# Patient Record
Sex: FEMALE | Race: WHITE | Employment: OTHER | URBAN - METROPOLITAN AREA
[De-identification: names, ages, dates, MRNs, and addresses within clinical notes are randomized per-mention and may not be internally consistent; named-entity substitution may affect disease eponyms.]

---

## 2017-06-21 ENCOUNTER — OFFICE VISIT (OUTPATIENT)
Dept: SURGERY | Age: 52
End: 2017-06-21

## 2017-06-21 VITALS
SYSTOLIC BLOOD PRESSURE: 118 MMHG | TEMPERATURE: 98 F | DIASTOLIC BLOOD PRESSURE: 90 MMHG | WEIGHT: 254 LBS | OXYGEN SATURATION: 95 % | HEIGHT: 65 IN | HEART RATE: 85 BPM | BODY MASS INDEX: 42.32 KG/M2 | RESPIRATION RATE: 18 BRPM

## 2017-06-21 DIAGNOSIS — E66.01 MORBID OBESITY WITH BMI OF 40.0-44.9, ADULT (HCC): Primary | ICD-10-CM

## 2017-06-21 DIAGNOSIS — I10 ESSENTIAL HYPERTENSION, MALIGNANT: ICD-10-CM

## 2017-06-21 DIAGNOSIS — K21.9 GASTROESOPHAGEAL REFLUX DISEASE, ESOPHAGITIS PRESENCE NOT SPECIFIED: ICD-10-CM

## 2017-06-21 RX ORDER — MICONAZOLE NITRATE 2 %
CREAM WITH APPLICATOR VAGINAL 2 TIMES DAILY
COMMUNITY
End: 2018-08-23

## 2017-06-21 NOTE — PROGRESS NOTES
1. Have you been to the ER, urgent care clinic since your last visit? Hospitalized since your last visit? Julia Lee for dog bite to face    2. Have you seen or consulted any other health care providers outside of the [facility] since your last visit? Include any pap smears or colon screening.  Julia Lee for dog bite to face

## 2017-06-21 NOTE — MR AVS SNAPSHOT
Visit Information Date & Time Provider Department Dept. Phone Encounter #  
 6/21/2017  1:00 PM Devon Talavera, Jose Miguel Galeas [phone] [id number] Upcoming Health Maintenance Date Due DTaP/Tdap/Td series (1 - Tdap) 3/7/1987 PAP AKA CERVICAL CYTOLOGY 3/7/1987 BREAST CANCER SCRN MAMMOGRAM 3/7/2016 FOBT Q 1 YEAR AGE 50-75 3/7/2016 INFLUENZA AGE 9 TO ADULT 8/1/2017 Allergies as of 6/21/2017  Review Complete On: 6/21/2017 By: Clista Matter Barley Severity Noted Reaction Type Reactions Levaquin [Levofloxacin] High 06/19/2015    Anaphylaxis Morphine  06/19/2015    Other (comments) Current Immunizations  Reviewed on 2/27/2016 No immunizations on file. Not reviewed this visit Vitals BP Pulse Temp Resp Height(growth percentile) Weight(growth percentile)  
 118/90 (BP 1 Location: Left arm, BP Patient Position: Sitting) 85 98 °F (36.7 °C) (Oral) 18 5' 5\" (1.651 m) 254 lb (115.2 kg) SpO2 BMI OB Status Smoking Status 95% 42.27 kg/m2 Hysterectomy Former Smoker Vitals History BMI and BSA Data Body Mass Index Body Surface Area  
 42.27 kg/m 2 2.3 m 2 Preferred Pharmacy Pharmacy Name Phone [facility] [address] [place], [place] [address]y [address]  [facility] [phone] Your Updated Medication List  
  
   
This list is accurate as of: 6/21/17  1:39 PM.  Always use your most recent med list.  
  
  
  
  
 acetaminophen 325 mg tablet Commonly known as:  TYLENOL Take 2 Tabs by mouth every six (6) hours. Indications: PAIN  
  
 aspirin delayed-release 325 mg tablet Take 1 Tab by mouth two (2) times a day. Start after Lovenox therapy is complete. calcium carbonate 200 mg calcium (500 mg) Chew Commonly known as:  TUMS Take 3 Tabs by mouth daily. CENTRUM SILVER PO Take  by mouth daily. cetirizine 10 mg tablet Commonly known as:  ZYRTEC Take 10 mg by mouth. Citracal + D tablet Generic drug:  calcium citrate-vitamin D3 Take  by mouth two (2) times a day. cloNIDine HCl 0.1 mg tablet Commonly known as:  CATAPRES  
0.1 mg three (3) times daily. cyanocobalamin 1,000 mcg tablet Take 1,000 mcg by mouth daily. Dexlansoprazole 60 mg Cpdb Take 30 mg by mouth daily. DULoxetine 60 mg capsule Commonly known as:  CYMBALTA 60 mg two (2) times a day. enoxaparin 40 mg/0.4 mL Commonly known as:  LOVENOX  
0.4 mL by SubCUTAneous route daily. Indications: DEEP VEIN THROMBOSIS PREVENTION  
  
 fentaNYL 75 mcg/hr Commonly known as:  DURAGESIC  
1 Patch by TransDERmal route every seventy-two (72) hours. gabapentin 300 mg capsule Commonly known as:  NEURONTIN  
300 mg three (3) times daily. HYDROmorphone 4 mg tablet Commonly known as:  DILAUDID Take 1 Tab by mouth every four (4) hours as needed. Max Daily Amount: 24 mg. Indications: PAIN, Not to be filled. This prescription is for instructional purposes only. Patient has supply at home. mometasone-formoterol 200-5 mcg/actuation HFA inhaler Commonly known as:  Swati Console Take 1 Puff by inhalation two (2) times a day. montelukast 10 mg tablet Commonly known as:  SINGULAIR  
10 mg nightly. NASONEX 50 mcg/actuation nasal spray Generic drug:  mometasone 2 Sprays by Both Nostrils route daily. * albuterol 2.5 mg /3 mL (0.083 %) nebulizer solution Commonly known as:  PROVENTIL VENTOLIN  
as needed. * PROAIR HFA 90 mcg/actuation inhaler Generic drug:  albuterol Take 2 Puffs by inhalation two (2) times daily as needed. senna-docusate 8.6-50 mg per tablet Commonly known as:  Selam Passy Take 1 Tab by mouth two (2) times a day. Indications: CONSTIPATION  
  
 tamoxifen 20 mg tablet Commonly known as:  NOLVADEX  
20 mg nightly. tiotropium 18 mcg inhalation capsule Commonly known as:  Tacey Conrado Take 1 Cap by inhalation daily. traZODone 50 mg tablet Commonly known as:  DESYREL  
nightly. VITAMIN D2 50,000 unit capsule Generic drug:  ergocalciferol 50,000 Units every Tuesday and Thursday. * Notice: This list has 2 medication(s) that are the same as other medications prescribed for you. Read the directions carefully, and ask your doctor or other care provider to review them with you. Introducing [facility]! Flaquita Bailey introduces [employer] patient portal. Now you can access parts of your medical record, email your doctor's office, and request medication refills online. 1. In your internet browser, go to [email or web address] 2. Click on the First Time User? Click Here link in the Sign In box. You will see the New Member Sign Up page. 3. Enter your [employer] Access Code exactly as it appears below. You will not need to use this code after youve completed the sign-up process. If you do not sign up before the expiration date, you must request a new code. · [employer] Access Code: [email or web address] Expires: 9/19/2017  1:39 PM 
 
4. Enter the last four digits of your Social Security Number (xxxx) and Date of Birth (mm/dd/yyyy) as indicated and click Submit. You will be taken to the next sign-up page. 5. Create a [employer] ID. This will be your [employer] login ID and cannot be changed, so think of one that is secure and easy to remember. 6. Create a [employer] password. You can change your password at any time. 7. Enter your Password Reset Question and Answer. This can be used at a later time if you forget your password. 8. Enter your e-mail address. You will receive e-mail notification when new information is available in [address] 9. Click Sign Up. You can now view and download portions of your medical record. 10. Click the Download Summary menu link to download a portable copy of your medical information. If you have questions, please visit the Frequently Asked Questions section of the [employer] website. Remember, [employer] is NOT to be used for urgent needs. For medical emergencies, dial 911. Now available from your iPhone and Android! Please provide this summary of care documentation to your next provider. Your primary care clinician is listed as Maday Ryan. If you have any questions after today's visit, please call [phone].

## 2017-06-22 NOTE — PROGRESS NOTES
Initial Consultation for possible revision of previous Bariatric Surgery     Milton Nelson is a 47 y.o. female who comes into the office today for initial consultation for the surgical options for the treatment of morbid obesity. She has chronic obesity unresponsive to numerous treatment strategies. Milton Nelson has tried a variety of weight-loss attempts including a previous Lap Amrik en y gastric bypass in 2003 by Dr. Nestor Baumgarten, but has had weight regain over recent years, notably related to ICU admission with severe asthma attacks requiring steroid treatment. Her pre-surgery weight was 315 lbs and her lowest postoperative body weight was aproximately 165 lbs. She claims to have maintained her weight between 165-180 lbs until 2015 when she experienced above asthma issues and since then has regained aproximately 75 lbs over 30 months. She has been told that her previous surgery is a lap gastric bypass. She does receive feedback from the previous surgery, including satiety with eating 3 inch sub sandwich, dumping symptoms which still keep her from eating more than a 'taste' of sugar/sweets. She does experience hunger but no specific food cravings- she believes her worse dietary indiscretion is drinking diet dr haq. She feels she becomes hungry again within several hours of a meal and often eats again which she agrees is grazing behavior. She was given a trial of phentermine but  Noticed no effect on her hunger. She has chronic pain syndrome mostly due to her back. She claims to need joint replacement for her right hip and both knees. She has known Vit D deficiency on supplementation, osteopenia and bone loss. She denies severe GERD and always has some hoarseness to her voice although she does not think this is GERD related. She reports being compliant with multivits, B12 and Fe for 'many years' and denies any of those being deficient. She is very limited in her physical activity due to pain. Antonella Mathur has significant health problems due to severe obesity - per below. Today she is Height: 5' 5\" (165.1 cm) , Weight: 254 lb (115.2 kg) for a BMI of Body mass index is 42.27 kg/(m^2). Lorenso Frankel Weight Loss Metrics 6/21/2017 2/26/2016 2/11/2016 6/19/2015   Today's Wt 254 lb 255 lb 255 lb 6 oz 241 lb 12.8 oz   BMI 42.27 kg/m2 42.43 kg/m2 42.5 kg/m2 40.24 kg/m2       Body mass index is 42.27 kg/(m^2). Past Medical History:   Diagnosis Date    Arthritis     Asthma     Autoimmune disease [facility])     ADULT MD    Cancer ([facility] [address] 2012    LEFT BREAST    Chronic pain     Fall     Headache     Ill-defined condition 2015    PULMONARY HYPERTENSION    Nausea & vomiting     Sarcoidosis ([facility] [address]     Sleep apnea     USES BIPAP     Adult muscular dystrophy    Past Surgical History:   Procedure Laterality Date    CHEST SURGERY PROCEDURE UNLISTED  2000    BIOPSY OUTER LUNG    HX BREAST RECONSTRUCTION  2012    HX BREAST RECONSTRUCTION  2012     3 SURGERIES TO REMOVE DEAD TISSUE    HX BREAST RECONSTRUCTION  2013 2015    LATISAMUS FLAP, IMPLANTS FAT GRAFTING     HX CERVICAL FUSION  2012    C5-C6 hardware    HX CHOLECYSTECTOMY  1998    HX COLPOSCOPY  2001    HX GASTRIC BYPASS  2003    HX GYN  2001    CERVIX CONE PROCEDURE    HX GYN  2008    EUTERO ABLATION    HX HEENT      HX HYSTERECTOMY  2014    HX HYSTEROSCOPY WITH ENDOMETRIAL ABLATION  2008    HX LUMBAR FUSION  2011    L5-S1 hardware    HX MASTECTOMY Bilateral 2012    HX ORTHOPAEDIC  2010    BONE GRAFT SCAFOID BONE WRIST    HX OTHER SURGICAL  2013    PAIN STIMULATOR    HX OTHER SURGICAL  2015    MUSCLE BIOPSY     HX TONSILLECTOMY  1982       Current Outpatient Prescriptions   Medication Sig Dispense Refill    calcium citrate-vitamin D3 (CITRACAL + D) tablet Take  by mouth two (2) times a day.  HYDROmorphone (DILAUDID) 4 mg tablet Take 1 Tab by mouth every four (4) hours as needed. Max Daily Amount: 24 mg. Indications: PAIN, Not to be filled. This prescription is for instructional purposes only. Patient has supply at home. 120 Tab 0    tiotropium (SPIRIVA) 18 mcg inhalation capsule Take 1 Cap by inhalation daily.  mometasone-formoterol (DULERA) 200-5 mcg/actuation HFA inhaler Take 1 Puff by inhalation two (2) times a day.  cetirizine (ZYRTEC) 10 mg tablet Take 10 mg by mouth.  Dexlansoprazole 60 mg CpDB Take 30 mg by mouth daily.  calcium carbonate (TUMS) 200 mg calcium (500 mg) chew Take 3 Tabs by mouth daily.  fentaNYL (DURAGESIC) 75 mcg/hr 1 Patch by TransDERmal route every seventy-two (72) hours.  gabapentin (NEURONTIN) 300 mg capsule 300 mg three (3) times daily. 1    DULoxetine (CYMBALTA) 60 mg capsule 60 mg two (2) times a day.  tamoxifen (NOLVADEX) 20 mg tablet 20 mg nightly. 11    VITAMIN D2 50,000 unit capsule 50,000 Units every Tuesday and Thursday. 0    traZODone (DESYREL) 50 mg tablet nightly. Purje 57 90 mcg/actuation inhaler Take 2 Puffs by inhalation two (2) times daily as needed. 3    NASONEX 50 mcg/actuation nasal spray 2 Sprays by Both Nostrils route daily. 3    montelukast (SINGULAIR) 10 mg tablet 10 mg nightly. 3    cloNIDine HCl (CATAPRES) 0.1 mg tablet 0.1 mg three (3) times daily. 2    cyanocobalamin 1,000 mcg tablet Take 1,000 mcg by mouth daily.  FOLIC ACID/MULTIVIT-MIN/LUTEIN (CENTRUM SILVER PO) Take  by mouth daily.  acetaminophen (TYLENOL) 325 mg tablet Take 2 Tabs by mouth every six (6) hours. Indications: PAIN 100 Tab 0    enoxaparin (LOVENOX) 40 mg/0.4 mL 0.4 mL by SubCUTAneous route daily. Indications: DEEP VEIN THROMBOSIS PREVENTION 7 Syringe 0    aspirin delayed-release 325 mg tablet Take 1 Tab by mouth two (2) times a day. Start after Lovenox therapy is complete. 60 Tab 0    senna-docusate (PERICOLACE) 8.6-50 mg per tablet Take 1 Tab by mouth two (2) times a day.  Indications: CONSTIPATION 60 Tab 0    albuterol (PROVENTIL VENTOLIN) 2.5 mg /3 mL (0.083 %) nebulizer solution as needed. 3       Allergies   Allergen Reactions    Levaquin [Levofloxacin] Anaphylaxis    Morphine Other (comments)       Social History   Substance Use Topics    Smoking status: Former Smoker     Packs/day: 0.50     Years: 3.00     Quit date: 2/11/1988    Smokeless tobacco: Never Used    Alcohol use No       Family History   Problem Relation Age of Onset    Heart Disease Mother     Asthma Mother     Diabetes Mother     COPD Mother     Cancer Sister      MELANOMA    Heart Disease Brother     Asthma Brother     Cancer Sister      BREAST    Heart Attack Brother     Cancer Paternal Grandmother     Anesth Problems Neg Hx        ROS, positive where in bold:    General: fevers, chills, night sweats, fatigue, weight loss, weight gain. GI: abdominal pain, nausea, vomiting, no constipation or loose stools, no change in bowel habits, hematochezia, melena, or GERD. Integumentary: dermatitis or abnormal moles. HEENT:  visual changes, vertigo, epistaxis, dysphagia, hoarseness    Cardiac: chest pain, palpitations, hypertension, edema,  varicosities    Resp:  cough, shortness of breath, wheezing, hemoptysis, snoring,  reactive airway disease    : hematuria, dysuria, frequency, urgency, nocturia, stress urinary incontinence    MSK: weakness, joint pain,  Arthritis, severe back pain radiating to legs    Endocrine: diabetes, thyroid disease, polyuria, polydipsia, polyphagia, poor wound healing, heat intolerance,cold intolerance. Lymph/Heme: anemia, bruising,  history of blood transfusions. Neuro: dizziness, headache, fainting, seizures, stroke.     Psychiatry:  Anxiety, depression, psychosis      Physical Exam:  Visit Vitals    /90 (BP 1 Location: Left arm, BP Patient Position: Sitting)    Pulse 85    Temp 98 °F (36.7 °C) (Oral)    Resp 18    Ht 5' 5\" (1.651 m)    Wt 254 lb (115.2 kg)    SpO2 95%    BMI 42.27 kg/m2       General: Well developed, obese 52 y.o. female in no acute distress  ENMT: normocephalic, atraumatic   Respiratory: excursions normal and symmetrical  Cardiovascular: Regular rate and rhythm  Abdomen:  No obvious hernia  Musculoskeletal: normal gait/station  Neuro: symmetrical  Psych: alert and oriented to person, place and time      Impression:    Chayo Barrera is a 47 y.o. female who is suffering from morbid obesity with a BMI of Body mass index is 42.27 kg/(m^2). and comorbidities  who could benefit from weight loss significantly. She is struggling after previous lap gastric bypass. I believe she may be best managed by anatomic and behavioral evaluations. Work up will include an UGI series with barium tablet and meal in order to assess the previous gastric bypass anatomy. I will delay on performing EGD because I suspect based on her eating behavior that she may not have profound failure of restriction. I have also asked her to see Hebert Sorto RD for behavioral assessment / guidance. I believe this will be a difficult management problem due to the apparent role of corticosteroids in her weight regain suggesting this may have been from their impact on her metabolism rather than an anatomic issue. She still appears to have a fair amount of restriction with eating. As far as approaches, malabsorption may not be safe for her due to her already existing Vit D deficiency problems, however clearly she needs substantial weight loss to help her with both chronic pain and need for joint replacements. Dr Arianna Kennedy of [facility] is her orthopedic surgeon. Dr Brisa Friend is her PCP. She has also seen Dr Michelle Ureña at [address] [place] [address] without success. Should malabsorptive intervention become the only option and deemed reasonable, I would insist on a distal gastric feeding tube which would likely be permanent in order to be able to administer fat soluble vitamins and to rescue her nutritional status if needed.      We will discuss the results of the above evaluation with the patient when testing is completed. More than 50% of this encounter was spent in direct counseling for this patient regarding the ongoing evaluation and treatment as well as options for future care. All questions have been answered in detail and the patient has expressed satisfaction regarding understanding of all this information. At least 60 minutes were spent in counseling during this encounter.

## 2017-07-12 DIAGNOSIS — E66.01 MORBID OBESITY, UNSPECIFIED OBESITY TYPE (HCC): Primary | ICD-10-CM

## 2017-07-21 ENCOUNTER — CLINICAL SUPPORT (OUTPATIENT)
Dept: SURGERY | Age: 52
End: 2017-07-21

## 2017-07-21 DIAGNOSIS — E66.01 MORBID OBESITY WITH BMI OF 40.0-44.9, ADULT (HCC): Primary | ICD-10-CM

## 2017-07-24 ENCOUNTER — HOSPITAL ENCOUNTER (OUTPATIENT)
Dept: CT IMAGING | Age: 52
Discharge: HOME | End: 2017-07-24
Attending: INTERNAL MEDICINE
Payer: COMMERCIAL

## 2017-07-24 VITALS — WEIGHT: 263 LBS | BODY MASS INDEX: 43.77 KG/M2

## 2017-07-24 DIAGNOSIS — M54.16 LUMBAR RADICULOPATHY: ICD-10-CM

## 2017-07-24 PROCEDURE — 72131 CT LUMBAR SPINE W/O DYE: CPT

## 2017-07-24 NOTE — PROGRESS NOTES
Pre-operative Bariatric Nutrition Evaluation    Date: 2017   Peggy Hua M.D. Name: Jess Butler  :  1965  Age:  47  Gender: Female   Type of Surgery: [x]           Gastric Bypass  []           LAGB  []           Sleeve Gastrectomy    ASSESSMENT:    Past Medical History:see [facility] for detailed medical history; h/o gastric bypass in      Medications/Supplements:   Prior to Admission medications    Medication Sig Start Date End Date Taking? Authorizing Provider   calcium citrate-vitamin D3 (CITRACAL + D) tablet Take  by mouth two (2) times a day. Historical Provider   acetaminophen (TYLENOL) 325 mg tablet Take 2 Tabs by mouth every six (6) hours. Indications: PAIN 3/15/16   Veronica Martins MD   HYDROmorphone (DILAUDID) 4 mg tablet Take 1 Tab by mouth every four (4) hours as needed. Max Daily Amount: 24 mg. Indications: PAIN, Not to be filled. This prescription is for instructional purposes only. Patient has supply at home. 3/15/16   Veronica Martins MD   enoxaparin (LOVENOX) 40 mg/0.4 mL 0.4 mL by SubCUTAneous route daily. Indications: DEEP VEIN THROMBOSIS PREVENTION 3/15/16   Veronica Martins MD   aspirin delayed-release 325 mg tablet Take 1 Tab by mouth two (2) times a day. Start after Lovenox therapy is complete. 3/15/16   Veronica Martins MD   senna-docusate (PERICOLACE) 8.6-50 mg per tablet Take 1 Tab by mouth two (2) times a day. Indications: CONSTIPATION 16   Veronica Martins MD   tiotropium [facility]) 18 mcg inhalation capsule Take 1 Cap by inhalation daily. Historical Provider   mometasone-formoterol (DULERA) 200-5 mcg/actuation HFA inhaler Take 1 Puff by inhalation two (2) times a day. Historical Provider   cetirizine (ZYRTEC) 10 mg tablet Take 10 mg by mouth. Historical Provider   Dexlansoprazole 60 mg CpDB Take 30 mg by mouth daily. Historical Provider   calcium carbonate (TUMS) 200 mg calcium (500 mg) chew Take 3 Tabs by mouth daily.     Historical Provider   fentaNYL (DURAGESIC) 75 mcg/hr 1 Patch by TransDERmal route every seventy-two (72) hours. Historical Provider   gabapentin (NEURONTIN) 300 mg capsule 300 mg three (3) times daily. 5/26/15   Historical Provider   DULoxetine (CYMBALTA) 60 mg capsule 60 mg two (2) times a day. 6/16/15   Historical Provider   tamoxifen (NOLVADEX) 20 mg tablet 20 mg nightly. 3/28/15   Historical Provider   VITAMIN D2 50,000 unit capsule 50,000 Units every Tuesday and Thursday. 5/7/15   Historical Provider   traZODone (DESYREL) 50 mg tablet nightly. 4/11/15   Historical Provider   PROAIR HFA 90 mcg/actuation inhaler Take 2 Puffs by inhalation two (2) times daily as needed. 5/29/15   Historical Provider   NASONEX 50 mcg/actuation nasal spray 2 Sprays by Both Nostrils route daily. 5/28/15   Historical Provider   montelukast (SINGULAIR) 10 mg tablet 10 mg nightly. 5/28/15   Historical Provider   albuterol (PROVENTIL VENTOLIN) 2.5 mg /3 mL (0.083 %) nebulizer solution as needed. 5/15/15   Historical Provider   cloNIDine HCl (CATAPRES) 0.1 mg tablet 0.1 mg three (3) times daily. 6/9/15   Historical Provider   cyanocobalamin 1,000 mcg tablet Take 1,000 mcg by mouth daily. Historical Provider   FOLIC ACID/MULTIVIT-MIN/LUTEIN (CENTRUM SILVER PO) Take  by mouth daily. Historical Provider       Food Allergies/Intolerances:lactose intolerance     Anthropometrics:    Ht:65\"    Wt: 263#    IBW: 125#    %IBW: 210%     BMI:43    Category: obesity III     Reported wt history:Pt presents today for nutrition evaluation seeking possible gastric bypass revision. Pt with primary gastric bypass in 2007 and lost from 310# down to 164#. Attributes majority of wt regain d/t medical conditions and medications/steroids and minimal physical activity. Pt is very physically limited and reports needing a hip replacement and both knees replaced. Ongoing limitations with physical activity have made wt loss difficult.  Has tried heated pools and physical therapy and reports she is unable to maintain d/t pain. Also reports asthma and other health conditions that make it difficult. Pt does report that she still feels restricted with regard to portion sizes. Eating habits/behaviors are sporadic and lacks routine/consistent meal pattern. Overall it appears that physical activity is not an option to help with wt loss so changes with eating habits will be necessary. Pt having upper GI next week and then will meet with Dr. Linette Lizarraga again to discuss next steps. Today we discussed changes she can be making with eating habits to help promote wt loss as well as demonstrate ability to better comply with general nutrition guidelines for gastric bypass. Exercise/Physical Activity:none     Reported Diet History:h/o gastric bypass 2007; has also tried diet pills, fasting and physician prescribed diets     24 Hour Diet Recall  Breakfast  English muffin and Diet Dr. Corrina avelar and Diet Dr. Pool Robison grilled nuggets and fries or fruit cup and Diet Dr. Chase Gould, banana, popcorn    Charol Hush Dr Nazanin Evans and water      A pre-op nutrition checklist was reviewed. Patient checked off 4 of 15 items. Environment/Psychosocial/Support:pt's  present during appointment     NUTRITION DIAGNOSIS:  1. Self-monitoring deficit r/t previous lack of value for this change evidenced by pt skips meals and lacks routine/consistent eating patterns. 2. Food and nutrition related knowledge deficit r/t lack of exposure to current nutrition guidelines evidenced by pt not compliant with current nutrition guidelines for gastric bypass. NUTRITION INTERVENTION:  Pt educated on nutrition recommendations for weight loss surgery, specifically gastric bypass. Instructed on consuming 3 meals per day starting now.   Use the balanced plate method to plan meals, include 3 oz of lean source of protein, 1/2 cup whole grains, unlimited non-starchy vegetables, 1/2 cup fruit and 1 serving of low fat dairy. Utilize handouts listing healthy snack and meal ideas to limit restaurant meals. After surgery measure all meals to 1/2 cup. Each meal will contain a 1/4 cup lean protein and 1/4 cup fruit, non-starchy vegetable or starch (limiting to once per day). Aim for 60 g protein per day. Sip on 48-64 oz of sugar free, calorie free, non-carbonated beverages each day. Do not use a straw. Do not consume beverages 30 minutes before, during or 30 minutes after meals. Read all nutrition labels. Demonstrated and emphasized identifying serving size, total fat, sugar and protein content. Defined low fat as </= 3 g per serving. Discussed lean and extra lean sources of protein. Provided list of low fat cooking methods. Avoid foods with sugar listed in the first 3 ingredients and >/15 g sugar per serving. Excess sugar/fat intake may lead to dumping syndrome. Discussed signs and symptoms of dumping syndrome. Practice mindful eating habits; take small bites, chew thoroughly, avoid distractions, utilize hunger/fullness scale. Consume meals over 20-30 minutes. Attend Bariatric Support Group and increase physical activity (approved per MD) for long term weight maintenance. NUTRITION MONITORING AND EVALUATION:    The following goals were established with patient;  1. Work on eating 3 meals a day more consistently. Less snacking and more routine/consistent/intentional meals. 2. Include lean protein and produce at each meal.   3. Limit snacking/grazing. 4.Work towards elimination of diet soda and increase water intake. 5. Follow up with RD next month after seeing Dr. Femi Jarquin. Specific tips and techniques to facilitate compliance with above recommendations were provided and discussed. Pt was strongly encourage to begin making necessary changes now, attend support group, and re-visit the dietitian prn.  Nutrition evaluation reveals important lifestyle and behavioral changes are indicated. Will continue to assess at follow up visit. If further details are desired please feel free to contact me at [phone]. This phone number was also provided to the patient for any further questions or concerns.            Mary Kelly RD

## 2017-07-26 ENCOUNTER — HOSPITAL ENCOUNTER (OUTPATIENT)
Dept: GENERAL RADIOLOGY | Age: 52
Discharge: HOME | End: 2017-07-26
Attending: SURGERY
Payer: COMMERCIAL

## 2017-07-26 DIAGNOSIS — E66.01 MORBID OBESITY, UNSPECIFIED OBESITY TYPE (HCC): ICD-10-CM

## 2017-07-26 PROCEDURE — 74241 XR UPPER GI SERIES W KUB: CPT

## 2017-08-02 ENCOUNTER — OFFICE VISIT (OUTPATIENT)
Dept: SURGERY | Age: 52
End: 2017-08-02

## 2017-08-02 VITALS
TEMPERATURE: 98.6 F | DIASTOLIC BLOOD PRESSURE: 70 MMHG | RESPIRATION RATE: 18 BRPM | WEIGHT: 251.5 LBS | SYSTOLIC BLOOD PRESSURE: 100 MMHG | HEIGHT: 65 IN | OXYGEN SATURATION: 97 % | BODY MASS INDEX: 41.9 KG/M2 | HEART RATE: 70 BPM

## 2017-08-02 DIAGNOSIS — R63.5 ABNORMAL WEIGHT GAIN: Primary | ICD-10-CM

## 2017-08-02 NOTE — PROGRESS NOTES
Returns to discuss results of UGI series- per below      Clinical history: History of gastric bypass surgery and multiple prior abdominal  surgeries, evaluate prior to revision      radiograph demonstrates postoperative changes in the upper abdomen. The  patient ingested barium in the frontal and lateral positions followed by a  barium tablet and barium/cottage cheese mixture. The gastric pouch is distended  but empties readily into the anastomotic small bowel without evidence of leak or  obstruction. The gastric pouch is approximately 600 mL. Barium tablet passed  readily through the anastomosis into the proximal small bowel. The patient  ingested the barium/cottage cheese mixture without difficulty, however under  fluoroscopy, the food bolus passed from the esophagus immediately through the  gastric pouch and into the proximal small bowel. Moderate reflux was noted  throughout the examination.     IMPRESSION: Distention of the gastric pouch. Moderate reflux. The anastomosis is  widely patent. The food bolus passes quickly from the esophagus immediately  through the gastric pouch and into the proximal small bowel. Appears to be a dumbbell configuration pouch with large spherical upper component suggesting  Intact gastric fundus with distension and rapid transit of food out of pouch into intestine.      This appears to be a very large gastric pouch although I think the 600 ml estimate is a mistake  I have asked her to undergo EGD- will schedule with Dr Maria M Mtz to make pouch/outlet measurements to further our info  She has already had one appt with RD and will need to continue this behavioral effort  If the EGD confirms this very enlarged pouch volume then we would probably not recommend malabsorptive Rx as opposed to   Pouch/outlet reduction, as malabsorption would increase her issues with vitamin / calcium deficiency and bone metabolism issues    Will request EGD - Dr Maria M Mtz  Request that she RTC in 3 months time for ongoing evaluation of options for Rx    More than 50% of this encounter was spent in direct counseling for this patient Counseling included topics such as the ongoing evaluation and treatment as well as options for future care. All questions have been answered in detail and the patient has expressed satisfaction regarding understanding of all this information. At least 40 minutes were spent in direct patient contact including more than 50% of the time spent directly counseling the patient as above during this encounter.

## 2017-08-02 NOTE — PROGRESS NOTES
1. Have you been to the ER, urgent care clinic since your last visit? Hospitalized since your last visit? No    2. Have you seen or consulted any other health care providers outside of the [address] [place] [address] since your last visit? Include any pap smears or colon screening.  No

## 2017-08-02 NOTE — MR AVS SNAPSHOT
Visit Information Date & Time Provider Department Dept. Phone Encounter #  
 8/2/2017  2:15 PM Trisha Romero, Jose Miguel Galeas [phone] [id number] Upcoming Health Maintenance Date Due DTaP/Tdap/Td series (1 - Tdap) 3/7/1987 PAP AKA CERVICAL CYTOLOGY 3/7/1987 BREAST CANCER SCRN MAMMOGRAM 3/7/2016 FOBT Q 1 YEAR AGE 50-75 3/7/2016 INFLUENZA AGE 9 TO ADULT 8/1/2017 Allergies as of 8/2/2017  Review Complete On: 8/2/2017 By: Ana Aldridge Severity Noted Reaction Type Reactions Levaquin [Levofloxacin] High 06/19/2015    Anaphylaxis Morphine  06/19/2015    Other (comments) Current Immunizations  Reviewed on 2/27/2016 No immunizations on file. Not reviewed this visit Vitals BP Pulse Temp Resp Height(growth percentile) Weight(growth percentile) 100/70 (BP 1 Location: Left arm, BP Patient Position: Sitting) 70 98.6 °F (37 °C) (Oral) 18 5' 5\" (1.651 m) 251 lb 8 oz (114.1 kg) SpO2 BMI OB Status Smoking Status 97% 41.85 kg/m2 Hysterectomy Former Smoker BMI and BSA Data Body Mass Index Body Surface Area  
 41.85 kg/m 2 2.29 m 2 Preferred Pharmacy Pharmacy Name Phone [facility] [address] [place], [place] [address]y [address] AT [address] [place] [address] [phone] Your Updated Medication List  
  
   
This list is accurate as of: 8/2/17  3:14 PM.  Always use your most recent med list.  
  
  
  
  
 acetaminophen 325 mg tablet Commonly known as:  TYLENOL Take 2 Tabs by mouth every six (6) hours. Indications: PAIN  
  
 aspirin delayed-release 325 mg tablet Take 1 Tab by mouth two (2) times a day. Start after Lovenox therapy is complete. calcium carbonate 200 mg calcium (500 mg) Chew Commonly known as:  TUMS Take 3 Tabs by mouth daily. CENTRUM SILVER PO Take  by mouth daily. cetirizine 10 mg tablet Commonly known as:  ZYRTEC  
 Take 10 mg by mouth. Citracal + D tablet Generic drug:  calcium citrate-vitamin D3 Take  by mouth two (2) times a day. cloNIDine HCl 0.1 mg tablet Commonly known as:  CATAPRES  
0.1 mg three (3) times daily. cyanocobalamin 1,000 mcg tablet Take 1,000 mcg by mouth daily. Dexlansoprazole 60 mg Cpdb Take 30 mg by mouth daily. DULoxetine 60 mg capsule Commonly known as:  CYMBALTA 60 mg two (2) times a day. enoxaparin 40 mg/0.4 mL Commonly known as:  LOVENOX  
0.4 mL by SubCUTAneous route daily. Indications: DEEP VEIN THROMBOSIS PREVENTION  
  
 fentaNYL 75 mcg/hr Commonly known as:  DURAGESIC  
1 Patch by TransDERmal route every seventy-two (72) hours. gabapentin 300 mg capsule Commonly known as:  NEURONTIN  
300 mg three (3) times daily. HYDROmorphone 4 mg tablet Commonly known as:  DILAUDID Take 1 Tab by mouth every four (4) hours as needed. Max Daily Amount: 24 mg. Indications: PAIN, Not to be filled. This prescription is for instructional purposes only. Patient has supply at home. mometasone-formoterol 200-5 mcg/actuation HFA inhaler Commonly known as:  Paulette Lion Take 1 Puff by inhalation two (2) times a day. montelukast 10 mg tablet Commonly known as:  SINGULAIR  
10 mg nightly. NASONEX 50 mcg/actuation nasal spray Generic drug:  mometasone 2 Sprays by Both Nostrils route daily. * albuterol 2.5 mg /3 mL (0.083 %) nebulizer solution Commonly known as:  PROVENTIL VENTOLIN  
as needed. * PROAIR HFA 90 mcg/actuation inhaler Generic drug:  albuterol Take 2 Puffs by inhalation two (2) times daily as needed. senna-docusate 8.6-50 mg per tablet Commonly known as:  Marny Walhonding Take 1 Tab by mouth two (2) times a day. Indications: CONSTIPATION  
  
 tamoxifen 20 mg tablet Commonly known as:  NOLVADEX  
20 mg nightly. tiotropium 18 mcg inhalation capsule Commonly known as:  Adriane Chrisney Take 1 Cap by inhalation daily. traZODone 50 mg tablet Commonly known as:  DESYREL  
nightly. VITAMIN D2 50,000 unit capsule Generic drug:  ergocalciferol 50,000 Units every Tuesday and Thursday. * Notice: This list has 2 medication(s) that are the same as other medications prescribed for you. Read the directions carefully, and ask your doctor or other care provider to review them with you. Introducing [facility]! Hector Ye introduces [employer] patient portal. Now you can access parts of your medical record, email your doctor's office, and request medication refills online. 1. In your internet browser, go to [email or web address] 2. Click on the First Time User? Click Here link in the Sign In box. You will see the New Member Sign Up page. 3. Enter your [employer] Access Code exactly as it appears below. You will not need to use this code after youve completed the sign-up process. If you do not sign up before the expiration date, you must request a new code. · [employer] Access Code: [email or web address] Expires: 9/19/2017  1:39 PM 
 
4. Enter the last four digits of your Social Security Number (xxxx) and Date of Birth (mm/dd/yyyy) as indicated and click Submit. You will be taken to the next sign-up page. 5. Create a [employer] ID. This will be your [employer] login ID and cannot be changed, so think of one that is secure and easy to remember. 6. Create a [employer] password. You can change your password at any time. 7. Enter your Password Reset Question and Answer. This can be used at a later time if you forget your password. 8. Enter your e-mail address. You will receive e-mail notification when new information is available in [address] 9. Click Sign Up. You can now view and download portions of your medical record. 10. Click the Download Summary menu link to download a portable copy of your medical information. If you have questions, please visit the Frequently Asked Questions section of the [employer] website. Remember, [employer] is NOT to be used for urgent needs. For medical emergencies, dial 911. Now available from your iPhone and Android! Please provide this summary of care documentation to your next provider. Your primary care clinician is listed as Harjeet Limon. If you have any questions after today's visit, please call [phone].

## 2017-08-09 ENCOUNTER — TELEPHONE (OUTPATIENT)
Dept: SURGERY | Age: 52
End: 2017-08-09

## 2017-08-09 NOTE — TELEPHONE ENCOUNTER
Called patient and advised she would be having EGD done and not UGI.  GI doctor information was given for her to call and check the status of her appointment

## 2017-08-21 ENCOUNTER — ANESTHESIA (OUTPATIENT)
Dept: ENDOSCOPY | Age: 52
End: 2017-08-21
Payer: COMMERCIAL

## 2017-08-21 ENCOUNTER — HOSPITAL ENCOUNTER (OUTPATIENT)
Age: 52
Setting detail: OUTPATIENT SURGERY
Discharge: HOME | End: 2017-08-21
Attending: INTERNAL MEDICINE | Admitting: INTERNAL MEDICINE
Payer: COMMERCIAL

## 2017-08-21 ENCOUNTER — ANESTHESIA EVENT (OUTPATIENT)
Dept: ENDOSCOPY | Age: 52
End: 2017-08-21
Payer: COMMERCIAL

## 2017-08-21 VITALS
SYSTOLIC BLOOD PRESSURE: 137 MMHG | RESPIRATION RATE: 17 BRPM | OXYGEN SATURATION: 97 % | HEART RATE: 76 BPM | DIASTOLIC BLOOD PRESSURE: 69 MMHG | TEMPERATURE: 97.8 F

## 2017-08-21 PROCEDURE — 74011250636 HC RX REV CODE- 250/636

## 2017-08-21 PROCEDURE — 76040000019: Performed by: INTERNAL MEDICINE

## 2017-08-21 PROCEDURE — 76060000031 HC ANESTHESIA FIRST 0.5 HR: Performed by: INTERNAL MEDICINE

## 2017-08-21 PROCEDURE — 74011000250 HC RX REV CODE- 250

## 2017-08-21 RX ORDER — LIDOCAINE HYDROCHLORIDE 20 MG/ML
INJECTION, SOLUTION EPIDURAL; INFILTRATION; INTRACAUDAL; PERINEURAL AS NEEDED
Status: DISCONTINUED | OUTPATIENT
Start: 2017-08-21 | End: 2017-08-21 | Stop reason: HOSPADM

## 2017-08-21 RX ORDER — SODIUM CHLORIDE 0.9 % (FLUSH) 0.9 %
5-10 SYRINGE (ML) INJECTION EVERY 8 HOURS
Status: DISCONTINUED | OUTPATIENT
Start: 2017-08-21 | End: 2017-08-21 | Stop reason: HOSPADM

## 2017-08-21 RX ORDER — SODIUM CHLORIDE 0.9 % (FLUSH) 0.9 %
5-10 SYRINGE (ML) INJECTION AS NEEDED
Status: DISCONTINUED | OUTPATIENT
Start: 2017-08-21 | End: 2017-08-21 | Stop reason: HOSPADM

## 2017-08-21 RX ORDER — DIPHENHYDRAMINE HYDROCHLORIDE 50 MG/ML
50 INJECTION, SOLUTION INTRAMUSCULAR; INTRAVENOUS ONCE
Status: DISCONTINUED | OUTPATIENT
Start: 2017-08-21 | End: 2017-08-21 | Stop reason: HOSPADM

## 2017-08-21 RX ORDER — DEXTROMETHORPHAN/PSEUDOEPHED 2.5-7.5/.8
1.2 DROPS ORAL
Status: DISCONTINUED | OUTPATIENT
Start: 2017-08-21 | End: 2017-08-21 | Stop reason: HOSPADM

## 2017-08-21 RX ORDER — FLUMAZENIL 0.1 MG/ML
0.2 INJECTION INTRAVENOUS
Status: DISCONTINUED | OUTPATIENT
Start: 2017-08-21 | End: 2017-08-21 | Stop reason: HOSPADM

## 2017-08-21 RX ORDER — SODIUM CHLORIDE 9 MG/ML
INJECTION, SOLUTION INTRAVENOUS
Status: DISCONTINUED | OUTPATIENT
Start: 2017-08-21 | End: 2017-08-21 | Stop reason: HOSPADM

## 2017-08-21 RX ORDER — NALOXONE HYDROCHLORIDE 0.4 MG/ML
0.4 INJECTION, SOLUTION INTRAMUSCULAR; INTRAVENOUS; SUBCUTANEOUS
Status: DISCONTINUED | OUTPATIENT
Start: 2017-08-21 | End: 2017-08-21 | Stop reason: HOSPADM

## 2017-08-21 RX ORDER — ATROPINE SULFATE 0.1 MG/ML
0.5 INJECTION INTRAVENOUS
Status: DISCONTINUED | OUTPATIENT
Start: 2017-08-21 | End: 2017-08-21 | Stop reason: HOSPADM

## 2017-08-21 RX ORDER — SODIUM CHLORIDE 9 MG/ML
100 INJECTION, SOLUTION INTRAVENOUS CONTINUOUS
Status: DISCONTINUED | OUTPATIENT
Start: 2017-08-21 | End: 2017-08-21 | Stop reason: HOSPADM

## 2017-08-21 RX ORDER — FENTANYL CITRATE 50 UG/ML
100 INJECTION, SOLUTION INTRAMUSCULAR; INTRAVENOUS
Status: DISCONTINUED | OUTPATIENT
Start: 2017-08-21 | End: 2017-08-21 | Stop reason: HOSPADM

## 2017-08-21 RX ORDER — MIDAZOLAM HYDROCHLORIDE 1 MG/ML
.25-1 INJECTION, SOLUTION INTRAMUSCULAR; INTRAVENOUS
Status: DISCONTINUED | OUTPATIENT
Start: 2017-08-21 | End: 2017-08-21 | Stop reason: HOSPADM

## 2017-08-21 RX ORDER — EPINEPHRINE 0.1 MG/ML
1 INJECTION INTRACARDIAC; INTRAVENOUS
Status: DISCONTINUED | OUTPATIENT
Start: 2017-08-21 | End: 2017-08-21 | Stop reason: HOSPADM

## 2017-08-21 RX ORDER — PROPOFOL 10 MG/ML
INJECTION, EMULSION INTRAVENOUS AS NEEDED
Status: DISCONTINUED | OUTPATIENT
Start: 2017-08-21 | End: 2017-08-21 | Stop reason: HOSPADM

## 2017-08-21 RX ADMIN — PROPOFOL 25 MG: 10 INJECTION, EMULSION INTRAVENOUS at 07:45

## 2017-08-21 RX ADMIN — LIDOCAINE HYDROCHLORIDE 100 MG: 20 INJECTION, SOLUTION EPIDURAL; INFILTRATION; INTRACAUDAL; PERINEURAL at 07:42

## 2017-08-21 RX ADMIN — PROPOFOL 75 MG: 10 INJECTION, EMULSION INTRAVENOUS at 07:42

## 2017-08-21 RX ADMIN — PROPOFOL 25 MG: 10 INJECTION, EMULSION INTRAVENOUS at 07:47

## 2017-08-21 RX ADMIN — SODIUM CHLORIDE: 9 INJECTION, SOLUTION INTRAVENOUS at 07:38

## 2017-08-21 RX ADMIN — PROPOFOL 25 MG: 10 INJECTION, EMULSION INTRAVENOUS at 07:43

## 2017-08-21 NOTE — ANESTHESIA PREPROCEDURE EVALUATION
Anesthetic History     PONV          Review of Systems / Medical History  Patient summary reviewed, nursing notes reviewed and pertinent labs reviewed    Pulmonary        Sleep apnea: BiPAP    Asthma     Comments: sarcoid   Neuro/Psych              Cardiovascular    Hypertension              Exercise tolerance: >4 METS  Comments: Sinus rhythm with premature supraventricular complexes   Otherwise normal ECG   No previous ECGs available   Confirmed by Deanne Cyr MD, Jennifer Le ([id number]) on 2/12/2016 10:13:46 AM     pulm htn   GI/Hepatic/Renal               Comments: Weight gain  S/p gastric bypass Endo/Other        Arthritis     Other Findings   Comments: C5-C6 hardware  Pain pump T8-9  Fusion L5-S1         Physical Exam    Airway  Mallampati: III  TM Distance: 4 - 6 cm  Neck ROM: normal range of motion   Mouth opening: Normal     Cardiovascular    Rhythm: regular  Rate: normal         Dental  No notable dental hx       Pulmonary  Breath sounds clear to auscultation               Abdominal  Abdominal exam normal       Other Findings            Anesthetic Plan    ASA: 3  Anesthesia type: MAC          Induction: Intravenous  Anesthetic plan and risks discussed with: Patient

## 2017-08-21 NOTE — PROCEDURES
[facility], [address] (EGD) Procedure Note    Kendy Ndiaye  1965  [id number]      Procedure: Endoscopic Gastroduodenoscopy --diagnostic    Indication: weight gain after gastric bypass surgery    Pre-operative Diagnosis: see indication above    Post-operative Diagnosis: see findings below    : Jaclyn Hayden. Hector Willett MD    Referring Provider: David Barrow MD,  Robert Delong MD      Anesthesia/Sedation:  MAC anesthesia        Procedure Details     After informed consent was obtained for the procedure, with all risks and benefits of procedure explained the patient was taken to the endoscopy suite and placed in the left lateral decubitus position. Following sequential administration of sedation as per above, the endoscope was inserted into the mouth and advanced under direct vision to distal Amrik limb. A careful inspection was made as the gastroscope was withdrawn, including a retroflexed view of the proximal stomach; findings and interventions are described below. Findings:   A normal Z line was seen at 40 cm. The gastric pouch was 4 cm in length was spherical in shape. There was some retained food in the gastric pouch. Retroflexion was performed within the pouch and was normal appearing. A widely patent GJ anastomosis was located at 44 cm. The retroflexed estimated diameter of the GJ anastomosis was 18-20 mm. No marginal ulceration was seen. The blind end of the jejunal side of the anastomosis was located at 50 cm. The scope was advanced to the distal Amrik limb. No bile reflux was seen. The JJ anastomosis was not reached. Therapies: none    Specimens: none         EBL: None      Complications:   None; patient tolerated the procedure well. Impression:    Status post RYGB with findings described as above    Recommendations: Follow up with Dr. Deric Fox By: Jaclyn Hayden.  Hector Willett MD     8/21/2017 7:54 AM

## 2017-08-21 NOTE — H&P
[address] [facility] [address] [place] [address], [facility]      History and Physical       NAME:  Emma Jones   :   1965   MRN:   [id number]             History of Present Illness:  Patient is a 47 y.o. who is seen for weight regain after gastric bypass and she is being considered for revision. PMH:  Past Medical History:   Diagnosis Date    Arthritis     Asthma     Autoimmune disease ([facility]     ADULT MD    Cancer ([facility] 2012    LEFT BREAST    Chronic pain     Fall     Headache     Ill-defined condition 2015    PULMONARY HYPERTENSION    Nausea & vomiting     Sarcoidosis ([facility]     Sleep apnea     USES BIPAP       PSH:  Past Surgical History:   Procedure Laterality Date    CHEST SURGERY PROCEDURE UNLISTED      BIOPSY OUTER LUNG    HX BREAST RECONSTRUCTION      HX BREAST RECONSTRUCTION       3 SURGERIES TO REMOVE DEAD TISSUE    HX BREAST RECONSTRUCTION   2015    LATISAMUS FLAP, IMPLANTS FAT GRAFTING     HX CERVICAL FUSION  2012    C5-C6 hardware    HX CHOLECYSTECTOMY  1998    HX COLPOSCOPY  2001    HX GASTRIC BYPASS  2003    HX GYN  2001    CERVIX CONE PROCEDURE    HX GYN  2008    EUTERO ABLATION    HX HEENT      HX HYSTERECTOMY  2014    HX HYSTEROSCOPY WITH ENDOMETRIAL ABLATION  2008    HX LUMBAR FUSION  2011    L5-S1 hardware    HX MASTECTOMY Bilateral 2012    HX ORTHOPAEDIC  2010    BONE GRAFT SCAFOID BONE WRIST    HX OTHER SURGICAL  2013    PAIN STIMULATOR    HX OTHER SURGICAL  2015    MUSCLE BIOPSY     HX TONSILLECTOMY  1982       Allergies: Allergies   Allergen Reactions    Levaquin [Levofloxacin] Anaphylaxis    Morphine Other (comments)       Home Medications:  Prior to Admission Medications   Prescriptions Last Dose Informant Patient Reported? Taking? DULoxetine (CYMBALTA) 60 mg capsule 2017 at Unknown time  Yes Yes   Si mg two (2) times a day.    Dexlansoprazole 60 mg CpDB Not Taking at Unknown time  Yes No   Sig: Take 30 mg by mouth daily. FOLIC ACID/MULTIVIT-MIN/LUTEIN (CENTRUM SILVER PO) 2017 at Unknown time  Yes Yes   Sig: Take  by mouth daily. HYDROmorphone (DILAUDID) 4 mg tablet 2017 at Unknown time  No Yes   Sig: Take 1 Tab by mouth every four (4) hours as needed. Max Daily Amount: 24 mg. Indications: PAIN, Not to be filled. This prescription is for instructional purposes only. Patient has supply at home. NASONEX 50 mcg/actuation nasal spray Unknown at Unknown time  Yes No   Si Sprays by Both Nostrils route daily. PROAIR HFA 90 mcg/actuation inhaler 2017 at Unknown time  Yes Yes   Sig: Take 2 Puffs by inhalation two (2) times daily as needed. VITAMIN D2 50,000 unit capsule 2017 at Unknown time  Yes Yes   Si,000 Units every Tuesday and Thursday. acetaminophen (TYLENOL) 325 mg tablet Unknown at Unknown time  No No   Sig: Take 2 Tabs by mouth every six (6) hours. Indications: PAIN   albuterol (PROVENTIL VENTOLIN) 2.5 mg /3 mL (0.083 %) nebulizer solution Unknown at Unknown time  Yes No   Sig: as needed. aspirin delayed-release 325 mg tablet Not Taking at Unknown time  No No   Sig: Take 1 Tab by mouth two (2) times a day. Start after Lovenox therapy is complete. calcium carbonate (TUMS) 200 mg calcium (500 mg) chew 2017 at Unknown time  Yes Yes   Sig: Take 3 Tabs by mouth daily. calcium citrate-vitamin D3 (CITRACAL + D) tablet 2017 at Unknown time  Yes Yes   Sig: Take  by mouth two (2) times a day. cetirizine (ZYRTEC) 10 mg tablet 2017 at Unknown time  Yes Yes   Sig: Take 10 mg by mouth.   cloNIDine HCl (CATAPRES) 0.1 mg tablet Not Taking at Unknown time  Yes No   Si.1 mg three (3) times daily. cyanocobalamin 1,000 mcg tablet Not Taking at Unknown time  Yes No   Sig: Take 1,000 mcg by mouth daily. enoxaparin (LOVENOX) 40 mg/0.4 mL Not Taking at Unknown time  No No   Si.4 mL by SubCUTAneous route daily.  Indications: DEEP VEIN THROMBOSIS PREVENTION   fentaNYL (DURAGESIC) 75 mcg/hr 2017 at Unknown time  Yes Yes   Si Patch by TransDERmal route every seventy-two (72) hours. gabapentin (NEURONTIN) 300 mg capsule 2017 at Unknown time  Yes Yes   Si mg three (3) times daily. mometasone-formoterol (DULERA) 200-5 mcg/actuation HFA inhaler 2017 at Unknown time  Yes Yes   Sig: Take 1 Puff by inhalation two (2) times a day. montelukast (SINGULAIR) 10 mg tablet Unknown at Unknown time  Yes No   Sig: 10 mg nightly. senna-docusate (PERICOLACE) 8.6-50 mg per tablet Not Taking at Unknown time  No No   Sig: Take 1 Tab by mouth two (2) times a day. Indications: CONSTIPATION   tamoxifen (NOLVADEX) 20 mg tablet 2017 at Unknown time  Yes Yes   Si mg nightly. tiotropium (SPIRIVA) 18 mcg inhalation capsule 2017 at Unknown time  Yes Yes   Sig: Take 1 Cap by inhalation daily. traZODone (DESYREL) 50 mg tablet 2017 at Unknown time  Yes Yes   Sig: nightly.       Facility-Administered Medications: None       Hospital Medications:  Current Facility-Administered Medications   Medication Dose Route Frequency    0.9% sodium chloride infusion  100 mL/hr IntraVENous CONTINUOUS    sodium chloride (NS) flush 5-10 mL  5-10 mL IntraVENous Q8H    sodium chloride (NS) flush 5-10 mL  5-10 mL IntraVENous PRN    midazolam (VERSED) injection 0.25-10 mg  0.25-10 mg IntraVENous Multiple    fentaNYL citrate (PF) injection 100 mcg  100 mcg IntraVENous Multiple    naloxone (NARCAN) injection 0.4 mg  0.4 mg IntraVENous Multiple    flumazenil (ROMAZICON) 0.1 mg/mL injection 0.2 mg  0.2 mg IntraVENous Multiple    simethicone (MYLICON) 48TO/8.8JG oral drops 80 mg  1.2 mL Oral Multiple    diphenhydrAMINE (BENADRYL) injection 50 mg  50 mg IntraVENous ONCE    atropine injection 0.5 mg  0.5 mg IntraVENous ONCE PRN    EPINEPHrine (ADRENALIN) 0.1 mg/mL syringe 1 mg  1 mg Endoscopically ONCE PRN       Social History:  Social History   Substance Use Topics    Smoking status: Former Smoker     Packs/day: 0.50     Years: 3.00     Quit date: 2/11/1988    Smokeless tobacco: Never Used    Alcohol use No       Family History:  Family History   Problem Relation Age of Onset    Heart Disease Mother     Asthma Mother     Diabetes Mother     COPD Mother     Cancer Sister      MELANOMA    Heart Disease Brother     Asthma Brother     Cancer Sister      BREAST    Heart Attack Brother     Cancer Paternal Grandmother     Anesth Problems Neg Hx              Review of Systems:      Constitutional: negative fever, negative chills, negative weight loss  Eyes:   negative visual changes  ENT:   negative sore throat, tongue or lip swelling  Respiratory:  negative cough, negative dyspnea  Cards:  negative for chest pain, palpitations, lower extremity edema  GI:   See HPI  :  negative for frequency, dysuria  Integument:  negative for rash and pruritus  Heme:  negative for easy bruising and gum/nose bleeding  Musculoskel: negative for myalgias,  back pain and muscle weakness  Neuro: negative for headaches, dizziness, vertigo  Psych:  negative for feelings of anxiety, depression       Objective:   No data found. EXAM:     NEURO-a&o   HEENT-wnl   LUNGS-clear    COR-regular rate and rhythym     ABD-soft , no tenderness, no rebound, good bs     EXT-no edema     Data Review     No results for input(s): WBC, HGB, HCT, PLT, HGBEXT, HCTEXT, PLTEXT in the last 72 hours. No results for input(s): NA, K, CL, CO2, BUN, CREA, GLU, PHOS, CA in the last 72 hours. No results for input(s): SGOT, GPT, AP, TBIL, TP, ALB, GLOB, GGT, AML, LPSE in the last 72 hours. No lab exists for component: AMYP, HLPSE  No results for input(s): INR, PTP, APTT in the last 72 hours.     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  Federico Tolentino MD     8/21/2017  7:31 AM

## 2017-08-21 NOTE — PROGRESS NOTES

## 2017-08-21 NOTE — PROGRESS NOTES
Darcy Carpenter  1965  [id number]    Situation:  Verbal report received from:   Stefani Colunga RN  Procedure: Procedure(s):  ESOPHAGOGASTRODUODENOSCOPY (EGD)    Background:    Preoperative diagnosis: ABNORMAL WEIGHT GAIN  Postoperative diagnosis: 1.- Weight Gain post Gastric Bypass Surgery    :  Dr. Nereida Abrams  Assistant(s): Endoscopy Technician-1: Joanna Roach  Endoscopy RN-1: Malina Toth RN    Specimens: * No specimens in log *  H. Pylori  no    Assessment:  Intra-procedure medications      Anesthesia gave intra-procedure sedation and medications, see anesthesia flow sheet yes    Intravenous fluids: NS@ KVO     Vital signs stable   yes    Abdominal assessment: round and soft   yes    Recommendation:  Discharge patient per MD order  yes.   Return to floor  outpatient  Family or Friend spouse  Permission to share finding with family or friend yes

## 2017-08-21 NOTE — ANESTHESIA POSTPROCEDURE EVALUATION
Post-Anesthesia Evaluation and Assessment    Patient: Edel Alcantara MRN: [id number]  SSN: [id number]    YOB: 1965  Age: 47 y.o. Sex: female       Cardiovascular Function/Vital Signs  Visit Vitals    /56    Pulse 76    Temp 36.6 °C (97.8 °F)    Resp 17    SpO2 97%    Breastfeeding No       Patient is status post MAC anesthesia for Procedure(s):  ESOPHAGOGASTRODUODENOSCOPY (EGD). Nausea/Vomiting: None    Postoperative hydration reviewed and adequate. Pain:  Pain Scale 1: Numeric (0 - 10) (08/21/17 1468)  Pain Intensity 1: 0 (08/21/17 4448)   Managed    Neurological Status: At baseline    Mental Status and Level of Consciousness: Arousable    Pulmonary Status:   O2 Device: Room air (08/21/17 5081)   Adequate oxygenation and airway patent    Complications related to anesthesia: None    Post-anesthesia assessment completed.  No concerns    Signed By: Karen Andre MD     August 21, 2017

## 2017-08-21 NOTE — IP AVS SNAPSHOT
[address] 
[phone] Patient: Emma Jones MRN: [id number] :1965 You are allergic to the following Allergen Reactions Levaquin (Levofloxacin) Anaphylaxis Bactrim (Sulfamethoprim) Itching Morphine Other (comments) Recent Documentation Breastfeeding? OB Status Smoking Status No Hysterectomy Former Smoker Emergency Contacts Name Discharge Info Relation Home Work Mobile [address] CAREGIVER [3] Spouse [3] 35 66 [address] CAREGIVER [3] Other Relative [6] [phone] About your hospitalization You were admitted on:  2017 You last received care in the:  [facility] ENDOSCOPY You were discharged on:  2017 Unit phone number:  [phone] Why you were hospitalized Your primary diagnosis was:  Not on File Providers Seen During Your Hospitalizations Provider Role Specialty Primary office phone Pavel Russell MD Attending Provider Gastroenterology [phone] Your Primary Care Physician (PCP) Primary Care Physician Office Phone Office Fax [place], [address] [place] [address] [phone] Follow-up Information None Current Discharge Medication List  
  
CONTINUE these medications which have NOT CHANGED Dose & Instructions Dispensing Information Comments Morning Noon Evening Bedtime  
 acetaminophen 325 mg tablet Commonly known as:  TYLENOL Your last dose was: Your next dose is:    
   
   
 Dose:  650 mg Take 2 Tabs by mouth every six (6) hours. Indications: PAIN Quantity:  100 Tab Refills:  0  
     
   
   
   
  
 aspirin delayed-release 325 mg tablet Your last dose was:     
   
Your next dose is:    
   
   
 Dose:  325 mg  
 Take 1 Tab by mouth two (2) times a day. Start after Lovenox therapy is complete. Quantity:  60 Tab Refills:  0  
     
   
   
   
  
 calcium carbonate 200 mg calcium (500 mg) Chew Commonly known as:  TUMS Your last dose was: Your next dose is:    
   
   
 Dose:  3 Tab Take 3 Tabs by mouth daily. Refills:  0 CENTRUM SILVER PO Your last dose was: Your next dose is: Take  by mouth daily. Refills:  0  
     
   
   
   
  
 cetirizine 10 mg tablet Commonly known as:  ZYRTEC Your last dose was: Your next dose is:    
   
   
 Dose:  10 mg Take 10 mg by mouth. Refills:  0 Citracal + D tablet Generic drug:  calcium citrate-vitamin D3 Your last dose was: Your next dose is: Take  by mouth two (2) times a day. Refills:  0  
     
   
   
   
  
 cloNIDine HCl 0.1 mg tablet Commonly known as:  CATAPRES Your last dose was: Your next dose is:    
   
   
 Dose:  0.1 mg  
0.1 mg three (3) times daily. Refills:  2  
     
   
   
   
  
 cyanocobalamin 1,000 mcg tablet Your last dose was: Your next dose is:    
   
   
 Dose:  1000 mcg Take 1,000 mcg by mouth daily. Refills:  0 Dexlansoprazole 60 mg Cpdb Your last dose was: Your next dose is:    
   
   
 Dose:  30 mg Take 30 mg by mouth daily. Refills:  0 DULoxetine 60 mg capsule Commonly known as:  CYMBALTA Your last dose was: Your next dose is:    
   
   
 Dose:  60 mg  
60 mg two (2) times a day. Refills:  0  
     
   
   
   
  
 enoxaparin 40 mg/0.4 mL Commonly known as:  LOVENOX Your last dose was: Your next dose is:    
   
   
 Dose:  40 mg  
0.4 mL by SubCUTAneous route daily. Indications: DEEP VEIN THROMBOSIS PREVENTION Quantity:  7 Syringe Refills:  0 fentaNYL 75 mcg/hr Commonly known as:  Faythe Finer Your last dose was: Your next dose is:    
   
   
 Dose:  1 Patch 1 Patch by TransDERmal route every seventy-two (72) hours. Refills:  0  
     
   
   
   
  
 gabapentin 300 mg capsule Commonly known as:  NEURONTIN Your last dose was: Your next dose is:    
   
   
 Dose:  300 mg  
300 mg three (3) times daily. Refills:  1 HYDROmorphone 4 mg tablet Commonly known as:  DILAUDID Your last dose was: Your next dose is:    
   
   
 Dose:  4 mg Take 1 Tab by mouth every four (4) hours as needed. Max Daily Amount: 24 mg. Indications: PAIN, Not to be filled. This prescription is for instructional purposes only. Patient has supply at home. Quantity:  120 Tab Refills:  0  
     
   
   
   
  
 mometasone-formoterol 200-5 mcg/actuation HFA inhaler Commonly known as:  Neomia Anes Your last dose was: Your next dose is:    
   
   
 Dose:  1 Puff Take 1 Puff by inhalation two (2) times a day. Refills:  0  
     
   
   
   
  
 montelukast 10 mg tablet Commonly known as:  SINGULAIR Your last dose was: Your next dose is:    
   
   
 Dose:  10 mg  
10 mg nightly. Refills:  3 NASONEX 50 mcg/actuation nasal spray Generic drug:  mometasone Your last dose was: Your next dose is:    
   
   
 Dose:  2 Spray 2 Sprays by Both Nostrils route daily. Refills:  3  
     
   
   
   
  
 * albuterol 2.5 mg /3 mL (0.083 %) nebulizer solution Commonly known as:  PROVENTIL VENTOLIN Your last dose was: Your next dose is:    
   
   
 as needed. Refills:  3  
     
   
   
   
  
 * PROAIR HFA 90 mcg/actuation inhaler Generic drug:  albuterol Your last dose was: Your next dose is:    
   
   
 Dose:  2 Puff Take 2 Puffs by inhalation two (2) times daily as needed. Refills:  3 senna-docusate 8.6-50 mg per tablet Commonly known as:  Macrina Dine Your last dose was: Your next dose is:    
   
   
 Dose:  1 Tab Take 1 Tab by mouth two (2) times a day. Indications: CONSTIPATION Quantity:  60 Tab Refills:  0  
     
   
   
   
  
 tamoxifen 20 mg tablet Commonly known as:  NOLVADEX Your last dose was: Your next dose is:    
   
   
 Dose:  20 mg  
20 mg nightly. Refills:  11  
     
   
   
   
  
 tiotropium 18 mcg inhalation capsule Commonly known as:  Anand Rao Your last dose was: Your next dose is:    
   
   
 Dose:  1 Cap Take 1 Cap by inhalation daily. Refills:  0  
     
   
   
   
  
 traZODone 50 mg tablet Commonly known as:  Charity [place] Your last dose was: Your next dose is:    
   
   
 nightly. Refills:  11 VITAMIN D2 50,000 unit capsule Generic drug:  ergocalciferol Your last dose was: Your next dose is:    
   
   
 Dose:  51538 Units 50,000 Units every Tuesday and Thursday. Refills:  0  
     
   
   
   
  
 * Notice: This list has 2 medication(s) that are the same as other medications prescribed for you. Read the directions carefully, and ask your doctor or other care provider to review them with you. Discharge Instructions [address] 
[address] EGD DISCHARGE INSTRUCTIONS Georgianafroylandeven Mar [id number] 
1965 Discomfort: 
Sore throat- throat lozenges or warm salt water gargle 
redness at IV site- apply warm compress to area; if redness or soreness persist- contact your physician Gaseous discomfort- walking, belching will help relieve any discomfort You may not operate a vehicle for 12 hours You may not engage in an occupation involving machinery or appliances for rest of today You may not drink alcoholic beverages for at least 12 hours Avoid making any critical decisions for at least 24 hour DIET You may resume your regular diet  however -  remember your colon is empty and a heavy meal will produce gas. Avoid these foods:  vegetables, fried / greasy foods, carbonated drinks ACTIVITY You may resume your normal daily activities Spend the remainder of the day resting -  avoid any strenuous activity. CALL M.D. ANY SIGN OF Increasing pain, nausea, vomiting Abdominal distension (swelling) New increased bleeding (oral or rectal) Fever (chills) Pain in chest area Bloody discharge from nose or mouth Shortness of breath Follow-up Instructions: 
 Call Dr. Sona Wild for any questions or problems. Telephone # [phone] ENDOSCOPY FINDINGS: 
 Measurements of your gastric pouch and small intestine were taken for Dr. Lina Alcantar and he will be notified of the results. Signed By: Reyna Coffman. Alexsandra Myrick MD   
 8/21/2017  7:50 AM 
  
 
 
Discharge Orders None Introducing [facility]! Dano Huntley introduces [employer] patient portal. Now you can access parts of your medical record, email your doctor's office, and request medication refills online. 1. In your internet browser, go to [email or web address] 2. Click on the First Time User? Click Here link in the Sign In box. You will see the New Member Sign Up page. 3. Enter your [employer] Access Code exactly as it appears below. You will not need to use this code after youve completed the sign-up process. If you do not sign up before the expiration date, you must request a new code. · [employer] Access Code: [email or web address] Expires: 9/19/2017  1:39 PM 
 
4. Enter the last four digits of your Social Security Number (xxxx) and Date of Birth (mm/dd/yyyy) as indicated and click Submit. You will be taken to the next sign-up page. 5. Create a [employer] ID.  This will be your [employer] login ID and cannot be changed, so think of one that is secure and easy to remember. 6. Create a [employer] password. You can change your password at any time. 7. Enter your Password Reset Question and Answer. This can be used at a later time if you forget your password. 8. Enter your e-mail address. You will receive e-mail notification when new information is available in [address] 9. Click Sign Up. You can now view and download portions of your medical record. 10. Click the Download Summary menu link to download a portable copy of your medical information. If you have questions, please visit the Frequently Asked Questions section of the [employer] website. Remember, [employer] is NOT to be used for urgent needs. For medical emergencies, dial 911. Now available from your iPhone and Android! General Information Please provide this summary of care documentation to your next provider. Patient Signature:  ____________________________________________________________ Date:  ____________________________________________________________  
  
Tisha Rojas Provider Signature:  ____________________________________________________________ Date:  ____________________________________________________________

## 2017-08-21 NOTE — DISCHARGE INSTRUCTIONS
[address], [address] [place] [address]    EGD DISCHARGE INSTRUCTIONS    Nate Ríso  [id number]  1965    Discomfort:  Sore throat- throat lozenges or warm salt water gargle  redness at IV site- apply warm compress to area; if redness or soreness persist- contact your physician  Gaseous discomfort- walking, belching will help relieve any discomfort  You may not operate a vehicle for 12 hours  You may not engage in an occupation involving machinery or appliances for rest of today  You may not drink alcoholic beverages for at least 12 hours  Avoid making any critical decisions for at least 24 hour  DIET  You may resume your regular diet - however -  remember your colon is empty and a heavy meal will produce gas. Avoid these foods:  vegetables, fried / greasy foods, carbonated drinks    ACTIVITY  You may resume your normal daily activities   Spend the remainder of the day resting -  avoid any strenuous activity. CALL M.D. ANY SIGN OF   Increasing pain, nausea, vomiting  Abdominal distension (swelling)  New increased bleeding (oral or rectal)  Fever (chills)  Pain in chest area  Bloody discharge from nose or mouth  Shortness of breath    Follow-up Instructions:   Call Dr. Mikey Gerard for any questions or problems. Telephone # [phone]    ENDOSCOPY FINDINGS:   Measurements of your gastric pouch and small intestine were taken for Dr. Anahy Kaiser and he will be notified of the results. Signed By: Isabelle Greenberg.  Jassi Li MD     8/21/2017  7:50 AM

## 2017-08-28 ENCOUNTER — OFFICE VISIT (OUTPATIENT)
Dept: SURGERY | Age: 52
End: 2017-08-28

## 2017-08-28 VITALS
TEMPERATURE: 97.7 F | DIASTOLIC BLOOD PRESSURE: 84 MMHG | BODY MASS INDEX: 42.65 KG/M2 | HEIGHT: 65 IN | SYSTOLIC BLOOD PRESSURE: 130 MMHG | RESPIRATION RATE: 20 BRPM | WEIGHT: 256 LBS | HEART RATE: 64 BPM

## 2017-08-28 DIAGNOSIS — K21.9 GASTROESOPHAGEAL REFLUX DISEASE, ESOPHAGITIS PRESENCE NOT SPECIFIED: ICD-10-CM

## 2017-08-28 DIAGNOSIS — E66.01 MORBID OBESITY WITH BMI OF 40.0-44.9, ADULT (HCC): Primary | ICD-10-CM

## 2017-08-28 DIAGNOSIS — R63.5 ABNORMAL WEIGHT GAIN: ICD-10-CM

## 2017-08-28 NOTE — PROGRESS NOTES
1. Have you been to the ER, urgent care clinic since your last visit? Hospitalized since your last visit? No    2. Have you seen or consulted any other health care providers outside of the [address] [place] [address] since your last visit? Include any pap smears or colon screening.  No

## 2017-08-28 NOTE — PROGRESS NOTES
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 ([facility] 03/12/2016     Resolved Ambulatory Problems     Diagnosis Date Noted    No Resolved Ambulatory Problems     Past Medical History:   Diagnosis Date    Arthritis     Asthma     Autoimmune disease ([facility]     Cancer ([facility] 2012    Chronic pain     Fall     Headache     Ill-defined condition 2015    Nausea & vomiting     Sarcoidosis ([facility]     Sleep apnea      Current Outpatient Prescriptions on File Prior to Visit   Medication Sig Dispense Refill    calcium citrate-vitamin D3 (CITRACAL + D) tablet Take  by mouth two (2) times a day.  acetaminophen (TYLENOL) 325 mg tablet Take 2 Tabs by mouth every six (6) hours. Indications: PAIN 100 Tab 0    HYDROmorphone (DILAUDID) 4 mg tablet Take 1 Tab by mouth every four (4) hours as needed. Max Daily Amount: 24 mg. Indications: PAIN, Not to be filled. This prescription is for instructional purposes only. Patient has supply at home. 120 Tab 0    tiotropium (SPIRIVA) 18 mcg inhalation capsule Take 1 Cap by inhalation daily.  mometasone-formoterol (DULERA) 200-5 mcg/actuation HFA inhaler Take 1 Puff by inhalation two (2) times a day.  cetirizine (ZYRTEC) 10 mg tablet Take 10 mg by mouth.  calcium carbonate (TUMS) 200 mg calcium (500 mg) chew Take 3 Tabs by mouth daily.  fentaNYL (DURAGESIC) 75 mcg/hr 1 Patch by TransDERmal route every seventy-two (72) hours.  gabapentin (NEURONTIN) 300 mg capsule 300 mg three (3) times daily. 1    DULoxetine (CYMBALTA) 60 mg capsule 60 mg two (2) times a day.  tamoxifen (NOLVADEX) 20 mg tablet 20 mg nightly.   11    VITAMIN D2 50,000 unit capsule 50,000 Units every Tuesday and Thursday. 0    traZODone (DESYREL) 50 mg tablet nightly. Purje 57 90 mcg/actuation inhaler Take 2 Puffs by inhalation two (2) times daily as needed. 3    NASONEX 50 mcg/actuation nasal spray 2 Sprays by Both Nostrils route daily. 3    montelukast (SINGULAIR) 10 mg tablet 10 mg nightly. 3    albuterol (PROVENTIL VENTOLIN) 2.5 mg /3 mL (0.083 %) nebulizer solution as needed. 3    cloNIDine HCl (CATAPRES) 0.1 mg tablet 0.1 mg three (3) times daily. 2    cyanocobalamin 1,000 mcg tablet Take 1,000 mcg by mouth daily.  FOLIC ACID/MULTIVIT-MIN/LUTEIN (CENTRUM SILVER PO) Take  by mouth daily.  enoxaparin (LOVENOX) 40 mg/0.4 mL 0.4 mL by SubCUTAneous route daily. Indications: DEEP VEIN THROMBOSIS PREVENTION 7 Syringe 0    aspirin delayed-release 325 mg tablet Take 1 Tab by mouth two (2) times a day. Start after Lovenox therapy is complete. 60 Tab 0    senna-docusate (PERICOLACE) 8.6-50 mg per tablet Take 1 Tab by mouth two (2) times a day. Indications: CONSTIPATION 60 Tab 0    Dexlansoprazole 60 mg CpDB Take 30 mg by mouth daily. No current facility-administered medications on file prior to visit.       /84 (BP 1 Location: Right arm, BP Patient Position: At rest)  Pulse 64  Temp 97.7 °F (36.5 °C)  Resp 20  Ht 5' 5\" (1.651 m)  Wt 256 lb (116.1 kg)  BMI 42.6 kg/m2  ENMT: normocephalic, atraumatic   Respiratory: excursions normal and symmetrical  Cardiovascular: Regular rate and rhythm  Abdomen:  Soft, no guarding, no distension, no obvious hernia  Musculoskeletal: normal gait/station  Neuro: symmetrical  Psych: alert and oriented to person, place and time    Impression/plan  Very large gastric pouch- Radiologist estimates 600 cc capacity  Elongated blind end of Amrik limb, somewhat dilated GJ outlet  Patient has been working with her PCP for months prior to beginning her efforts with Milton Tejeda RD  She does have GERD particularly severe at night with reflux/regurg/burning  Will pursue health insurance approve for revision of very abnormal gastric bypass anatomy  She demonstrates large portion eating consistent with this abnormal anatomy s/p Lap RYGB  Will review with insurance approval staff and RD to try and move our efforts forward    More than 50% of this encounter was spent in direct counseling for this patient Counseling included topics such as the ongoing evaluation and treatment as well as options for future care. All questions have been answered in detail and the patient has expressed satisfaction regarding understanding of all this information. At least 25 minutes were spent in direct patient contact including more than 50% of the time spent directly counseling the patient as above during this encounter.

## 2017-09-19 ENCOUNTER — CLINICAL SUPPORT (OUTPATIENT)
Dept: SURGERY | Age: 52
End: 2017-09-19

## 2017-09-19 VITALS — WEIGHT: 257 LBS | BODY MASS INDEX: 42.77 KG/M2

## 2017-09-19 DIAGNOSIS — E66.01 MORBID OBESITY WITH BMI OF 40.0-44.9, ADULT (HCC): Primary | ICD-10-CM

## 2017-09-19 NOTE — PROGRESS NOTES
[address] [facility] at [facility]  Supervised Weight Loss     Date:   2017    Patient's Name: Ella Eli  : 1965    Insurance:  [employer]          Session:   Surgery: Gastric Bypass Revision   Surgeon:  Renu Elmore M.D. Height: 65\"   Weight:    257      Lbs. BMI: 42   Pounds Lost since last month: 6#               Pounds Gained since last month: 0    Starting Weight: 263#   Previous Months Weight: 263#  Overall Pounds Lost: 6#  Overall Pounds Gained: 0    Other Pertinent Information: n/a     Smoking Status:  None   Alcohol Intake: none     I have reviewed with patient the guidelines of the supervised weight loss class. Patient understands the expectations of some weight loss during the weight loss trial.  Patient understands that weight gain could delay the process. I have also expressed to patient that classes need to be consecutive. Missing a class may subject patient to have to start their trial over. Patient has received this information in writing. Changes that patient has made since last month include:  Reduced soda intake, increased water intake, eating more protein, using protein shakes in place of skipping meals. Eating Habits and Behaviors      Today we reviewed the general diet principles for weight loss surgery. A nutrition education lesson was provided specifically on portion control both before and after surgery. Their plate should be made up of 1/2 coming from non-starchy vegetables, 1/4 coming from lean meat, and 1/4 of their plate coming from carbohydrates, including fruits, starches, or milk. We discussed the importance of measuring meals to 1/2 cup (4 oz) after surgery to prevent overeating. Food models and measuring utensils were used to demonstrated portion control. Emphasis was placed on the importance of eating 3 meals a day and aiming for 60 grams of protein per day.  We talked about cooking more meals at home, less eating out, reduced intake of sweets and added sugars and drinking only calorie-free, non-carbonated fluids. Patient's current diet habits include: Pt is consuming 2-3 meals per day. Reports still skipping meals on occasion d/t lack of appetite/hunger and busy schedule with kids back in school. Will often use a protein shake in place of skipping the meals. Has been using a protein shake that would normally not be recommended d/t calorie and sugar content. Reviewed current list of approved shakes. Pt reports drinking more water and Crystal Light and reduced diet soda intake. Is reading food labels to identify added sugars to help promote weight loss and prevent dumping syndrome. Physical Activity/Exercise  During class we discussed the importance of increasing daily physical activity and beginning to develop an exercise regimen/routine. An education lesson was provided including exercise programs and resources, tips for getting started and overcoming barriers and health benefits of exercise. We discussed that exercise is an important part of long term weight loss. Comments:  During class, I discussed with patient the importance of getting into an exercise routine. Patient is currently not exercising d/t physical limitations and pain that limit activity. Patient has been encouraged to consider chair exercises or resume previous heated pool exercise. Behavior Modification       Comments: We discussed the importance of eating mindfully after weight loss surgery to prevent food intolerance and prevent weight regain. We talked about how to eat more mindfully and identify emotional eating triggers. Tips and recommendations for how to make these changes were provided. Patient was encouraged to keep a food journal and record what they were taking in daily. Overall Assessment: Patient has demonstrated appropriate lifestyle changes this past month evidenced by 6# wt loss and reported changes. Will continue to educate patient on the nutrition guidelines in preparation for gastric bypass revision as she works to complete supervised weight loss requirements. Goals:   1. Nutrition - continue to work on 3 meals a day, use approved protein shakes PRN  2. Exercise - maintain or resume heated pool exercise as tolerated   3.  Behavior - not drinking with meals     Nat Silva, KATHERYN  9/19/2017

## 2017-10-26 ENCOUNTER — CLINICAL SUPPORT (OUTPATIENT)
Dept: SURGERY | Age: 52
End: 2017-10-26

## 2017-10-26 VITALS — BODY MASS INDEX: 42.93 KG/M2 | WEIGHT: 258 LBS

## 2017-10-26 DIAGNOSIS — E66.01 MORBID OBESITY WITH BMI OF 40.0-44.9, ADULT (HCC): Primary | ICD-10-CM

## 2017-11-29 ENCOUNTER — CLINICAL SUPPORT (OUTPATIENT)
Dept: SURGERY | Age: 52
End: 2017-11-29

## 2017-11-29 VITALS — WEIGHT: 265 LBS | BODY MASS INDEX: 44.1 KG/M2

## 2017-11-29 DIAGNOSIS — E66.01 MORBID OBESITY WITH BMI OF 40.0-44.9, ADULT (HCC): Primary | ICD-10-CM

## 2017-11-29 NOTE — PROGRESS NOTES
[address] [facility] Surgery at [facility]  Supervised Weight Loss     Date:   2017    Patient's Name: Mohan Angeles  : 1965    Insurance:  [employer] [facility]          Session:   Surgery: Gastric Bypass Revision  Surgeon:  Hernan Sepulveda M.D. Height: 65\"   Weight:    265      Lbs. BMI:  44  Pounds Lost since last month: 0               Pounds Gained since last month: 7#    Starting Weight: 263#   Previous Months Weight: 258#  Overall Pounds Lost: 0  Overall Pounds Gained: 2#    Other Pertinent Information: Patient attributes wt gain d/t steroid medications and increased stress r/t mother's illness/hospitalization    Smoking Status:  none  Alcohol Intake: none    I have reviewed with patient the guidelines of the supervised weight loss class. Patient understands the expectations of some weight loss during the weight loss trial.  Patient understands that weight gain could delay the process. I have also expressed to patient that classes need to be consecutive. Missing a class may subject patient to have to start their trial over. Patient has received this information in writing. Changes that patient has made since last month include:  No changes this past month d/t being on steroids and irregular eating habits and patterns d/t mother's recent hospitalization. Eating Habits and Behaviors      Today we reviewed the general diet principles for weight loss surgery. An education lesson was provided specific to carbohydrates. We discussed the difference between refined and complex carbohydrates, label reading to identify added sugars vs natural sugars and portion control of carbohydrates. Their plate should be made up of 1/2 coming from non-starchy vegetables, 1/4 coming from lean meat, and 1/4 of their plate coming from carbohydrates, including fruits, starches, or milk. We discussed the importance of measuring meals to 1/2 cup (4 oz) after surgery to prevent overeating.  Emphasis was placed on the importance of eating 3 meals a day and aiming for 60 grams of protein per day. We talked about cooking more meals at home, less eating out, reduced intake of sweets and added sugars and drinking only calorie-free, non-carbonated fluids. Patient's current diet habits include: Pt does not report how many meals per day she is consuming. Snacking on \"anything from the vending machine at the hospital\". Eating pretzels and crackers and avoiding sweets/desserts. Eating a mixture of baked, grilled, broiled and some fried foods. Eating out is 1-3 times per week this past month. Drinking 50 oz, diet soda 16 oz per day, 8 oz Crystal Light per day. Denies emotional eating and states more likely to not eat when stressed. Sometimes packing meals when away from home. Eating most meals at a table or in bed and takes 10-15 minutes to finish the meal. Reports overeating, grazing, late night eating, snacking are biggest barriers to weight loss at this time. Physical Activity/Exercise  During class we discussed the importance of increasing daily physical activity and beginning to develop an exercise regimen/routine. We discussed that exercise is an important part of long term weight loss. Comments:  During class, I discussed with patient the importance of getting into an exercise routine. Patient is currently walking and swimming - but none this past month. Patient has been encouraged to resume exercise as soon as able. Behavior Modification       Comments: We discussed the importance of eating mindfully after weight loss surgery to prevent food intolerance and prevent weight regain. We talked about how to eat more mindfully and identify emotional eating triggers. Tips and recommendations for how to make these changes were provided. Patient was encouraged to keep a food journal and record what they were taking in daily.          Overall Assessment: Patient demonstrated limited compliance with nutrition recommendations this past month stating irregular eating habits/patterns d/t mother's recent hospitalization and weight gain d/t steroid medications. Pt has been encouraged to resume previously made changes and refocus on small/realistic lifestyle changes this next month to resume wt loss. Will continue to assess as pt works to complete supervised weight loss requirements. Patient-Set Goals:   1. Nutrition - eat 3 meals a day, limit snacking/grazing  2. Exercise - walking dog 2 times per day, [employer] 2-3 times per week  3.  Behavior -read food labels     Linda Coleman, KATHERYN  11/29/2017

## 2017-12-06 ENCOUNTER — OFFICE VISIT (OUTPATIENT)
Dept: SURGERY | Age: 52
End: 2017-12-06

## 2017-12-06 VITALS
WEIGHT: 264 LBS | OXYGEN SATURATION: 94 % | RESPIRATION RATE: 20 BRPM | DIASTOLIC BLOOD PRESSURE: 83 MMHG | TEMPERATURE: 98.2 F | HEIGHT: 65 IN | SYSTOLIC BLOOD PRESSURE: 125 MMHG | BODY MASS INDEX: 43.99 KG/M2 | HEART RATE: 86 BPM

## 2017-12-06 DIAGNOSIS — K90.9 INTESTINAL MALABSORPTION, UNSPECIFIED TYPE: Primary | ICD-10-CM

## 2017-12-06 NOTE — PROGRESS NOTES
1. Have you been to the ER, urgent care clinic since your last visit? Hospitalized since your last visit? No    2. Have you seen or consulted any other health care providers outside of the [address] [place] [address] since your last visit? Include any pap smears or colon screening.  No

## 2017-12-06 NOTE — MR AVS SNAPSHOT
Visit Information Date & Time Provider Department Dept. Phone Encounter #  
 12/6/2017  9:30 AM Hector Ng MD [address] [facility] [place] [place] [address] 6415 [id number] Your Appointments 12/20/2017 11:00 AM  
NUTRITION COUNSELING with Chloe Hill RD [address] ([facility]) Appt Note: supervised wt loss [address] [facility]  [address] [facility] [address]  
[phone]  
  
   
 [address] [place] [address] [address] [place] [address] [address] [phone] Upcoming Health Maintenance Date Due DTaP/Tdap/Td series (1 - Tdap) 3/7/1987 PAP AKA CERVICAL CYTOLOGY 3/7/1987 BREAST CANCER SCRN MAMMOGRAM 3/7/2016 FOBT Q 1 YEAR AGE 50-75 3/7/2016 Influenza Age 5 to Adult 8/1/2017 Allergies as of 12/6/2017  Review Complete On: 12/6/2017 By: Ridge Forget Barley Severity Noted Reaction Type Reactions Levaquin [Levofloxacin] High 06/19/2015    Anaphylaxis Bactrim [Sulfamethoprim]  08/21/2017    Itching Morphine  06/19/2015    Other (comments) Current Immunizations  Reviewed on 2/27/2016 No immunizations on file. Not reviewed this visit You Were Diagnosed With   
  
 Codes Comments Intestinal malabsorption, unspecified type    -  Primary ICD-10-CM: K90.9 ICD-9-CM: 579.9 Vitals BP Pulse Temp Resp Height(growth percentile) Weight(growth percentile) 125/83 (BP 1 Location: Left arm, BP Patient Position: Sitting) 86 98.2 °F (36.8 °C) (Oral) 20 5' 5\" (1.651 m) 264 lb (119.7 kg) SpO2 BMI OB Status Smoking Status 94% 43.93 kg/m2 Hysterectomy Former Smoker BMI and BSA Data Body Mass Index Body Surface Area  
 43.93 kg/m 2 2.34 m 2 Preferred Pharmacy [facility] [address]  [facility] [phone] Your Updated Medication List  
  
   
 This list is accurate as of: 12/6/17 11:20 AM.  Always use your most recent med list.  
  
  
  
  
 acetaminophen 325 mg tablet Commonly known as:  TYLENOL Take 2 Tabs by mouth every six (6) hours. Indications: PAIN  
  
 aspirin delayed-release 325 mg tablet Take 1 Tab by mouth two (2) times a day. Start after Lovenox therapy is complete. calcium carbonate 200 mg calcium (500 mg) Chew Commonly known as:  TUMS Take 3 Tabs by mouth daily. CENTRUM SILVER PO Take  by mouth daily. cetirizine 10 mg tablet Commonly known as:  ZYRTEC Take 10 mg by mouth. Citracal + D tablet Generic drug:  calcium citrate-vitamin D3 Take  by mouth two (2) times a day. cloNIDine HCl 0.1 mg tablet Commonly known as:  CATAPRES  
0.1 mg three (3) times daily. cyanocobalamin 1,000 mcg tablet Take 1,000 mcg by mouth daily. Dexlansoprazole 60 mg Cpdb Take 30 mg by mouth daily. DULoxetine 60 mg capsule Commonly known as:  CYMBALTA 60 mg two (2) times a day. enoxaparin 40 mg/0.4 mL Commonly known as:  LOVENOX  
0.4 mL by SubCUTAneous route daily. Indications: DEEP VEIN THROMBOSIS PREVENTION  
  
 fentaNYL 75 mcg/hr Commonly known as:  DURAGESIC  
1 Patch by TransDERmal route every seventy-two (72) hours. gabapentin 300 mg capsule Commonly known as:  NEURONTIN  
300 mg three (3) times daily. HYDROmorphone 4 mg tablet Commonly known as:  DILAUDID Take 1 Tab by mouth every four (4) hours as needed. Max Daily Amount: 24 mg. Indications: PAIN, Not to be filled. This prescription is for instructional purposes only. Patient has supply at home. mometasone-formoterol 200-5 mcg/actuation HFA inhaler Commonly known as:  Yordy Soles Take 1 Puff by inhalation two (2) times a day. montelukast 10 mg tablet Commonly known as:  SINGULAIR  
10 mg nightly. NASONEX 50 mcg/actuation nasal spray Generic drug:  mometasone 2 Sprays by Both Nostrils route daily. * albuterol 2.5 mg /3 mL (0.083 %) nebulizer solution Commonly known as:  PROVENTIL VENTOLIN  
as needed. * PROAIR HFA 90 mcg/actuation inhaler Generic drug:  albuterol Take 2 Puffs by inhalation two (2) times daily as needed. senna-docusate 8.6-50 mg per tablet Commonly known as:  Natalia Lock Take 1 Tab by mouth two (2) times a day. Indications: CONSTIPATION  
  
 tamoxifen 20 mg tablet Commonly known as:  NOLVADEX  
20 mg nightly. tiotropium 18 mcg inhalation capsule Commonly known as:  Erleen Del Valle Take 1 Cap by inhalation daily. traZODone 50 mg tablet Commonly known as:  DESYREL  
nightly. VITAMIN D2 50,000 unit capsule Generic drug:  ergocalciferol 50,000 Units every Tuesday and Thursday. * Notice: This list has 2 medication(s) that are the same as other medications prescribed for you. Read the directions carefully, and ask your doctor or other care provider to review them with you. We Performed the Following CBC W/O DIFF [19906 CPT(R)] FERRITIN [34809 CPT(R)] IRON [id number] CPT(R)] METABOLIC PANEL, COMPREHENSIVE [76654 CPT(R)] VITAMIN B1, WHOLE BLOOD [id number] CPT(R)] VITAMIN B12 [id number] CPT(R)] Introducing [facility]! Mikey Rodriguez introduces [employer] patient portal. Now you can access parts of your medical record, email your doctor's office, and request medication refills online. 1. In your internet browser, go to [email or web address] 2. Click on the First Time User? Click Here link in the Sign In box. You will see the New Member Sign Up page. 3. Enter your [employer] Access Code exactly as it appears below. You will not need to use this code after youve completed the sign-up process. If you do not sign up before the expiration date, you must request a new code. · [employer] Access Code: [email or web address] Expires: 3/6/2018 11:20 AM 
 
 4. Enter the last four digits of your Social Security Number (xxxx) and Date of Birth (mm/dd/yyyy) as indicated and click Submit. You will be taken to the next sign-up page. 5. Create a [facility] [employer] ID. This will be your [employer] login ID and cannot be changed, so think of one that is secure and easy to remember. 6. Create a [employer] password. You can change your password at any time. 7. Enter your Password Reset Question and Answer. This can be used at a later time if you forget your password. 8. Enter your e-mail address. You will receive e-mail notification when new information is available in [address] 9. Click Sign Up. You can now view and download portions of your medical record. 10. Click the Download Summary menu link to download a portable copy of your medical information. If you have questions, please visit the Frequently Asked Questions section of the [employer] website. Remember, [employer] is NOT to be used for urgent needs. For medical emergencies, dial 911. Now available from your iPhone and Android! Please provide this summary of care documentation to your next provider. Your primary care clinician is listed as Alanna Inman. If you have any questions after today's visit, please call [phone].

## 2017-12-11 LAB
ALBUMIN SERPL-MCNC: 4.1 G/DL (ref 3.5–5.5)
ALBUMIN/GLOB SERPL: 1.9 {RATIO} (ref 1.2–2.2)
ALP SERPL-CCNC: 80 IU/L (ref 39–117)
ALT SERPL-CCNC: 17 IU/L (ref 0–32)
AST SERPL-CCNC: 20 IU/L (ref 0–40)
BILIRUB SERPL-MCNC: 0.2 MG/DL (ref 0–1.2)
BUN SERPL-MCNC: 8 MG/DL (ref 6–24)
BUN/CREAT SERPL: 10 (ref 9–23)
CALCIUM SERPL-MCNC: 8.3 MG/DL (ref 8.7–10.2)
CHLORIDE SERPL-SCNC: 103 MMOL/L (ref 96–106)
CO2 SERPL-SCNC: 25 MMOL/L (ref 18–29)
CREAT SERPL-MCNC: 0.78 MG/DL (ref 0.57–1)
ERYTHROCYTE [DISTWIDTH] IN BLOOD BY AUTOMATED COUNT: 14.3 % (ref 12.3–15.4)
FERRITIN SERPL-MCNC: 90 NG/ML (ref 15–150)
GFR SERPLBLD CREATININE-BSD FMLA CKD-EPI: 102 ML/MIN/1.73
GFR SERPLBLD CREATININE-BSD FMLA CKD-EPI: 88 ML/MIN/1.73
GLOBULIN SER CALC-MCNC: 2.2 G/DL (ref 1.5–4.5)
GLUCOSE SERPL-MCNC: 109 MG/DL (ref 65–99)
HCT VFR BLD AUTO: 38 % (ref 34–46.6)
HGB BLD-MCNC: 12.3 G/DL (ref 11.1–15.9)
IRON SERPL-MCNC: 63 UG/DL (ref 27–159)
MCH RBC QN AUTO: 30.8 PG (ref 26.6–33)
MCHC RBC AUTO-ENTMCNC: 32.4 G/DL (ref 31.5–35.7)
MCV RBC AUTO: 95 FL (ref 79–97)
PLATELET # BLD AUTO: 237 X10E3/UL (ref 150–379)
POTASSIUM SERPL-SCNC: 4.1 MMOL/L (ref 3.5–5.2)
PROT SERPL-MCNC: 6.3 G/DL (ref 6–8.5)
RBC # BLD AUTO: 4 X10E6/UL (ref 3.77–5.28)
SODIUM SERPL-SCNC: 144 MMOL/L (ref 134–144)
VIT B1 BLD-SCNC: 125.8 NMOL/L (ref 66.5–200)
VIT B12 SERPL-MCNC: 449 PG/ML (ref 232–1245)
WBC # BLD AUTO: 4 X10E3/UL (ref 3.4–10.8)

## 2017-12-20 ENCOUNTER — CLINICAL SUPPORT (OUTPATIENT)
Dept: SURGERY | Age: 52
End: 2017-12-20

## 2017-12-20 DIAGNOSIS — E66.01 MORBID OBESITY WITH BMI OF 40.0-44.9, ADULT (HCC): Primary | ICD-10-CM

## 2017-12-21 VITALS — BODY MASS INDEX: 42.93 KG/M2 | WEIGHT: 258 LBS

## 2017-12-21 NOTE — PROGRESS NOTES
[id number] [facility] Surgery at [facility]  Supervised Weight Loss     Date:   2017    Patient's Name: Isaias Crowder  : 1965    Insurance:  Kit Fine          Session:   Surgery: Gastric Bypass Revision  Surgeon:  Stacy Tran M.D. Height: 65\"   Weight:    258      Lbs. BMI: 42   Pounds Lost since last month: 7#               Pounds Gained since last month: 0    Starting Weight: 263#   Previous Months Weight: 265#  Overall Pounds Lost: 5#  Overall Pounds Gained: 0    Other Pertinent Information: n/a     Smoking Status:  none  Alcohol Intake: none    I have reviewed with patient the guidelines of the supervised weight loss class. Patient understands the expectations of some weight loss during the weight loss trial.  Patient understands that weight gain could delay the process. I have also expressed to patient that classes need to be consecutive. Missing a class may subject patient to have to start their trial over. Patient has received this information in writing. Changes that patient has made since last month include:  Walking more, trying to practice portion control. Eating Habits and Behaviors      Today we reviewed the general diet principles for weight loss surgery. An education lesson was provided specific to protein. We discussed why protein is important after surgery, how much protein is needed per day (60-80 grams) and how to achieve protein goals. We discussed various food sources of protein and how many grams of protein per serving. We discussed how to use protein supplements, powders and shakes and how to purchase these products. Emphasis was placed on the importance of eating 3 meals a day to help promote weight loss and achieve protein goals. We talked about healthy methods of cooking and cooking tips to help with better tolerance of protein foods after surgery. Patient's current diet habits include: eating 3 meals a day.  Sometimes snacking on fruit and crackers. Eating crackers and pasta \"sometimes but not every meal\" and denies intake of sweets and desserts. Eating a mixture of baked, grilled, broiled and some fried foods. Eating out is 1-3 times per week. Drinking 48 oz water, 12-24 oz diet soda daily. Denies emotional and situational eating and reports reading inspirational quotes as non-food coping strategies. Packing meals when away from home. Eating most meals at a table and takes 10-15 minutes to finish the meal. Reports lack of activity and snacking as biggest barriers to weight loss. 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 Physical Activity/Exercise  During class we discussed the importance of increasing daily physical activity and beginning to develop an exercise regimen/routine. We discussed that exercise is an important part of long term weight loss. Comments:  During class, I discussed with patient the importance of getting into an exercise routine. Patient is currently not exercising d/t hip pain and lack of time d/t taking care of her mother. Patient has been encouraged to consider short intervals of exercise or water based exercise. Behavior Modification       Comments: We discussed the importance of eating mindfully after weight loss surgery to prevent food intolerance and prevent weight regain. We talked about how to eat more mindfully and identify emotional eating triggers. Tips and recommendations for how to make these changes were provided. Patient was encouraged to keep a food journal and record what they were taking in daily. Overall Assessment: Patient demonstrates some lifestyle changes evidenced by weight loss and reported changes. Will continue to assess as pt works to complete supervised weight loss requirements. Patient-Set Goals:   1.  Nutrition - continue 3 meals a day, replace crackers with yogurt  2. Exercise - more around the house, resume walking the dog   3.  Behavior -focus on doing better the very next day, not getting off track    Roman Saunders, KATHERYN  12/21/2017

## 2017-12-28 ENCOUNTER — HOSPITAL ENCOUNTER (OUTPATIENT)
Dept: GENERAL RADIOLOGY | Age: 52
Discharge: HOME | End: 2017-12-28
Attending: ORTHOPAEDIC SURGERY
Payer: COMMERCIAL

## 2017-12-28 DIAGNOSIS — M16.11 PRIMARY OSTEOARTHRITIS OF RIGHT HIP: ICD-10-CM

## 2017-12-28 PROCEDURE — 74011636320 HC RX REV CODE- 636/320: Performed by: RADIOLOGY

## 2017-12-28 PROCEDURE — 20610 DRAIN/INJ JOINT/BURSA W/O US: CPT

## 2017-12-28 PROCEDURE — 74011000250 HC RX REV CODE- 250: Performed by: RADIOLOGY

## 2017-12-28 PROCEDURE — 74011250636 HC RX REV CODE- 250/636: Performed by: RADIOLOGY

## 2017-12-28 RX ORDER — LIDOCAINE HYDROCHLORIDE 10 MG/ML
1 INJECTION INFILTRATION; PERINEURAL
Status: COMPLETED | OUTPATIENT
Start: 2017-12-28 | End: 2017-12-28

## 2017-12-28 RX ORDER — BUPIVACAINE HYDROCHLORIDE 5 MG/ML
5 INJECTION, SOLUTION EPIDURAL; INTRACAUDAL
Status: COMPLETED | OUTPATIENT
Start: 2017-12-28 | End: 2017-12-28

## 2017-12-28 RX ORDER — TRIAMCINOLONE ACETONIDE 40 MG/ML
40 INJECTION, SUSPENSION INTRA-ARTICULAR; INTRAMUSCULAR
Status: COMPLETED | OUTPATIENT
Start: 2017-12-28 | End: 2017-12-28

## 2017-12-28 RX ADMIN — LIDOCAINE HYDROCHLORIDE 5 ML: 10 INJECTION, SOLUTION INFILTRATION; PERINEURAL at 16:02

## 2017-12-28 RX ADMIN — TRIAMCINOLONE ACETONIDE 40 MG: 40 INJECTION, SUSPENSION INTRA-ARTICULAR; INTRAMUSCULAR at 16:02

## 2017-12-28 RX ADMIN — BUPIVACAINE HYDROCHLORIDE 25 MG: 5 INJECTION, SOLUTION EPIDURAL; INTRACAUDAL at 16:01

## 2017-12-28 RX ADMIN — IOHEXOL 5 ML: 240 INJECTION, SOLUTION INTRATHECAL; INTRAVASCULAR; INTRAVENOUS; ORAL at 16:01

## 2017-12-28 NOTE — PROGRESS NOTES
Returns for ongoing follow up / assessment  Remote Lap RYGB by Dr Claude Combe  Has had increasing pain since weight regain-  Now has an implanted nerve stimulator- spine  Lots of stress as mom had emergency surgery recently  Note she no longer takes lovenox- she did take it prophylacticly after hip surgery but not a current med    She has very significant GERD sx- has a Pulmonary specialist- Dr Pablo Grant at [facility] office of [facility]  Very hoarse voice- worst in the morning when she awakens, coughing  Has had multiple admissions for Asthma exacerbations  Regurgitation of food but has not spit up any liquid  Sleeps on 7 pillows to prevent episodes of coughing / laryngospasm with GERD during night  Not taking meds for GERD- says she has tried multiple meds and they \"don't work\"    EGD showed retained food in pouch  UGI showed significant GE reflux  Also long blind end of Amrik present    Active Ambulatory Problems     Diagnosis Date Noted    Sarcoidosis     Cramps, muscle, general 06/19/2015    Bilateral low back pain without sciatica 06/19/2015    Primary localized osteoarthritis of left hip 02/26/2016    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 ([facility] [address] 03/12/2016     Resolved Ambulatory Problems     Diagnosis Date Noted    No Resolved Ambulatory Problems     Past Medical History:   Diagnosis Date    Arthritis     Asthma     Autoimmune disease ([facility] [address]     Cancer ([facility] [address] 2012    Chronic pain     Fall     Headache     Ill-defined condition 2015    Nausea & vomiting     Sarcoidosis     Sleep apnea      Current Outpatient Prescriptions on File Prior to Visit   Medication Sig Dispense Refill    calcium citrate-vitamin D3 (CITRACAL + D) tablet Take  by mouth two (2) times a day.  acetaminophen (TYLENOL) 325 mg tablet Take 2 Tabs by mouth every six (6) hours.  Indications: PAIN 100 Tab 0    HYDROmorphone (DILAUDID) 4 mg tablet Take 1 Tab by mouth every four (4) hours as needed. Max Daily Amount: 24 mg. Indications: PAIN, Not to be filled. This prescription is for instructional purposes only. Patient has supply at home. 120 Tab 0    tiotropium (SPIRIVA) 18 mcg inhalation capsule Take 1 Cap by inhalation daily.  mometasone-formoterol (DULERA) 200-5 mcg/actuation HFA inhaler Take 1 Puff by inhalation two (2) times a day.  cetirizine (ZYRTEC) 10 mg tablet Take 10 mg by mouth.  Dexlansoprazole 60 mg CpDB Take 30 mg by mouth daily.  calcium carbonate (TUMS) 200 mg calcium (500 mg) chew Take 3 Tabs by mouth daily.  fentaNYL (DURAGESIC) 75 mcg/hr 1 Patch by TransDERmal route every seventy-two (72) hours.  gabapentin (NEURONTIN) 300 mg capsule 300 mg three (3) times daily. 1    VITAMIN D2 50,000 unit capsule 50,000 Units every Tuesday and Thursday. 0    traZODone (DESYREL) 50 mg tablet nightly. Purje 57 90 mcg/actuation inhaler Take 2 Puffs by inhalation two (2) times daily as needed. 3    NASONEX 50 mcg/actuation nasal spray 2 Sprays by Both Nostrils route daily. 3    montelukast (SINGULAIR) 10 mg tablet 10 mg nightly. 3    albuterol (PROVENTIL VENTOLIN) 2.5 mg /3 mL (0.083 %) nebulizer solution as needed. 3    cloNIDine HCl (CATAPRES) 0.1 mg tablet 0.1 mg three (3) times daily. 2    cyanocobalamin 1,000 mcg tablet Take 1,000 mcg by mouth daily.  FOLIC ACID/MULTIVIT-MIN/LUTEIN (CENTRUM SILVER PO) Take  by mouth daily.  enoxaparin (LOVENOX) 40 mg/0.4 mL 0.4 mL by SubCUTAneous route daily. Indications: DEEP VEIN THROMBOSIS PREVENTION 7 Syringe 0    aspirin delayed-release 325 mg tablet Take 1 Tab by mouth two (2) times a day. Start after Lovenox therapy is complete. 60 Tab 0    senna-docusate (PERICOLACE) 8.6-50 mg per tablet Take 1 Tab by mouth two (2) times a day. Indications: CONSTIPATION 60 Tab 0    DULoxetine (CYMBALTA) 60 mg capsule 60 mg two (2) times a day.  tamoxifen (NOLVADEX) 20 mg tablet 20 mg nightly. 11     No current facility-administered medications on file prior to visit. /83 (BP 1 Location: Left arm, BP Patient Position: Sitting)  Pulse 86  Temp 98.2 °F (36.8 °C) (Oral)   Resp 20  Ht 5' 5\" (1.651 m)  Wt 264 lb (119.7 kg)  SpO2 94%  BMI 43.93 kg/m2  ENMT: normocephalic, atraumatic   Respiratory: excursions normal and symmetrical  Cardiovascular: Regular rate and rhythm  Abdomen:  neg  Musculoskeletal: normal gait/station  Neuro: symmetrical  Psych: alert and oriented to person, place and time    Impression/plan  I have asked her to resume attempt at PPI treatment to control GERD sx which are very significant  Recurrent morbid obesity BMI 43  Will request esophageal manometry exam-  Patient lives in [facility]  Will request labs    More than 50% of this encounter was spent in direct counseling for this patient Counseling included topics such as the ongoing evaluation and treatment as well as options for future care. All questions have been answered in detail and the patient has expressed satisfaction regarding understanding of all this information. At least 25 minutes were spent in direct patient contact including more than 50% of the time spent directly counseling the patient as above during this encounter.

## 2018-01-17 ENCOUNTER — TELEPHONE (OUTPATIENT)
Dept: SURGERY | Age: 53
End: 2018-01-17

## 2018-01-17 ENCOUNTER — CLINICAL SUPPORT (OUTPATIENT)
Dept: SURGERY | Age: 53
End: 2018-01-17

## 2018-01-17 VITALS — WEIGHT: 252 LBS | BODY MASS INDEX: 41.93 KG/M2

## 2018-01-17 DIAGNOSIS — E66.01 MORBID OBESITY WITH BMI OF 40.0-44.9, ADULT (HCC): Primary | ICD-10-CM

## 2018-01-17 NOTE — PROGRESS NOTES
[id number] [facility] Surgery at [facility]  Supervised Weight Loss     Date:   2018    Patient's Name: Katharine Sebastian  : 1965    Insurance:  [employer]          Session: 6 of  6 (180 days completed)   Surgery: Gastric Bypass Revision  Surgeon:  Abhi Hernadez M.D. Height: 65\"   Weight:    252      Lbs. BMI: 41   Pounds Lost since last month: 6#               Pounds Gained since last month: 0    Starting Weight: 263#   Previous Months Weight: 258#  Overall Pounds Lost: 11#  Overall Pounds Gained: 0    Other Pertinent Information: none    Smoking Status:  none  Alcohol Intake: none    I have reviewed with patient the guidelines of the supervised weight loss class. Patient understands the expectations of some weight loss during the weight loss trial.  Patient understands that weight gain could delay the process. I have also expressed to patient that classes need to be consecutive. Missing a class may subject patient to have to start their trial over. Patient has received this information in writing. Changes that patient has made since last month include:  More walking, more housework, more activity d/t more appointments with ill mother. Eating Habits and Behaviors  Today we reviewed the general diet principles for weight loss surgery. Their plate should be made up of 1/2 coming from non-starchy vegetables, 1/4 coming from lean meat, and 1/4 of their plate coming from carbohydrates, including fruits, starches, or milk. Emphasis was placed on the importance of eating 3 meals a day and aiming for 60 grams of protein per day. I educated the patient on limiting liquid calories and drinking only calorie-free, sugar-free and non-carbonated beverages. We discussed the importance of drinking 64 ounces of fluid per day to prevent dehydration post-operatively.  A nutrition education lesson regarding vitamin and mineral supplements was provided and the importance of preventing nutrient deficiencies post-operatively. Patient's current diet habits include: eating 2-3 meals per day. Snacking on kale chips, crackers and cashew butter. Eating crackers 2-3 times per week. Denies intake of sweets and desserts. Eating baked, grilled and broiled foods. Eating out is once a week. Drinking 30 oz water, 12-24 oz diet soda and 12-24 oz Crystal Light. Denies emotional and situational eating. Packing meals when away from home. Eating most meals at a table or while in bed and takes 20 minutes to finish the meal. Reports overeating and being on steroids are biggest barriers to weight loss. Physical Activity/Exercise  During class we discussed the importance of increasing daily physical activity and beginning to develop an exercise regimen/routine. We discussed that exercise is an important part of long term weight loss. Comments:  During class, I discussed with patient the importance of getting into an exercise routine. Patient is currently not exercising d/t multiple health conditions that limit activity. Patient has been encouraged to consider pool exercise, chair exercises or short intervals of walking. Behavior Modification       Comments: We discussed the importance of eating mindfully after weight loss surgery to prevent food intolerance and prevent weight regain. We talked about how to eat more mindfully and identify emotional eating triggers. Tips and recommendations for how to make these changes were provided. Patient was encouraged to keep a food journal and record what they were taking in daily. Overall Assessment: Patient has demonstrates small lifestyle changes evidenced by reported changes and overall weight loss. Appears to understand the general nutrition guidelines and appears to be an appropriate candidate for surgery at this time. Patient-Set Goals:   1. Nutrition - continue to try new meals/dishes   2.  Exercise - continue to increase movement 3. Behavior -open minded    Tristan Gasca, RD  1/17/2018

## 2018-01-23 ENCOUNTER — HOSPITAL ENCOUNTER (OUTPATIENT)
Age: 53
Setting detail: OUTPATIENT SURGERY
Discharge: HOME | End: 2018-01-23
Attending: INTERNAL MEDICINE | Admitting: INTERNAL MEDICINE
Payer: COMMERCIAL

## 2018-01-23 VITALS
SYSTOLIC BLOOD PRESSURE: 139 MMHG | WEIGHT: 252 LBS | RESPIRATION RATE: 16 BRPM | DIASTOLIC BLOOD PRESSURE: 90 MMHG | HEIGHT: 65 IN | OXYGEN SATURATION: 98 % | HEART RATE: 60 BPM | BODY MASS INDEX: 41.99 KG/M2

## 2018-01-23 PROCEDURE — 74011000250 HC RX REV CODE- 250: Performed by: INTERNAL MEDICINE

## 2018-01-23 PROCEDURE — 76040000019: Performed by: INTERNAL MEDICINE

## 2018-01-23 RX ORDER — LIDOCAINE HYDROCHLORIDE 20 MG/ML
JELLY TOPICAL ONCE
Status: COMPLETED | OUTPATIENT
Start: 2018-01-23 | End: 2018-01-23

## 2018-01-23 RX ORDER — TOPIRAMATE 50 MG/1
50 TABLET, FILM COATED ORAL 2 TIMES DAILY
COMMUNITY
End: 2018-08-23

## 2018-01-23 RX ADMIN — LIDOCAINE HYDROCHLORIDE 5 MG: 20 JELLY TOPICAL at 11:54

## 2018-01-23 NOTE — DISCHARGE INSTRUCTIONS
Amari Castellanos  [id number]  1965      MANOMETRY DISCHARGE INSTRUCTION    You may resume your regular diet as tolerated. You may resume your normal daily activities. If you develop a sore throat- throat lozenges or warm salt water gargles will help. Call your Physician if you have any complications or questions. [employer] Activation    Thank you for requesting access to [employer]. Please follow the instructions below to securely access and download your online medical record. [employer] allows you to send messages to your doctor, view your test results, renew your prescriptions, schedule appointments, and more. How Do I Sign Up? 1. In your internet browser, go to [email or web address]  2. Click on the First Time User? Click Here link in the Sign In box. You will be redirect to the New Member Sign Up page. 3. Enter your [employer] Access Code exactly as it appears below. You will not need to use this code after youve completed the sign-up process. If you do not sign up before the expiration date, you must request a new code. [employer] Access Code: Activation code not generated  Current [employer] Status: Active (This is the date your [employer] access code will )    4. Enter the last four digits of your Social Security Number (xxxx) and Date of Birth (mm/dd/yyyy) as indicated and click Submit. You will be taken to the next sign-up page. 5. Create a [employer] ID. This will be your [employer] login ID and cannot be changed, so think of one that is secure and easy to remember. 6. Create a [employer] password. You can change your password at any time. 7. Enter your Password Reset Question and Answer. This can be used at a later time if you forget your password. 8. Enter your e-mail address. You will receive e-mail notification when new information is available in [address] 9. Click Sign Up. You can now view and download portions of your medical record.   10. Click the Download Summary menu link to download a portable copy of your medical information. Additional Information    If you have questions, please visit the Frequently Asked Questions section of the [employer] website at [email or web address] Remember, [employer] is NOT to be used for urgent needs. For medical emergencies, dial 911.

## 2018-01-23 NOTE — IP AVS SNAPSHOT
[address] 
[phone] Patient: Esteban Romero MRN: [id number] :1965 A check robert indicates which time of day the medication should be taken. My Medications ASK your doctor about these medications Instructions Each Dose to Equal  
 Morning Noon Evening Bedtime  
 acetaminophen 325 mg tablet Commonly known as:  TYLENOL Your last dose was: Your next dose is: Take 2 Tabs by mouth every six (6) hours. Indications: PAIN  
 650 mg  
    
   
   
   
  
 aspirin delayed-release 325 mg tablet Your last dose was: Your next dose is: Take 1 Tab by mouth two (2) times a day. Start after Lovenox therapy is complete. 325 mg  
    
   
   
   
  
 calcium carbonate 200 mg calcium (500 mg) Chew Commonly known as:  TUMS Your last dose was: Your next dose is: Take 3 Tabs by mouth daily. 3 Tab CENTRUM SILVER PO Your last dose was: Your next dose is: Take  by mouth daily. cetirizine 10 mg tablet Commonly known as:  ZYRTEC Your last dose was: Your next dose is: Take 10 mg by mouth. 10 mg  
    
   
   
   
  
 Citracal + D tablet Generic drug:  calcium citrate-vitamin D3 Your last dose was: Your next dose is: Take  by mouth two (2) times a day. cloNIDine HCl 0.1 mg tablet Commonly known as:  CATAPRES Your last dose was: Your next dose is:    
   
   
 0.1 mg three (3) times daily. 0.1 mg  
    
   
   
   
  
 cyanocobalamin 1,000 mcg tablet Your last dose was: Your next dose is: Take 1,000 mcg by mouth daily. 1000 mcg Dexlansoprazole 60 mg Cpdb Your last dose was: Your next dose is: Take 30 mg by mouth daily. 30 mg DULoxetine 60 mg capsule Commonly known as:  CYMBALTA Your last dose was: Your next dose is:    
   
   
 60 mg two (2) times a day. 60 mg  
    
   
   
   
  
 enoxaparin 40 mg/0.4 mL Commonly known as:  LOVENOX Your last dose was: Your next dose is: 0.4 mL by SubCUTAneous route daily. Indications: DEEP VEIN THROMBOSIS PREVENTION  
 40 mg  
    
   
   
   
  
 fentaNYL 75 mcg/hr Commonly known as:  Missy Angelucci Your last dose was: Your next dose is:    
   
   
 1 Patch by TransDERmal route every seventy-two (72) hours. 1 Patch  
    
   
   
   
  
 gabapentin 300 mg capsule Commonly known as:  NEURONTIN Your last dose was: Your next dose is:    
   
   
 300 mg three (3) times daily. 300 mg HYDROmorphone 4 mg tablet Commonly known as:  DILAUDID Your last dose was: Your next dose is: Take 1 Tab by mouth every four (4) hours as needed. Max Daily Amount: 24 mg. Indications: PAIN, Not to be filled. This prescription is for instructional purposes only. Patient has supply at home. 4 mg  
    
   
   
   
  
 mometasone-formoterol 200-5 mcg/actuation HFA inhaler Commonly known as:  Betsey Mayer Your last dose was: Your next dose is: Take 1 Puff by inhalation two (2) times a day. 1 Puff  
    
   
   
   
  
 montelukast 10 mg tablet Commonly known as:  SINGULAIR Your last dose was: Your next dose is:    
   
   
 10 mg nightly. 10 mg  
    
   
   
   
  
 NASONEX 50 mcg/actuation nasal spray Generic drug:  mometasone Your last dose was: Your next dose is: 2 Sprays by Both Nostrils route daily. 2 Spray * albuterol 2.5 mg /3 mL (0.083 %) nebulizer solution Commonly known as:  PROVENTIL VENTOLIN Your last dose was: Your next dose is:    
   
   
 as needed. * PROAIR HFA 90 mcg/actuation inhaler Generic drug:  albuterol Your last dose was: Your next dose is: Take 2 Puffs by inhalation two (2) times daily as needed. 2 Puff  
    
   
   
   
  
 senna-docusate 8.6-50 mg per tablet Commonly known as:  Joduke Boys Your last dose was: Your next dose is: Take 1 Tab by mouth two (2) times a day. Indications: CONSTIPATION  
 1 Tab  
    
   
   
   
  
 tamoxifen 20 mg tablet Commonly known as:  NOLVADEX Your last dose was: Your next dose is:    
   
   
 20 mg nightly. 20 mg  
    
   
   
   
  
 tiotropium 18 mcg inhalation capsule Commonly known as:  [facility] Anon Your last dose was: Your next dose is: Take 1 Cap by inhalation daily. 1 Cap  
    
   
   
   
  
 topiramate 50 mg tablet Commonly known as:  TOPAMAX Your last dose was: Your next dose is: Take 50 mg by mouth two (2) times a day. 50 mg  
    
   
   
   
  
 traZODone 50 mg tablet Commonly known as:  Deshaun Span Your last dose was: Your next dose is:    
   
   
 nightly. VITAMIN D2 50,000 unit capsule Generic drug:  ergocalciferol Your last dose was: Your next dose is:    
   
   
 50,000 Units every Tuesday and Thursday. [address] Units * Notice: This list has 2 medication(s) that are the same as other medications prescribed for you. Read the directions carefully, and ask your doctor or other care provider to review them with you.

## 2018-01-23 NOTE — IP AVS SNAPSHOT
[address] [facility] 
[phone] Patient: Rc Richter MRN: [id number] :1965 About your hospitalization You were admitted on:  2018 You last received care in the:  [facility] ENDOSCOPY You were discharged on:  2018 Why you were hospitalized Your primary diagnosis was:  Not on File Follow-up Information None Discharge Orders None A check robert indicates which time of day the medication should be taken. My Medications ASK your doctor about these medications Instructions Each Dose to Equal  
 Morning Noon Evening Bedtime  
 acetaminophen 325 mg tablet Commonly known as:  TYLENOL Your last dose was: Your next dose is: Take 2 Tabs by mouth every six (6) hours. Indications: PAIN  
 650 mg  
    
   
   
   
  
 aspirin delayed-release 325 mg tablet Your last dose was: Your next dose is: Take 1 Tab by mouth two (2) times a day. Start after Lovenox therapy is complete. 325 mg  
    
   
   
   
  
 calcium carbonate 200 mg calcium (500 mg) Chew Commonly known as:  TUMS Your last dose was: Your next dose is: Take 3 Tabs by mouth daily. 3 Tab CENTRUM SILVER PO Your last dose was: Your next dose is: Take  by mouth daily. cetirizine 10 mg tablet Commonly known as:  ZYRTEC Your last dose was: Your next dose is: Take 10 mg by mouth. 10 mg  
    
   
   
   
  
 Citracal + D tablet Generic drug:  calcium citrate-vitamin D3 Your last dose was: Your next dose is: Take  by mouth two (2) times a day. cloNIDine HCl 0.1 mg tablet Commonly known as:  CATAPRES Your last dose was: Your next dose is: 0.1 mg three (3) times daily. 0.1 mg  
    
   
   
   
  
 cyanocobalamin 1,000 mcg tablet Your last dose was: Your next dose is: Take 1,000 mcg by mouth daily. 1000 mcg Dexlansoprazole 60 mg Cpdb Your last dose was: Your next dose is: Take 30 mg by mouth daily. 30 mg DULoxetine 60 mg capsule Commonly known as:  CYMBALTA Your last dose was: Your next dose is:    
   
   
 60 mg two (2) times a day. 60 mg  
    
   
   
   
  
 enoxaparin 40 mg/0.4 mL Commonly known as:  LOVENOX Your last dose was: Your next dose is: 0.4 mL by SubCUTAneous route daily. Indications: DEEP VEIN THROMBOSIS PREVENTION  
 40 mg  
    
   
   
   
  
 fentaNYL 75 mcg/hr Commonly known as:  Car Sox Your last dose was: Your next dose is:    
   
   
 1 Patch by TransDERmal route every seventy-two (72) hours. 1 Patch  
    
   
   
   
  
 gabapentin 300 mg capsule Commonly known as:  NEURONTIN Your last dose was: Your next dose is:    
   
   
 300 mg three (3) times daily. 300 mg HYDROmorphone 4 mg tablet Commonly known as:  DILAUDID Your last dose was: Your next dose is: Take 1 Tab by mouth every four (4) hours as needed. Max Daily Amount: 24 mg. Indications: PAIN, Not to be filled. This prescription is for instructional purposes only. Patient has supply at home. 4 mg  
    
   
   
   
  
 mometasone-formoterol 200-5 mcg/actuation HFA inhaler Commonly known as:  Lei Sine Your last dose was: Your next dose is: Take 1 Puff by inhalation two (2) times a day. 1 Puff  
    
   
   
   
  
 montelukast 10 mg tablet Commonly known as:  SINGULAIR Your last dose was: Your next dose is:    
   
   
 10 mg nightly.   
 10 mg  
    
   
   
   
  
 NASONEX 50 mcg/actuation nasal spray Generic drug:  mometasone Your last dose was: Your next dose is: 2 Sprays by Both Nostrils route daily. 2 Spray * albuterol 2.5 mg /3 mL (0.083 %) nebulizer solution Commonly known as:  PROVENTIL VENTOLIN Your last dose was: Your next dose is:    
   
   
 as needed. * PROAIR HFA 90 mcg/actuation inhaler Generic drug:  albuterol Your last dose was: Your next dose is: Take 2 Puffs by inhalation two (2) times daily as needed. 2 Puff  
    
   
   
   
  
 senna-docusate 8.6-50 mg per tablet Commonly known as:  Leia Ba Your last dose was: Your next dose is: Take 1 Tab by mouth two (2) times a day. Indications: CONSTIPATION  
 1 Tab  
    
   
   
   
  
 tamoxifen 20 mg tablet Commonly known as:  NOLVADEX Your last dose was: Your next dose is:    
   
   
 20 mg nightly. 20 mg  
    
   
   
   
  
 tiotropium 18 mcg inhalation capsule Commonly known as:  Waddell Brew Your last dose was: Your next dose is: Take 1 Cap by inhalation daily. 1 Cap  
    
   
   
   
  
 topiramate 50 mg tablet Commonly known as:  TOPAMAX Your last dose was: Your next dose is: Take 50 mg by mouth two (2) times a day. 50 mg  
    
   
   
   
  
 traZODone 50 mg tablet Commonly known as:  Christian Tami Your last dose was: Your next dose is:    
   
   
 nightly. VITAMIN D2 50,000 unit capsule Generic drug:  ergocalciferol Your last dose was: Your next dose is:    
   
   
 50,000 Units every Tuesday and Thursday. 28036 Units * Notice: This list has 2 medication(s) that are the same as other medications prescribed for you.  Read the directions carefully, and ask your doctor or other care provider to review them with you. Discharge Instructions Torri Cerrato [id number] 
1965 MANOMETRY DISCHARGE INSTRUCTION You may resume your regular diet as tolerated. You may resume your normal daily activities. If you develop a sore throat- throat lozenges or warm salt water gargles will help. Call your Physician if you have any complications or questions. [employer] Activation Thank you for requesting access to [employer]. Please follow the instructions below to securely access and download your online medical record. [employer] allows you to send messages to your doctor, view your test results, renew your prescriptions, schedule appointments, and more. How Do I Sign Up? 1. In your internet browser, go to [email or web address] 
2. Click on the First Time User? Click Here link in the Sign In box. You will be redirect to the New Member Sign Up page. 3. Enter your [employer] Access Code exactly as it appears below. You will not need to use this code after youve completed the sign-up process. If you do not sign up before the expiration date, you must request a new code. [employer] Access Code: Activation code not generated Current [employer] Status: Active (This is the date your [employer] access code will ) 4. Enter the last four digits of your Social Security Number (xxxx) and Date of Birth (mm/dd/yyyy) as indicated and click Submit. You will be taken to the next sign-up page. 5. Create a [employer] ID. This will be your [employer] login ID and cannot be changed, so think of one that is secure and easy to remember. 6. Create a [employer] password. You can change your password at any time. 7. Enter your Password Reset Question and Answer. This can be used at a later time if you forget your password. 8. Enter your e-mail address. You will receive e-mail notification when new information is available in [address] 9. Click Sign Up. You can now view and download portions of your medical record. 10. Click the Download Summary menu link to download a portable copy of your medical information. Additional Information If you have questions, please visit the Frequently Asked Questions section of the [employer] website at [email or web address] Remember, MyChart is NOT to be used for urgent needs. For medical emergencies, dial 911. Introducing [facility]! Dear Ottoniel: Thank you for requesting a [employer] account. Our records indicate that you already have an active [employer] account. You can access your account anytime at [email or web address] Did you know that you can access your hospital and ER discharge instructions at any time in [employer]? You can also review all of your test results from your hospital stay or ER visit. Additional Information If you have questions, please visit the Frequently Asked Questions section of the [employer] website at [email or web address] Remember, MyChart is NOT to be used for urgent needs. For medical emergencies, dial 911. Now available from your iPhone and Android! Providers Seen During Your Hospitalization Provider Specialty Primary office phone Jay Jay Emmanuel MD Gastroenterology [phone] Your Primary Care Physician (PCP) Primary Care Physician Office Phone Office Fax [place], [address] [facility] [phone] You are allergic to the following Allergen Reactions Levaquin (Levofloxacin) Anaphylaxis Bactrim (Sulfamethoprim) Itching Morphine Other (comments) Recent Documentation Height Weight Breastfeeding? BMI OB Status Smoking Status 1.651 m 114.3 kg No 41.93 kg/m2 Hysterectomy Former Smoker Emergency Contacts Name Discharge Info Relation Home Work Mobile  Tawny,Pent[employer]al DISCHARGE CAREGIVER [3] Spouse [3] [phone] [place] [address] CAREGIVER [3] Other Relative [6] [phone] Patient Belongings The following personal items are in your possession at time of discharge: 
  Dental Appliances: None  Visual Aid: Glasses Please provide this summary of care documentation to your next provider. Signatures-by signing, you are acknowledging that this After Visit Summary has been reviewed with you and you have received a copy. Patient Signature:  ____________________________________________________________ Date:  ____________________________________________________________  
  
[facility] Provider Signature:  ____________________________________________________________ Date:  ____________________________________________________________

## 2018-01-28 NOTE — PROCEDURES
[address]  [address]          Esophageal Manometry Procedure    Gorge Oliveros  1965  [id number]      Procedure: Esophageal manometry    Indication: Dysphagia, gastric bypass history     Pre-operative Diagnosis: see indication above    Post-operative Diagnosis: see findings below    : Cain Patel MD    Referring Provider:  Sara Khan MD; Saskia Navarro MD; Lue Krabbe, MD    Procedure Details     It was done at the endoscopy lab, the manometry catheter was advanced to the stomach then pressure measurements were obtained. I reviewed all the manometry results. Findings: (PLEASE REFER TO THE SCANNED REPORT FOR MORE DETAILS)                                 Lower esophageal sphincter study    LESP =  69.2 mmHg  incomplete relaxation                                Lower esophageal body study        Peristaltic contractions=  100%  Amplitudes  111  mmHg         Upper Esophageal body study      UESP=  0.5 mmHg                                     Complications:   None; patient tolerated the procedure well.            Impression:    EGJ outflow obstruction     Recommendations:  EUS with Dr. Ciaran Archer    Signed By: Cain Patel MD     1/28/2018  9:36 AM

## 2018-02-13 ENCOUNTER — TELEPHONE (OUTPATIENT)
Dept: SURGERY | Age: 53
End: 2018-02-13

## 2018-02-20 ENCOUNTER — HOSPITAL ENCOUNTER (OUTPATIENT)
Age: 53
Setting detail: OUTPATIENT SURGERY
Discharge: HOME | End: 2018-02-20
Attending: INTERNAL MEDICINE | Admitting: INTERNAL MEDICINE
Payer: COMMERCIAL

## 2018-02-20 ENCOUNTER — ANESTHESIA (OUTPATIENT)
Dept: ENDOSCOPY | Age: 53
End: 2018-02-20
Payer: COMMERCIAL

## 2018-02-20 ENCOUNTER — ANESTHESIA EVENT (OUTPATIENT)
Dept: ENDOSCOPY | Age: 53
End: 2018-02-20
Payer: COMMERCIAL

## 2018-02-20 VITALS
BODY MASS INDEX: 41.99 KG/M2 | TEMPERATURE: 98 F | HEIGHT: 65 IN | WEIGHT: 252 LBS | RESPIRATION RATE: 15 BRPM | OXYGEN SATURATION: 100 % | DIASTOLIC BLOOD PRESSURE: 60 MMHG | SYSTOLIC BLOOD PRESSURE: 152 MMHG | HEART RATE: 72 BPM

## 2018-02-20 PROCEDURE — 74011000258 HC RX REV CODE- 258

## 2018-02-20 PROCEDURE — 74011250636 HC RX REV CODE- 250/636

## 2018-02-20 PROCEDURE — 76040000019: Performed by: INTERNAL MEDICINE

## 2018-02-20 PROCEDURE — 76060000031 HC ANESTHESIA FIRST 0.5 HR: Performed by: INTERNAL MEDICINE

## 2018-02-20 RX ORDER — DIPHENHYDRAMINE HYDROCHLORIDE 50 MG/ML
50 INJECTION, SOLUTION INTRAMUSCULAR; INTRAVENOUS ONCE
Status: DISCONTINUED | OUTPATIENT
Start: 2018-02-20 | End: 2018-02-20 | Stop reason: HOSPADM

## 2018-02-20 RX ORDER — MIDAZOLAM HYDROCHLORIDE 1 MG/ML
.25-1 INJECTION, SOLUTION INTRAMUSCULAR; INTRAVENOUS
Status: DISCONTINUED | OUTPATIENT
Start: 2018-02-20 | End: 2018-02-20 | Stop reason: HOSPADM

## 2018-02-20 RX ORDER — ATROPINE SULFATE 0.1 MG/ML
0.5 INJECTION INTRAVENOUS
Status: DISCONTINUED | OUTPATIENT
Start: 2018-02-20 | End: 2018-02-20 | Stop reason: HOSPADM

## 2018-02-20 RX ORDER — NALOXONE HYDROCHLORIDE 0.4 MG/ML
0.4 INJECTION, SOLUTION INTRAMUSCULAR; INTRAVENOUS; SUBCUTANEOUS
Status: DISCONTINUED | OUTPATIENT
Start: 2018-02-20 | End: 2018-02-20 | Stop reason: HOSPADM

## 2018-02-20 RX ORDER — SODIUM CHLORIDE 0.9 % (FLUSH) 0.9 %
5-10 SYRINGE (ML) INJECTION AS NEEDED
Status: DISCONTINUED | OUTPATIENT
Start: 2018-02-20 | End: 2018-02-20 | Stop reason: HOSPADM

## 2018-02-20 RX ORDER — SODIUM CHLORIDE 0.9 % (FLUSH) 0.9 %
5-10 SYRINGE (ML) INJECTION EVERY 8 HOURS
Status: DISCONTINUED | OUTPATIENT
Start: 2018-02-20 | End: 2018-02-20 | Stop reason: HOSPADM

## 2018-02-20 RX ORDER — SODIUM CHLORIDE 9 MG/ML
100 INJECTION, SOLUTION INTRAVENOUS CONTINUOUS
Status: DISCONTINUED | OUTPATIENT
Start: 2018-02-20 | End: 2018-02-20 | Stop reason: HOSPADM

## 2018-02-20 RX ORDER — FLUMAZENIL 0.1 MG/ML
0.2 INJECTION INTRAVENOUS
Status: DISCONTINUED | OUTPATIENT
Start: 2018-02-20 | End: 2018-02-20 | Stop reason: HOSPADM

## 2018-02-20 RX ORDER — FENTANYL CITRATE 50 UG/ML
100 INJECTION, SOLUTION INTRAMUSCULAR; INTRAVENOUS
Status: DISCONTINUED | OUTPATIENT
Start: 2018-02-20 | End: 2018-02-20 | Stop reason: HOSPADM

## 2018-02-20 RX ORDER — SODIUM CHLORIDE 9 MG/ML
INJECTION, SOLUTION INTRAVENOUS
Status: DISCONTINUED | OUTPATIENT
Start: 2018-02-20 | End: 2018-02-20 | Stop reason: HOSPADM

## 2018-02-20 RX ORDER — DEXTROMETHORPHAN/PSEUDOEPHED 2.5-7.5/.8
1.2 DROPS ORAL
Status: DISCONTINUED | OUTPATIENT
Start: 2018-02-20 | End: 2018-02-20 | Stop reason: HOSPADM

## 2018-02-20 RX ORDER — EPINEPHRINE 0.1 MG/ML
1 INJECTION INTRACARDIAC; INTRAVENOUS
Status: DISCONTINUED | OUTPATIENT
Start: 2018-02-20 | End: 2018-02-20 | Stop reason: HOSPADM

## 2018-02-20 RX ORDER — PROPOFOL 10 MG/ML
INJECTION, EMULSION INTRAVENOUS AS NEEDED
Status: DISCONTINUED | OUTPATIENT
Start: 2018-02-20 | End: 2018-02-20 | Stop reason: HOSPADM

## 2018-02-20 RX ADMIN — PROPOFOL 50 MG: 10 INJECTION, EMULSION INTRAVENOUS at 15:18

## 2018-02-20 RX ADMIN — PROPOFOL 150 MG: 10 INJECTION, EMULSION INTRAVENOUS at 15:13

## 2018-02-20 RX ADMIN — PROPOFOL 50 MG: 10 INJECTION, EMULSION INTRAVENOUS at 15:20

## 2018-02-20 RX ADMIN — SODIUM CHLORIDE: 9 INJECTION, SOLUTION INTRAVENOUS at 15:02

## 2018-02-20 NOTE — DISCHARGE INSTRUCTIONS
[address]  174 [facility], [address] [place] [address]    Endoscopic ultrasound DISCHARGE INSTRUCTIONS    Barbara Nesbitt  [id number]  1965    Discomfort:  Sore throat- throat lozenges or warm salt water gargle  redness at IV site- apply warm compress to area; if redness or soreness persist- contact your physician  Gaseous discomfort- walking, belching will help relieve any discomfort  You may not operate a vehicle for 12 hours  You may not engage in an occupation involving machinery or appliances for rest of today  You may not drink alcoholic beverages for at least 12 hours  Avoid making any critical decisions for at least 24 hour  DIET  You may eat and drink now and after you leave. You may resume your regular diet - however -  remember your colon is empty and a heavy meal will produce gas. Avoid these foods:  vegetables, fried / greasy foods, carbonated drinks    ACTIVITY  You may resume your normal daily activities   Spend the remainder of the day resting -  avoid any strenuous activity. CALL M.D. ANY SIGN OF   Increasing pain, nausea, vomiting  Abdominal distension (swelling)  New increased bleeding (oral or rectal)  Fever (chills)  Pain in chest area  Bloody discharge from nose or mouth  Shortness of breath    Follow-up Instructions:   Call Dr. Caleb Wilson for any questions or problems. Telephone # [phone]    ENDOSCOPY FINDINGS:   Your endoscopic ultrasound showed a normal appearing gastroesophageal junction on endoscopic ultrasound. Please follow up with Dr. Racquel Hurd. Signed By: Lakisha Zhou.  Merrick Hayes MD     2/20/2018  3:27 PM

## 2018-02-20 NOTE — PERIOP NOTES

## 2018-02-20 NOTE — PROCEDURES
[address], [address] [place] [address]         Endoscopic Ultrasound    NAME:  Harris Tay   :   1965   MRN:   [id number]       Procedure Type: Endoscopic Ultrasound    Indications: esophago-gastric junction outflow obstruction, history of gastric bypass    Pre-operative Diagnosis: see indication above    Post-operative Diagnosis:  See findings below    : Adriana Iniguez. Mary Garvey MD    Referring Provider: Jovi Hunt MD, -Silvestre Bryan MD    Anethesia/Sedation:  MAC anesthesia Propofol      Procedure Details     After informed consent was obtained for the procedure, with all risks and benefits of procedure explained the patient was taken to the endoscopy suite and placed in the left lateral decubitus position. Following sequential administration of sedation as per above, an EGD was performed. Findings are listed below. Next, the radial echoendoscope was inserted into the mouth and advanced under direct vision to the gastric pouch. Findings:     Endoscopic:   A normal Z line was seen at 40 cm. The gastric pouch was 4 cm in length was spherical in shape. Retroflexion was performed within the pouch and was normal appearing. A widely patent GJ anastomosis was located at 44 cm. The retroflexed estimated diameter of the GJ anastomosis was 18-20 mm. No marginal ulceration was seen. There was suture material visible on the jejunal side of the anastomosis. The blind end of the jejunal side of the anastomosis was located at 50 cm. The scope was advanced to the distal Amrik limb. No bile reflux was seen. The JJ anastomosis was not reached. Ultrasound:   Esophagus: the esophageal wall layers were normal appearing. No mass or infiltrating lesion was appreciated. Specimen Removed:  none    Complications: None. EBL:  None. Interventions: see above    Impression:  1. EGJ outflow obstruction  2. Normal appearing esophageal wall layers  3.  History of gastric bypass    Recommendations:   1. Follow up with Dr. Khalida Lauren By: [place].  Leroy Armendariz MD     2/20/2018  3:30 PM

## 2018-02-20 NOTE — IP AVS SNAPSHOT
[address] [place] [address] 
[phone] Patient: Gloria Joaquin MRN: [id number] :1965 About your hospitalization You were admitted on:  2018 You last received care in the:  [facility] ENDOSCOPY You were discharged on:  2018 Why you were hospitalized Your primary diagnosis was:  Not on File Follow-up Information None Discharge Orders None A check robert indicates which time of day the medication should be taken. My Medications CONTINUE taking these medications Instructions Each Dose to Equal  
 Morning Noon Evening Bedtime  
 acetaminophen 325 mg tablet Commonly known as:  TYLENOL Your last dose was: Your next dose is: Take 2 Tabs by mouth every six (6) hours. Indications: PAIN  
 650 mg  
    
   
   
   
  
 aspirin delayed-release 325 mg tablet Your last dose was: Your next dose is: Take 1 Tab by mouth two (2) times a day. Start after Lovenox therapy is complete. 325 mg  
    
   
   
   
  
 calcium carbonate 200 mg calcium (500 mg) Chew Commonly known as:  TUMS Your last dose was: Your next dose is: Take 3 Tabs by mouth daily. 3 Tab CENTRUM SILVER PO Your last dose was: Your next dose is: Take  by mouth daily. cetirizine 10 mg tablet Commonly known as:  ZYRTEC Your last dose was: Your next dose is: Take 10 mg by mouth. 10 mg  
    
   
   
   
  
 Citracal + D tablet Generic drug:  calcium citrate-vitamin D3 Your last dose was: Your next dose is: Take  by mouth two (2) times a day. cloNIDine HCl 0.1 mg tablet Commonly known as:  CATAPRES Your last dose was: Your next dose is: 0.1 mg three (3) times daily. 0.1 mg  
    
   
   
   
  
 cyanocobalamin 1,000 mcg tablet Your last dose was: Your next dose is: Take 1,000 mcg by mouth daily. 1000 mcg Dexlansoprazole 60 mg Cpdb Your last dose was: Your next dose is: Take 30 mg by mouth daily. 30 mg DULoxetine 60 mg capsule Commonly known as:  CYMBALTA Your last dose was: Your next dose is:    
   
   
 60 mg two (2) times a day. 60 mg  
    
   
   
   
  
 enoxaparin 40 mg/0.4 mL Commonly known as:  LOVENOX Your last dose was: Your next dose is: 0.4 mL by SubCUTAneous route daily. Indications: DEEP VEIN THROMBOSIS PREVENTION  
 40 mg  
    
   
   
   
  
 fentaNYL 75 mcg/hr Commonly known as:  Monica Brome Your last dose was: Your next dose is:    
   
   
 1 Patch by TransDERmal route every seventy-two (72) hours. 1 Patch  
    
   
   
   
  
 gabapentin 300 mg capsule Commonly known as:  NEURONTIN Your last dose was: Your next dose is:    
   
   
 300 mg three (3) times daily. 300 mg HYDROmorphone 4 mg tablet Commonly known as:  DILAUDID Your last dose was: Your next dose is: Take 1 Tab by mouth every four (4) hours as needed. Max Daily Amount: 24 mg. Indications: PAIN, Not to be filled. This prescription is for instructional purposes only. Patient has supply at home. 4 mg  
    
   
   
   
  
 mometasone-formoterol 200-5 mcg/actuation HFA inhaler Commonly known as:  Oren Tran Your last dose was: Your next dose is: Take 1 Puff by inhalation two (2) times a day. 1 Puff  
    
   
   
   
  
 montelukast 10 mg tablet Commonly known as:  SINGULAIR Your last dose was: Your next dose is:    
   
   
 10 mg nightly.   
 10 mg  
    
   
   
   
  
 NASONEX 50 mcg/actuation nasal spray Generic drug:  mometasone Your last dose was: Your next dose is: 2 Sprays by Both Nostrils route daily. 2 Spray * albuterol 2.5 mg /3 mL (0.083 %) nebulizer solution Commonly known as:  PROVENTIL VENTOLIN Your last dose was: Your next dose is:    
   
   
 as needed. * PROAIR HFA 90 mcg/actuation inhaler Generic drug:  albuterol Your last dose was: Your next dose is: Take 2 Puffs by inhalation two (2) times daily as needed. 2 Puff  
    
   
   
   
  
 senna-docusate 8.6-50 mg per tablet Commonly known as:  John Limb Your last dose was: Your next dose is: Take 1 Tab by mouth two (2) times a day. Indications: CONSTIPATION  
 1 Tab  
    
   
   
   
  
 tamoxifen 20 mg tablet Commonly known as:  NOLVADEX Your last dose was: Your next dose is:    
   
   
 20 mg nightly. 20 mg  
    
   
   
   
  
 tiotropium 18 mcg inhalation capsule Commonly known as:  Kath Shinshantal Your last dose was: Your next dose is: Take 1 Cap by inhalation daily. 1 Cap  
    
   
   
   
  
 topiramate 50 mg tablet Commonly known as:  TOPAMAX Your last dose was: Your next dose is: Take 50 mg by mouth two (2) times a day. 50 mg  
    
   
   
   
  
 traZODone 50 mg tablet Commonly known as:  Stevphen Hagagandeeper Your last dose was: Your next dose is:    
   
   
 nightly. VITAMIN D2 50,000 unit capsule Generic drug:  ergocalciferol Your last dose was: Your next dose is:    
   
   
 50,000 Units every Tuesday and Thursday. [address]683 Units * Notice: This list has 2 medication(s) that are the same as other medications prescribed for you.  Read the directions carefully, and ask your doctor or other care provider to review them with you. Discharge Instructions [address] 
[address] [facility], [address]  Endoscopic ultrasound DISCHARGE INSTRUCTIONS Harris Tay [id number] 
1965 Discomfort: 
Sore throat- throat lozenges or warm salt water gargle 
redness at IV site- apply warm compress to area; if redness or soreness persist- contact your physician Gaseous discomfort- walking, belching will help relieve any discomfort You may not operate a vehicle for 12 hours You may not engage in an occupation involving machinery or appliances for rest of today You may not drink alcoholic beverages for at least 12 hours Avoid making any critical decisions for at least 24 hour DIET You may eat and drink now and after you leave. You may resume your regular diet  however -  remember your colon is empty and a heavy meal will produce gas. Avoid these foods:  vegetables, fried / greasy foods, carbonated drinks ACTIVITY You may resume your normal daily activities Spend the remainder of the day resting -  avoid any strenuous activity. CALL M.D. ANY SIGN OF Increasing pain, nausea, vomiting Abdominal distension (swelling) New increased bleeding (oral or rectal) Fever (chills) Pain in chest area Bloody discharge from nose or mouth Shortness of breath Follow-up Instructions: 
 Call Dr. Sana Hastings for any questions or problems. Telephone # [phone] ENDOSCOPY FINDINGS: 
 Your endoscopic ultrasound showed a normal appearing gastroesophageal junction on endoscopic ultrasound. Please follow up with Dr. Jarod Chavez. Signed By: Adriana Garvey MD   
 2/20/2018  3:27 PM 
  
 
 
 
 
  
  
  
Introducing [facility]! Dear Cherylene Carwin: Thank you for requesting a [employer] account. Our records indicate that you already have an active [employer] account.   You can access your account anytime at [email or web address] Did you know that you can access your hospital and ER discharge instructions at any time in [employer]? You can also review all of your test results from your hospital stay or ER visit. Additional Information If you have questions, please visit the Frequently Asked Questions section of the [employer] website at [email or web address] Remember, [employer] is NOT to be used for urgent needs. For medical emergencies, dial 911. Now available from your iPhone and Android! Providers Seen During Your Hospitalization Provider Specialty Primary office phone Pavel De La Rosa MD Gastroenterology [phone] Your Primary Care Physician (PCP) Primary Care Physician Office Phone Office Fax [place], 800 [facility] [phone] You are allergic to the following Allergen Reactions Levaquin (Levofloxacin) Anaphylaxis Bactrim (Sulfamethoprim) Itching Morphine Other (comments) Recent Documentation Height Weight Breastfeeding? BMI OB Status Smoking Status 1.651 m 114.3 kg No 41.93 kg/m2 Hysterectomy Former Smoker Emergency Contacts Name Discharge Info Relation Home Work Mobile [address] CAREGIVER [3] Spouse [3] 35 66 [address] CAREGIVER [3] Other Relative [6] [phone] Patient Belongings The following personal items are in your possession at time of discharge: 
  Dental Appliances: None  Visual Aid: None Please provide this summary of care documentation to your next provider. Signatures-by signing, you are acknowledging that this After Visit Summary has been reviewed with you and you have received a copy. Patient Signature:  ____________________________________________________________ Date:  ____________________________________________________________  
  
Giovanni Phenes Provider Signature:  ____________________________________________________________ Date:  ____________________________________________________________

## 2018-02-20 NOTE — ROUTINE PROCESS
Francine Hank  1965  [id number]    Situation:  Verbal report received from: Frankie Hoffman RN  Procedure: Procedure(s):  ESOPHAGOGASTRODUODENOSCOPY (EGD), RADIAL EUS  ENDOSCOPIC ULTRASOUND (EUS)    Background:    Preoperative diagnosis: ESOPHAGEAL GASTRIC JUNCTION OUTFLOW OBSTRUCTION  Postoperative diagnosis: 1. Hx of Gastric Bypass  2. EGJ Outflow Obstruction    :  Dr. Joann Welsh  Assistant(s): Endoscopy Technician-1: Will Dow IV  Endoscopy RN-1: Noemí Rasmussen    Specimens: * No specimens in log *  H. Pylori  no    Assessment:  Intra-procedure medications   Anesthesia gave intra-procedure sedation and medications, see anesthesia flow sheet yes    Intravenous fluids: NS@ KVO     Vital signs stable     Abdominal assessment: round and soft     Recommendation:  Discharge patient per MD order.   Family   Permission to share finding with family or friend yes

## 2018-02-20 NOTE — ANESTHESIA PREPROCEDURE EVALUATION
Anesthetic History     PONV          Review of Systems / Medical History  Patient summary reviewed, nursing notes reviewed and pertinent labs reviewed    Pulmonary        Sleep apnea: BiPAP    Asthma : well controlled       Neuro/Psych         Headaches     Cardiovascular                  Exercise tolerance: >4 METS  Comments: Pulmonary Hypertension   GI/Hepatic/Renal     GERD           Endo/Other        Morbid obesity and arthritis     Other Findings              Physical Exam    Airway  Mallampati: II  TM Distance: 4 - 6 cm  Neck ROM: normal range of motion   Mouth opening: Normal     Cardiovascular  Regular rate and rhythm,  S1 and S2 normal,  no murmur, click, rub, or gallop             Dental    Dentition: Poor dentition     Pulmonary  Breath sounds clear to auscultation               Abdominal  GI exam deferred       Other Findings            Anesthetic Plan    ASA: 3  Anesthesia type: MAC          Induction: Intravenous  Anesthetic plan and risks discussed with: Patient

## 2018-02-20 NOTE — H&P
[address]  Christiano Bazan, [address]      History and Physical       NAME:  Katharine Sebastian   :   1965   MRN:   [id number]             History of Present Illness:  Patient is a 47 y.o. who is seen for esophago-gastric junction outflow obstruction. PMH:  Past Medical History:   Diagnosis Date    Arthritis     Asthma     Autoimmune disease ([facility] [address]     ADULT MD    Cancer ([facility] [address] 2012    LEFT BREAST    Chronic pain     Fall     Headache     Ill-defined condition 2015    PULMONARY HYPERTENSION    Nausea & vomiting     Sarcoidosis     Sleep apnea     USES BIPAP       PSH:  Past Surgical History:   Procedure Laterality Date    CHEST SURGERY PROCEDURE UNLISTED      BIOPSY OUTER LUNG    HX BREAST RECONSTRUCTION      HX BREAST RECONSTRUCTION       3 SURGERIES TO REMOVE DEAD TISSUE    HX BREAST RECONSTRUCTION  2013    LATISAMUS FLAP, IMPLANTS FAT GRAFTING     HX CERVICAL FUSION      C5-C6 hardware    HX CHOLECYSTECTOMY  1998    HX COLPOSCOPY      HX GASTRIC BYPASS  2003    HX GYN  2001    CERVIX CONE PROCEDURE    HX GYN  2008    EUTERO ABLATION    HX HEENT      HX HYSTERECTOMY  2014    HX HYSTEROSCOPY WITH ENDOMETRIAL ABLATION  2008    HX LUMBAR FUSION  2011    L5-S1 hardware    HX MASTECTOMY Bilateral     HX ORTHOPAEDIC  2010    BONE GRAFT SCAFOID BONE WRIST    HX OTHER SURGICAL  2013    PAIN STIMULATOR    HX OTHER SURGICAL  2015    MUSCLE BIOPSY     HX TONSILLECTOMY  1982       Allergies: Allergies   Allergen Reactions    Levaquin [Levofloxacin] Anaphylaxis    Bactrim [Sulfamethoprim] Itching    Morphine Other (comments)       Home Medications:  Prior to Admission Medications   Prescriptions Last Dose Informant Patient Reported? Taking? DULoxetine (CYMBALTA) 60 mg capsule   Yes No   Si mg two (2) times a day. Dexlansoprazole 60 mg CpDB   Yes No   Sig: Take 30 mg by mouth daily.    FOLIC ACID/MULTIVIT-MIN/LUTEIN (CENTRUM SILVER PO)   Yes No   Sig: Take  by mouth daily. HYDROmorphone (DILAUDID) 4 mg tablet 2018 at Unknown time  No Yes   Sig: Take 1 Tab by mouth every four (4) hours as needed. Max Daily Amount: 24 mg. Indications: PAIN, Not to be filled. This prescription is for instructional purposes only. Patient has supply at home. NASONEX 50 mcg/actuation nasal spray   Yes No   Si Sprays by Both Nostrils route daily. PROAIR HFA 90 mcg/actuation inhaler   Yes No   Sig: Take 2 Puffs by inhalation two (2) times daily as needed. VITAMIN D2 50,000 unit capsule   Yes No   Si,000 Units every Tuesday and Thursday. acetaminophen (TYLENOL) 325 mg tablet   No No   Sig: Take 2 Tabs by mouth every six (6) hours. Indications: PAIN   albuterol (PROVENTIL VENTOLIN) 2.5 mg /3 mL (0.083 %) nebulizer solution   Yes No   Sig: as needed. aspirin delayed-release 325 mg tablet   No No   Sig: Take 1 Tab by mouth two (2) times a day. Start after Lovenox therapy is complete. calcium carbonate (TUMS) 200 mg calcium (500 mg) chew   Yes No   Sig: Take 3 Tabs by mouth daily. calcium citrate-vitamin D3 (CITRACAL + D) tablet   Yes No   Sig: Take  by mouth two (2) times a day. cetirizine (ZYRTEC) 10 mg tablet   Yes No   Sig: Take 10 mg by mouth.   cloNIDine HCl (CATAPRES) 0.1 mg tablet 2018 at Unknown time  Yes Yes   Si.1 mg three (3) times daily. cyanocobalamin 1,000 mcg tablet   Yes No   Sig: Take 1,000 mcg by mouth daily. enoxaparin (LOVENOX) 40 mg/0.4 mL   No No   Si.4 mL by SubCUTAneous route daily. Indications: DEEP VEIN THROMBOSIS PREVENTION   fentaNYL (DURAGESIC) 75 mcg/hr   Yes No   Si Patch by TransDERmal route every seventy-two (72) hours. gabapentin (NEURONTIN) 300 mg capsule 2018 at Unknown time  Yes Yes   Si mg three (3) times daily. mometasone-formoterol (DULERA) 200-5 mcg/actuation HFA inhaler   Yes No   Sig: Take 1 Puff by inhalation two (2) times a day.    montelukast (SINGULAIR) 10 mg tablet   Yes No   Sig: 10 mg nightly. senna-docusate (PERICOLACE) 8.6-50 mg per tablet   No No   Sig: Take 1 Tab by mouth two (2) times a day. Indications: CONSTIPATION   tamoxifen (NOLVADEX) 20 mg tablet   Yes No   Si mg nightly. tiotropium (SPIRIVA) 18 mcg inhalation capsule   Yes No   Sig: Take 1 Cap by inhalation daily. topiramate (TOPAMAX) 50 mg tablet   Yes No   Sig: Take 50 mg by mouth two (2) times a day. traZODone (DESYREL) 50 mg tablet   Yes No   Sig: nightly.       Facility-Administered Medications: None       Hospital Medications:  Current Facility-Administered Medications   Medication Dose Route Frequency    0.9% sodium chloride infusion  100 mL/hr IntraVENous CONTINUOUS    sodium chloride (NS) flush 5-10 mL  5-10 mL IntraVENous Q8H    sodium chloride (NS) flush 5-10 mL  5-10 mL IntraVENous PRN    midazolam (VERSED) injection 0.25-10 mg  0.25-10 mg IntraVENous Multiple    fentaNYL citrate (PF) injection 100 mcg  100 mcg IntraVENous Multiple    naloxone (NARCAN) injection 0.4 mg  0.4 mg IntraVENous Multiple    flumazenil (ROMAZICON) 0.1 mg/mL injection 0.2 mg  0.2 mg IntraVENous Multiple    simethicone (MYLICON) 17ME/0.5EG oral drops 80 mg  1.2 mL Oral Multiple    diphenhydrAMINE (BENADRYL) injection 50 mg  50 mg IntraVENous ONCE    atropine injection 0.5 mg  0.5 mg IntraVENous ONCE PRN    EPINEPHrine (ADRENALIN) 0.1 mg/mL syringe 1 mg  1 mg Endoscopically ONCE PRN     Facility-Administered Medications Ordered in Other Encounters   Medication Dose Route Frequency    0.9% sodium chloride infusion   IntraVENous CONTINUOUS       Social History:  Social History   Substance Use Topics    Smoking status: Former Smoker     Packs/day: 0.50     Years: 3.00     Quit date: 1988    Smokeless tobacco: Never Used    Alcohol use No       Family History:  Family History   Problem Relation Age of Onset    Heart Disease Mother     Asthma Mother     Diabetes Mother    [facility] COPD Mother     Cancer Sister      MELANOMA    Heart Disease Brother     Asthma Brother     Cancer Sister      BREAST    Heart Attack Brother     Cancer Paternal Grandmother     Anesth Problems Neg Hx              Review of Systems:      Constitutional: negative fever, negative chills, negative weight loss  Eyes:   negative visual changes  ENT:   negative sore throat, tongue or lip swelling  Respiratory:  negative cough, negative dyspnea  Cards:  negative for chest pain, palpitations, lower extremity edema  GI:   See HPI  :  negative for frequency, dysuria  Integument:  negative for rash and pruritus  Heme:  negative for easy bruising and gum/nose bleeding  Musculoskel: negative for myalgias,  back pain and muscle weakness  Neuro: negative for headaches, dizziness, vertigo  Psych:  negative for feelings of anxiety, depression       Objective:   Patient Vitals for the past 8 hrs:   BP Temp Pulse Resp SpO2 Height Weight   02/20/18 1458 138/64 98.1 °F (36.7 °C) 77 13 95 % 5' 5\" (1.651 m) 114.3 kg (252 lb)             EXAM:     NEURO-a&o   HEENT-wnl   LUNGS-clear    COR-regular rate and rhythym     ABD-soft , no tenderness, no rebound, good bs     EXT-no edema     Data Review     No results for input(s): WBC, HGB, HCT, PLT, HGBEXT, HCTEXT, PLTEXT in the last 72 hours. No results for input(s): NA, K, CL, CO2, BUN, CREA, GLU, PHOS, CA in the last 72 hours. No results for input(s): SGOT, GPT, AP, TBIL, TP, ALB, GLOB, GGT, AML, LPSE in the last 72 hours. No lab exists for component: AMYP, HLPSE  No results for input(s): INR, PTP, APTT in the last 72 hours.     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  Jon Burrell MD     2/20/2018  3:09 PM

## 2018-02-20 NOTE — ANESTHESIA POSTPROCEDURE EVALUATION
Post-Anesthesia Evaluation and Assessment    Patient: Cande Almendarez MRN: [id number]  SSN: [id number]    YOB: 1965  Age: 47 y.o. Sex: female       Cardiovascular Function/Vital Signs  Visit Vitals    /76    Pulse 77    Temp 36.7 °C (98 °F)    Resp 21    Ht 5' 5\" (1.651 m)    Wt 114.3 kg (252 lb)    SpO2 100%    Breastfeeding No    BMI 41.93 kg/m2       Patient is status post MAC anesthesia for Procedure(s):  ESOPHAGOGASTRODUODENOSCOPY (EGD), RADIAL EUS  ENDOSCOPIC ULTRASOUND (EUS). Nausea/Vomiting: None    Postoperative hydration reviewed and adequate. Pain:  Pain Scale 1: Numeric (0 - 10) (02/20/18 1554)  Pain Intensity 1: 0 (02/20/18 1554)   Managed    Neurological Status: At baseline    Mental Status and Level of Consciousness: Arousable    Pulmonary Status:   O2 Device: Room air (02/20/18 1554)   Adequate oxygenation and airway patent    Complications related to anesthesia: None    Post-anesthesia assessment completed.  No concerns    Signed By: Aruna Rubio MD     February 20, 2018

## 2018-02-23 ENCOUNTER — TELEPHONE (OUTPATIENT)
Dept: SURGERY | Age: 53
End: 2018-02-23

## 2018-02-23 NOTE — TELEPHONE ENCOUNTER
Please call patient back. Patient would like to speak with Dr Karon Casillas regarding what her next step is.

## 2018-03-05 ENCOUNTER — OFFICE VISIT (OUTPATIENT)
Dept: SURGERY | Age: 53
End: 2018-03-05

## 2018-03-05 VITALS
HEART RATE: 80 BPM | OXYGEN SATURATION: 94 % | TEMPERATURE: 97.6 F | BODY MASS INDEX: 41.65 KG/M2 | DIASTOLIC BLOOD PRESSURE: 68 MMHG | RESPIRATION RATE: 18 BRPM | SYSTOLIC BLOOD PRESSURE: 96 MMHG | WEIGHT: 250 LBS | HEIGHT: 65 IN

## 2018-03-05 DIAGNOSIS — K21.9 GASTROESOPHAGEAL REFLUX DISEASE, ESOPHAGITIS PRESENCE NOT SPECIFIED: ICD-10-CM

## 2018-03-05 DIAGNOSIS — K22.0 ACHALASIA OF ESOPHAGUS: ICD-10-CM

## 2018-03-05 DIAGNOSIS — E66.01 MORBID OBESITY WITH BMI OF 40.0-44.9, ADULT (HCC): Primary | ICD-10-CM

## 2018-03-05 NOTE — PROGRESS NOTES
Patient returns to address possible surgical strategies-  Her top priority is weight loss given her severe orthopedic issues with knees and hip problems  She has been told she has to accomplish weight loss before surgery is done for this problem  Of note she has a history of Vit D deficiency- taking Vit D 50k unites 2x per week  ? Etiology of this Vit D problem    She has the new diagnosis of high pressure LES Pressure in esophagus-   Review by Dr Reena Majano regarding interpretation of the manometry-  She has esophago-gastric junction outflow obstruction, which a variant of achalasia. For this, the current practice on the GI side of things is to proceed with radial EUS to rule out an infiltrating subepithelial lesion that may be causing the high pressure. It is manometrically different than achalasia (in fact, it is a new manifestation of high resolution manometry). If an infiltrating lesion is ruled out, then the options would be similar to management of achalasia. Botox may help short term. I have had one patient that felt worse after Botox. I would not advise pneumatic dilation. POEM or Heller would be definitive. Our office will be contacting her to schedule EUS. The above likely explains why she has such significant restrictive symptoms when her gastric bypass anatomy would suggest restrictive failure (enlarged gastric pouch, dilated gastrojejunostomy). She also has very severe GERD with exacerbations of asthma, regurgitation, inability to lay flat to sleep due to regurgitation. It now seems likely these issues are related to achalasia.     BP 96/68 (BP 1 Location: Left arm, BP Patient Position: Sitting)  Pulse 80  Temp 97.6 °F (36.4 °C) (Oral)   Resp 18  Ht 5' 5\" (1.651 m)  Wt 250 lb (113.4 kg)  SpO2 94%  BMI 41.6 kg/m2    Impression-  Patient with history of bariatric surgery who has persistent restriction, although this may be more related to new diagnosis of achalasia rather than her gastric bypass anatomy which does not appear to provide restriction due to dilatation of the bypass anatomy. She has severe GERD and an urgent desire to lose weight and move forward with treatment of her ongoing orthopedic issues which are causing her a great deal of pain. As for weight loss, given persistence of restriction, consideration of distal relocation of the Amrik intestinal limb to produce weight loss via malabsorption is perhaps the best choice to obtain reliable weight loss. We have had an extensive discussion about shortening her common channel to create a total alimentary limb length of 400-450 cm consistent with recent publications suggesting improved safety, less nutritional issues and acceptable weight loss with this approach. I have described risks including vitamin and other deficiencies, protein malnutrition, etc.  She understands that my recommendation would be to place a feeding G tube in the distal bypassed portion of the stomach in order to rescue vitamin and other nutritional problems that could arise. This tube would be expected to remain in place post op for months if all goes well with it. We have also discussed performing modified Heller myotomy with partial fundoplication (Alen) in order to try and alleviate the GERD problems as they seem most likely to be secondary to the achalasia variant found on manometry. Patient has asked us to consider revision of her previous procedure to try and enhance weight loss by conversion to a longer limb gastric bypass to reduce calorie absorption with a total alimentary limb length of 4-5 Meters depending on the anatomic measurements made intra-op.     Have seriously discussed 3-5x increased risk in this setting regarding revisional surgery and particularly increased due to previous surgery with scarring and adhesions/ thickened tissues etc.  Patient knows that all complications of gastric bypass can occur after re-do gastric bypass and are at higher risk including bleed, infection, leak, blood clots, obstruction, intestinal complications, etc.  Patient knows the surgical procedure will be significantly longer and therefore carry more risk. Patient knows there is a higher risk of open conversion. I have drawn the patient pictures of the anatomy and discussed the various surgical methods we use to lengthen her intestinal bypass length including concern that adding more restriction may not improve her weight loss in and of themselves and require adjunctive intestinal bypass in order to improve the weight loss objectives of this procedure. Patient appears to understand and accept these risks and is asking us to move forward with authorization / performance of the revision procedure despite the complexity and the significant increased risk of all complications including mortality which was specifically mentioned in order to help the patient understand the seriousness of this decision. Recommendations  1. Modified Heller myotomy with modified Alen fundoplication to treat achalasia  2. Small bowel transection with distal re-anastomosis to create total alimentary limb length of 400 cm  3. Insertion gastrostomy tube    More than 50% of this encounter was spent in direct counseling for this patient Counseling included topics such as the ongoing evaluation and treatment as well as options for future care. All questions have been answered in detail and the patient has expressed satisfaction regarding understanding of all this information. At least 60 minutes were spent in direct patient contact including more than 50% of the time spent directly counseling the patient as above during this encounter. Next steps-  1. Discuss with Dr. Sriram Omallye ([phone]) enocrinologist regarding patient's Vit D deficiency  2.   Obtain feedback from Dr. Emory Mendoza, Cardiology, who is scheduled to see her on Monday next week re Pulmonary HTN and ability to undergo surgery  3.   Request insurance authorization for above procedures

## 2018-03-05 NOTE — PROGRESS NOTES
1. Have you been to the ER, urgent care clinic since your last visit? Hospitalized since your last visit? No    2. Have you seen or consulted any other health care providers outside of the [address] [place] [address] since your last visit? Include any pap smears or colon screening.  No

## 2018-03-05 NOTE — MR AVS SNAPSHOT
[address] [place] [address] [address] 
[phone] Patient: Angela Bey MRN: [id number] :1965 Visit Information Date & Time Provider Department Dept. Phone Encounter #  
 3/5/2018 11:30 AM Joshua Schmidt MD [facility] [phone] [id number] Upcoming Health Maintenance Date Due DTaP/Tdap/Td series (1 - Tdap) 3/7/1987 PAP AKA CERVICAL CYTOLOGY 3/7/1987 BREAST CANCER SCRN MAMMOGRAM 3/7/2016 FOBT Q 1 YEAR AGE 50-75 3/7/2016 Influenza Age 5 to Adult 2017 Allergies as of 3/5/2018  Review Complete On: 3/5/2018 By: Avery Aldridge Severity Noted Reaction Type Reactions Levaquin [Levofloxacin] High 2015    Anaphylaxis Bactrim [Sulfamethoprim]  2017    Itching Morphine  2015    Other (comments) Current Immunizations  Reviewed on 2016 No immunizations on file. Not reviewed this visit Vitals BP Pulse Temp Resp Height(growth percentile) Weight(growth percentile) 96/68 (BP 1 Location: Left arm, BP Patient Position: Sitting) 80 97.6 °F (36.4 °C) (Oral) 18 5' 5\" (1.651 m) 250 lb (113.4 kg) SpO2 BMI OB Status Smoking Status 94% 41.6 kg/m2 Hysterectomy Former Smoker Vitals History BMI and BSA Data Body Mass Index Body Surface Area  
 41.6 kg/m 2 2.28 m 2 Preferred Pharmacy Pharmacy Name Phone [facility] [address]  [facility] [phone] Your Updated Medication List  
  
   
This list is accurate as of 3/5/18  1:34 PM.  Always use your most recent med list.  
  
  
  
  
 acetaminophen 325 mg tablet Commonly known as:  TYLENOL Take 2 Tabs by mouth every six (6) hours. Indications: PAIN  
  
 aspirin delayed-release 325 mg tablet Take 1 Tab by mouth two (2) times a day.  Start after Lovenox therapy is complete. calcium carbonate 200 mg calcium (500 mg) Chew Commonly known as:  TUMS Take 3 Tabs by mouth daily. CENTRUM SILVER PO Take  by mouth daily. cetirizine 10 mg tablet Commonly known as:  ZYRTEC Take 10 mg by mouth. Citracal + D tablet Generic drug:  calcium citrate-vitamin D3 Take  by mouth two (2) times a day. cloNIDine HCl 0.1 mg tablet Commonly known as:  CATAPRES  
0.1 mg three (3) times daily. cyanocobalamin 1,000 mcg tablet Take 1,000 mcg by mouth daily. Dexlansoprazole 60 mg Cpdb Take 30 mg by mouth daily. DULoxetine 60 mg capsule Commonly known as:  CYMBALTA 60 mg two (2) times a day. enoxaparin 40 mg/0.4 mL Commonly known as:  LOVENOX  
0.4 mL by SubCUTAneous route daily. Indications: DEEP VEIN THROMBOSIS PREVENTION  
  
 fentaNYL 75 mcg/hr Commonly known as:  DURAGESIC  
1 Patch by TransDERmal route every seventy-two (72) hours. gabapentin 300 mg capsule Commonly known as:  NEURONTIN  
300 mg three (3) times daily. HYDROmorphone 4 mg tablet Commonly known as:  DILAUDID Take 1 Tab by mouth every four (4) hours as needed. Max Daily Amount: 24 mg. Indications: PAIN, Not to be filled. This prescription is for instructional purposes only. Patient has supply at home. mometasone-formoterol 200-5 mcg/actuation HFA inhaler Commonly known as:  Gianna Rosanne Take 1 Puff by inhalation two (2) times a day. montelukast 10 mg tablet Commonly known as:  SINGULAIR  
10 mg nightly. NASONEX 50 mcg/actuation nasal spray Generic drug:  mometasone 2 Sprays by Both Nostrils route daily. * albuterol 2.5 mg /3 mL (0.083 %) nebulizer solution Commonly known as:  PROVENTIL VENTOLIN  
as needed. * PROAIR HFA 90 mcg/actuation inhaler Generic drug:  albuterol Take 2 Puffs by inhalation two (2) times daily as needed. senna-docusate 8.6-50 mg per tablet Commonly known as:  Praveena Samuel  
 Take 1 Tab by mouth two (2) times a day. Indications: CONSTIPATION  
  
 tamoxifen 20 mg tablet Commonly known as:  NOLVADEX  
20 mg nightly. tiotropium 18 mcg inhalation capsule Commonly known as:  Kailee Jessika Take 1 Cap by inhalation daily. topiramate 50 mg tablet Commonly known as:  TOPAMAX Take 50 mg by mouth two (2) times a day. traZODone 50 mg tablet Commonly known as:  DESYREL  
nightly. VITAMIN D2 50,000 unit capsule Generic drug:  ergocalciferol 50,000 Units every Tuesday and Thursday. * Notice: This list has 2 medication(s) that are the same as other medications prescribed for you. Read the directions carefully, and ask your doctor or other care provider to review them with you. Introducing [facility]! Dear Ivana Connolly: Thank you for requesting a [employer] account. Our records indicate that you already have an active [employer] account. You can access your account anytime at [email or web address]. [facility]/[employer] Did you know that you can access your hospital and ER discharge instructions at any time in [employer]? You can also review all of your test results from your hospital stay or ER visit. Additional Information If you have questions, please visit the Frequently Asked Questions section of the [employer] website at [email or web address] Remember, [employer] is NOT to be used for urgent needs. For medical emergencies, dial 911. Now available from your iPhone and Android! Please provide this summary of care documentation to your next provider. Your primary care clinician is listed as Dale Jhaveri. If you have any questions after today's visit, please call [phone].

## 2018-05-21 ENCOUNTER — OFFICE VISIT (OUTPATIENT)
Dept: SURGERY | Age: 53
End: 2018-05-21

## 2018-05-21 VITALS
DIASTOLIC BLOOD PRESSURE: 83 MMHG | TEMPERATURE: 97.9 F | OXYGEN SATURATION: 98 % | BODY MASS INDEX: 41.65 KG/M2 | HEART RATE: 65 BPM | RESPIRATION RATE: 20 BRPM | WEIGHT: 250 LBS | HEIGHT: 65 IN | SYSTOLIC BLOOD PRESSURE: 125 MMHG

## 2018-05-21 DIAGNOSIS — K22.0 ACHALASIA OF ESOPHAGUS: ICD-10-CM

## 2018-05-21 DIAGNOSIS — K21.9 GASTROESOPHAGEAL REFLUX DISEASE, ESOPHAGITIS PRESENCE NOT SPECIFIED: Primary | ICD-10-CM

## 2018-05-21 DIAGNOSIS — E66.01 MORBID OBESITY WITH BMI OF 40.0-44.9, ADULT (HCC): ICD-10-CM

## 2018-05-21 NOTE — MR AVS SNAPSHOT
[address] [place] [address] 
[phone] Patient: Shaka Jurado MRN: [id number] :[id number] Visit Information Date & Time Provider Department Dept. Phone Encounter #  
 2018  9:00 AM Jose Miguel Mccoy [id number] Upcoming Health Maintenance Date Due DTaP/Tdap/Td series (1 - Tdap) 3/7/1987 PAP AKA CERVICAL CYTOLOGY 3/7/1987 BREAST CANCER SCRN MAMMOGRAM 3/7/2016 FOBT Q 1 YEAR AGE 50-75 3/7/2016 MEDICARE YEARLY EXAM 3/28/2018 Influenza Age 5 to Adult 2018 Allergies as of 2018  Review Complete On: 2018 By: Coleen Boast Barley Severity Noted Reaction Type Reactions Levaquin [Levofloxacin] High 2015    Anaphylaxis Bactrim [Sulfamethoprim]  2017    Itching Morphine  2015    Other (comments) Current Immunizations  Reviewed on 2016 No immunizations on file. Not reviewed this visit You Were Diagnosed With   
  
 Codes Comments Gastroesophageal reflux disease, esophagitis presence not specified    -  Primary ICD-10-CM: K21.9 ICD-9-CM: 530.81 Achalasia of esophagus     ICD-10-CM: K22.0 ICD-9-CM: 530.0 Morbid obesity with BMI of 40.0-44.9, adult (HCC)     ICD-10-CM: E66.01, Z68.41 
ICD-9-CM: 278.01, V85.41 Vitals BP Pulse Temp Resp Height(growth percentile) Weight(growth percentile) 125/83 (BP 1 Location: Left arm, BP Patient Position: Sitting) 65 97.9 °F (36.6 °C) (Oral) 20 5' 5\" (1.651 m) 250 lb (113.4 kg) SpO2 BMI OB Status Smoking Status 98% 41.6 kg/m2 Hysterectomy Former Smoker BMI and BSA Data Body Mass Index Body Surface Area  
 41.6 kg/m 2 2.28 m 2 Preferred Pharmacy [facility] [address] [facility] [address]  [facility] [phone] Your Updated Medication List  
  
   
This list is accurate as of 5/21/18 10:23 AM.  Always use your most recent med list.  
  
  
  
  
 acetaminophen 325 mg tablet Commonly known as:  TYLENOL Take 2 Tabs by mouth every six (6) hours. Indications: PAIN  
  
 aspirin delayed-release 325 mg tablet Take 1 Tab by mouth two (2) times a day. Start after Lovenox therapy is complete. calcium carbonate 200 mg calcium (500 mg) Chew Commonly known as:  TUMS Take 3 Tabs by mouth daily. CENTRUM SILVER PO Take  by mouth daily. cetirizine 10 mg tablet Commonly known as:  ZYRTEC Take 10 mg by mouth. Citracal + D tablet Generic drug:  calcium citrate-vitamin D3 Take  by mouth two (2) times a day. cloNIDine HCl 0.1 mg tablet Commonly known as:  CATAPRES  
0.1 mg three (3) times daily. cyanocobalamin 1,000 mcg tablet Take 1,000 mcg by mouth daily. Dexlansoprazole 60 mg Cpdb Take 30 mg by mouth daily. DULoxetine 60 mg capsule Commonly known as:  CYMBALTA 60 mg two (2) times a day. enoxaparin 40 mg/0.4 mL Commonly known as:  LOVENOX  
0.4 mL by SubCUTAneous route daily. Indications: DEEP VEIN THROMBOSIS PREVENTION  
  
 fentaNYL 75 mcg/hr Commonly known as:  DURAGESIC  
1 Patch by TransDERmal route every seventy-two (72) hours. gabapentin 300 mg capsule Commonly known as:  NEURONTIN  
300 mg three (3) times daily. HYDROmorphone 4 mg tablet Commonly known as:  DILAUDID Take 1 Tab by mouth every four (4) hours as needed. Max Daily Amount: 24 mg. Indications: PAIN, Not to be filled. This prescription is for instructional purposes only. Patient has supply at home. mometasone-formoterol 200-5 mcg/actuation HFA inhaler Commonly known as:  Paulette Lion Take 1 Puff by inhalation two (2) times a day. montelukast 10 mg tablet Commonly known as:  SINGULAIR  
10 mg nightly. NASONEX 50 mcg/actuation nasal spray Generic drug:  mometasone 2 Sprays by Both Nostrils route daily. * albuterol 2.5 mg /3 mL (0.083 %) nebulizer solution Commonly known as:  PROVENTIL VENTOLIN  
as needed. * PROAIR HFA 90 mcg/actuation inhaler Generic drug:  albuterol Take 2 Puffs by inhalation two (2) times daily as needed. senna-docusate 8.6-50 mg per tablet Commonly known as:  Jacquenette Devoid Take 1 Tab by mouth two (2) times a day. Indications: CONSTIPATION  
  
 tamoxifen 20 mg tablet Commonly known as:  NOLVADEX  
20 mg nightly. tiotropium 18 mcg inhalation capsule Commonly known as:  Rory Aase Take 1 Cap by inhalation daily. topiramate 50 mg tablet Commonly known as:  TOPAMAX Take 50 mg by mouth two (2) times a day. traZODone 50 mg tablet Commonly known as:  DESYREL  
nightly. VITAMIN D2 50,000 unit capsule Generic drug:  ergocalciferol 50,000 Units every Tuesday and Thursday. * Notice: This list has 2 medication(s) that are the same as other medications prescribed for you. Read the directions carefully, and ask your doctor or other care provider to review them with you. Introducing [facility]! Dear Aditya Sears: Thank you for requesting a Bablic account. Our records indicate that you already have an active Bablic account. You can access your account anytime at [email or web address] Did you know that you can access your hospital and ER discharge instructions at any time in Bablic? You can also review all of your test results from your hospital stay or ER visit. Additional Information If you have questions, please visit the Frequently Asked Questions section of the Bablic website at [email or web address] Remember, Bablic is NOT to be used for urgent needs. For medical emergencies, dial 911. Now available from your iPhone and Android! Please provide this summary of care documentation to your next provider. Your primary care clinician is listed as Ely Thomas. If you have any questions after today's visit, please call [phone].

## 2018-05-21 NOTE — PROGRESS NOTES
1. Have you been to the ER, urgent care clinic since your last visit? Hospitalized since your last visit? No    2. Have you seen or consulted any other health care providers outside of the [address] [place] [address] since your last visit? Include any pap smears or colon screening.  No

## 2018-05-21 NOTE — PROGRESS NOTES
Patient returns to discuss her status regarding surgical issues of achalasia and recurrent morbid obesity  We had submitted to insurance regarding her acalasia and it was approved for modified Heller with fundoplication to treat her swallowing issues and GERD  We had requested distal relocation of alimentary limb to enhance malabsorption and this was denied  Weight loss is a high priority for this patient- she has had 3 of her doctors send me letters requesting that we provide her with surgical weight loss as behavioral therapies have not worked  She reports these include her asthma doctor, pain doctor and orthopedic doctor Gaby Keller-  She says he will not do her needed hip or knee surgery due to fear of complications unless she is able to lose weight. She had recent surgical removal of kidney stone done urgently. Active Ambulatory Problems     Diagnosis Date Noted    Sarcoidosis     Cramps, muscle, general 06/19/2015    Bilateral low back pain without sciatica 06/19/2015    Primary localized osteoarthritis of left hip 02/26/2016    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 ([facility] 03/12/2016    Obesity, morbid ([facility] 03/05/2018     Resolved Ambulatory Problems     Diagnosis Date Noted    No Resolved Ambulatory Problems     Past Medical History:   Diagnosis Date    Arthritis     Asthma     Autoimmune disease ([facility]     Cancer ([facility] 2012    Chronic pain     Fall     Headache     Ill-defined condition 2015    Nausea & vomiting     Sarcoidosis     Sleep apnea      Current Outpatient Prescriptions on File Prior to Visit   Medication Sig Dispense Refill    calcium citrate-vitamin D3 (CITRACAL + D) tablet Take  by mouth two (2) times a day.  acetaminophen (TYLENOL) 325 mg tablet Take 2 Tabs by mouth every six (6) hours. Indications: PAIN 100 Tab 0    HYDROmorphone (DILAUDID) 4 mg tablet Take 1 Tab by mouth every four (4) hours as needed. Max Daily Amount: 24 mg. Indications: PAIN, Not to be filled. This prescription is for instructional purposes only. Patient has supply at home. 120 Tab 0    tiotropium (SPIRIVA) 18 mcg inhalation capsule Take 1 Cap by inhalation daily.  cetirizine (ZYRTEC) 10 mg tablet Take 10 mg by mouth.  calcium carbonate (TUMS) 200 mg calcium (500 mg) chew Take 3 Tabs by mouth daily.  fentaNYL (DURAGESIC) 75 mcg/hr 1 Patch by TransDERmal route every seventy-two (72) hours.  gabapentin (NEURONTIN) 300 mg capsule 300 mg three (3) times daily. 1    DULoxetine (CYMBALTA) 60 mg capsule 60 mg two (2) times a day.  traZODone (DESYREL) 50 mg tablet nightly. [address] 90 mcg/actuation inhaler Take 2 Puffs by inhalation two (2) times daily as needed. 3    NASONEX 50 mcg/actuation nasal spray 2 Sprays by Both Nostrils route daily. 3    montelukast (SINGULAIR) 10 mg tablet 10 mg nightly. 3    albuterol (PROVENTIL VENTOLIN) 2.5 mg /3 mL (0.083 %) nebulizer solution as needed. 3    cloNIDine HCl (CATAPRES) 0.1 mg tablet 0.1 mg three (3) times daily. 2    cyanocobalamin 1,000 mcg tablet Take 1,000 mcg by mouth daily.  FOLIC ACID/MULTIVIT-MIN/LUTEIN (CENTRUM SILVER PO) Take  by mouth daily.  topiramate (TOPAMAX) 50 mg tablet Take 50 mg by mouth two (2) times a day.  enoxaparin (LOVENOX) 40 mg/0.4 mL 0.4 mL by SubCUTAneous route daily. Indications: DEEP VEIN THROMBOSIS PREVENTION 7 Syringe 0    aspirin delayed-release 325 mg tablet Take 1 Tab by mouth two (2) times a day. Start after Lovenox therapy is complete. 60 Tab 0    senna-docusate (PERICOLACE) 8.6-50 mg per tablet Take 1 Tab by mouth two (2) times a day. Indications: CONSTIPATION 60 Tab 0    mometasone-formoterol (DULERA) 200-5 mcg/actuation HFA inhaler Take 1 Puff by inhalation two (2) times a day.  Dexlansoprazole 60 mg CpDB Take 30 mg by mouth daily.  tamoxifen (NOLVADEX) 20 mg tablet 20 mg nightly.   11    VITAMIN D2 50,000 unit capsule 50,000 Units every Tuesday and Thursday. 0     No current facility-administered medications on file prior to visit. 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 Will add Donovan medication to her regimen to medically simulate the impact of fat malabsorption- of note she did have one occasion of fecal incontinence in bed which was very distressing to her. If she is unable to tolerate Donovan, then surgical malabsorption would be contraindicated in my view. She has requested that I appeal Nicky's decision about distal relocation of the Amrik limb to enhance malabsorption-  Will need to determine the impact of Donovan before any recommendation about proceeding with small bowel length revision - however I do think that surgical resection of the blind afferent limb should be part of the revision plan regarding Rx of her GERD and achalasia. More than 50% of this encounter was spent in direct counseling for this patient Counseling included topics such as the ongoing evaluation and treatment as well as options for future care. All questions have been answered in detail and the patient has expressed satisfaction regarding understanding of all this information.   At least 25 minutes were spent in direct patient contact including more than 50% of the time spent directly counseling the patient as above during this encounter.

## 2018-08-07 ENCOUNTER — OFFICE VISIT (OUTPATIENT)
Dept: SURGERY | Age: 53
End: 2018-08-07

## 2018-08-07 VITALS
BODY MASS INDEX: 42.82 KG/M2 | OXYGEN SATURATION: 98 % | HEART RATE: 88 BPM | TEMPERATURE: 98.7 F | DIASTOLIC BLOOD PRESSURE: 94 MMHG | HEIGHT: 65 IN | WEIGHT: 257 LBS | RESPIRATION RATE: 20 BRPM | SYSTOLIC BLOOD PRESSURE: 150 MMHG

## 2018-08-07 DIAGNOSIS — K21.9 GASTROESOPHAGEAL REFLUX DISEASE WITHOUT ESOPHAGITIS: Primary | ICD-10-CM

## 2018-08-07 DIAGNOSIS — J45.909 MODERATE ASTHMA, UNSPECIFIED WHETHER COMPLICATED, UNSPECIFIED WHETHER PERSISTENT: ICD-10-CM

## 2018-08-07 RX ORDER — ALENDRONATE SODIUM 70 MG/1
TABLET ORAL
COMMUNITY
End: 2019-11-20

## 2018-08-07 RX ORDER — LANOLIN ALCOHOL/MO/W.PET/CERES
CREAM (GRAM) TOPICAL
COMMUNITY

## 2018-08-07 RX ORDER — PANTOPRAZOLE SODIUM 40 MG/1
40 TABLET, DELAYED RELEASE ORAL DAILY
Qty: 30 TAB | Refills: 0 | Status: SHIPPED | OUTPATIENT
Start: 2018-08-07 | End: 2018-08-13 | Stop reason: SDUPTHER

## 2018-08-07 NOTE — PROGRESS NOTES
1. Have you been to the ER, urgent care clinic since your last visit? Hospitalized since your last visit? No    2. Have you seen or consulted any other health care providers outside of the [address] [place] [address] since your last visit? Include any pap smears or colon screening.  Yes eye doctor

## 2018-08-13 NOTE — PATIENT INSTRUCTIONS

## 2018-08-13 NOTE — PROGRESS NOTES
Subjective: The patient is a 47 y.o. obese female with severe GERD symptoms, asthma following gastric bypass in 2003.      Bariatric comorbidities present are   Past Medical History:   Diagnosis Date    Arthritis     Asthma     Autoimmune disease [facility])     ADULT MD    Cancer ([facility] 2012    LEFT BREAST    Chronic pain     Fall     Headache     Ill-defined condition 2015    PULMONARY HYPERTENSION    Nausea & vomiting     Sarcoidosis     Sleep apnea     USES BIPAP       Patient Active Problem List    Diagnosis Date Noted    Obesity, morbid ([facility] 03/05/2018    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 ([facility] 03/12/2016    Primary localized osteoarthritis of left hip 02/26/2016    Cramps, muscle, general 06/19/2015    Bilateral low back pain without sciatica 06/19/2015    Sarcoidosis      Past Medical History:   Diagnosis Date    Arthritis     Asthma     Autoimmune disease ([facility]     ADULT MD    Cancer ([facility] 2012    LEFT BREAST    Chronic pain     Fall     Headache     Ill-defined condition 2015    PULMONARY HYPERTENSION    Nausea & vomiting     Sarcoidosis     Sleep apnea     USES BIPAP      Past Surgical History:   Procedure Laterality Date    CHEST SURGERY PROCEDURE UNLISTED  2000    BIOPSY OUTER LUNG    HX BREAST RECONSTRUCTION  2012    HX BREAST RECONSTRUCTION  2012     3 SURGERIES TO REMOVE DEAD TISSUE    HX BREAST RECONSTRUCTION  2013 2015    LATISAMUS FLAP, IMPLANTS FAT GRAFTING     HX CERVICAL FUSION  2012    C5-C6 hardware    HX CHOLECYSTECTOMY  1998    HX COLPOSCOPY  2001    HX GASTRIC BYPASS  2003    HX GYN  2001    CERVIX CONE PROCEDURE    HX GYN  2008    EUTERO ABLATION    HX HEENT      HX HYSTERECTOMY  2014    HX HYSTEROSCOPY WITH ENDOMETRIAL ABLATION  2008    HX LUMBAR FUSION  2011    L5-S1 hardware    HX MASTECTOMY Bilateral 2012    HX ORTHOPAEDIC  2010    BONE GRAFT SCAFOID BONE WRIST    HX OTHER SURGICAL  2013    PAIN STIMULATOR    HX OTHER SURGICAL  2015    MUSCLE BIOPSY     HX TONSILLECTOMY  1982      Social History   Substance Use Topics    Smoking status: Former Smoker     Packs/day: 0.50     Years: 3.00     Quit date: 2/11/1988    Smokeless tobacco: Never Used    Alcohol use No      Family History   Problem Relation Age of Onset    Heart Disease Mother     Asthma Mother     Diabetes Mother    Virgil Harder COPD Mother     Cancer Sister      MELANOMA    Heart Disease Brother     Asthma Brother     Cancer Sister      BREAST    Heart Attack Brother     Cancer Paternal Grandmother     Anesth Problems Neg Hx       Prior to Admission medications    Medication Sig Start Date End Date Taking? Authorizing Provider   alendronate (FOSAMAX) 70 mg tablet Take  by mouth every seven (7) days. Yes Historical Provider   ferrous sulfate 325 mg (65 mg iron) tablet Take  by mouth Daily (before breakfast). Yes Historical Provider   pantoprazole (PROTONIX) 40 mg tablet Take 1 Tab by mouth daily. Indications: gastroesophageal reflux disease 8/7/18  Yes Yordan Romero MD   topiramate (TOPAMAX) 50 mg tablet Take 50 mg by mouth two (2) times a day. Yes Historical Provider   calcium citrate-vitamin D3 (CITRACAL + D) tablet Take  by mouth two (2) times a day. Yes Historical Provider   acetaminophen (TYLENOL) 325 mg tablet Take 2 Tabs by mouth every six (6) hours. Indications: PAIN 3/15/16  Yes Anastsaia Tellez MD   HYDROmorphone (DILAUDID) 4 mg tablet Take 1 Tab by mouth every four (4) hours as needed. Max Daily Amount: 24 mg. Indications: PAIN, Not to be filled. This prescription is for instructional purposes only. Patient has supply at home. 3/15/16  Yes Anastasia Tellez MD   mometasone-formoterol [facility]) 200-5 mcg/actuation HFA inhaler Take 1 Puff by inhalation two (2) times a day. Yes Historical Provider   cetirizine (ZYRTEC) 10 mg tablet Take 10 mg by mouth.    Yes Historical Provider   calcium carbonate (TUMS) 200 mg calcium (500 mg) chew Take 3 Tabs by mouth daily. Yes Historical Provider   fentaNYL (DURAGESIC) 75 mcg/hr 1 Patch by TransDERmal route every seventy-two (72) hours. Yes Historical Provider   gabapentin (NEURONTIN) 300 mg capsule 300 mg three (3) times daily. 5/26/15  Yes Historical Provider   DULoxetine (CYMBALTA) 60 mg capsule 60 mg two (2) times a day. 6/16/15  Yes Historical Provider   VITAMIN D2 50,000 unit capsule 50,000 Units every Tuesday and Thursday. 5/7/15  Yes Historical Provider   traZODone (DESYREL) 50 mg tablet nightly. 4/11/15  Yes Historical Provider   PROAIR HFA 90 mcg/actuation inhaler Take 2 Puffs by inhalation two (2) times daily as needed. 5/29/15  Yes Historical Provider   NASONEX 50 mcg/actuation nasal spray 2 Sprays by Both Nostrils route daily. 5/28/15  Yes Historical Provider   montelukast (SINGULAIR) 10 mg tablet 10 mg nightly. 5/28/15  Yes Historical Provider   albuterol (PROVENTIL VENTOLIN) 2.5 mg /3 mL (0.083 %) nebulizer solution as needed. 5/15/15  Yes Historical Provider   cloNIDine HCl (CATAPRES) 0.1 mg tablet 0.1 mg three (3) times daily. 6/9/15  Yes Historical Provider   cyanocobalamin 1,000 mcg tablet Take 1,000 mcg by mouth daily. Yes Historical Provider   FOLIC ACID/MULTIVIT-MIN/LUTEIN (CENTRUM SILVER PO) Take  by mouth daily. Yes Historical Provider   enoxaparin (LOVENOX) 40 mg/0.4 mL 0.4 mL by SubCUTAneous route daily. Indications: DEEP VEIN THROMBOSIS PREVENTION 3/15/16   Pretty Grider MD   aspirin delayed-release 325 mg tablet Take 1 Tab by mouth two (2) times a day. Start after Lovenox therapy is complete. 3/15/16   Pretty Grider MD   senna-docusate (PERICOLACE) 8.6-50 mg per tablet Take 1 Tab by mouth two (2) times a day. Indications: CONSTIPATION 2/26/16   Pretty Grider MD   tiotropium [facility]) 18 mcg inhalation capsule Take 1 Cap by inhalation daily. Historical Provider   Dexlansoprazole 60 mg CpDB Take 30 mg by mouth daily.     Historical Provider   tamoxifen (NOLVADEX) 20 mg tablet 20 mg nightly. 3/28/15   Historical Provider     Allergies   Allergen Reactions    Levaquin [Levofloxacin] Anaphylaxis    Bactrim [Sulfamethoprim] Itching    Morphine Other (comments)         Objective:     Visit Vitals    BP (!) 150/94    Pulse 88    Temp 98.7 °F (37.1 °C)    Resp 20    Ht 5' 5\" (1.651 m)    Wt 257 lb (116.6 kg)    SpO2 98%    BMI 42.77 kg/m2       Assessment:     GERD following gastric bypass; no improvement with medical management. She has extremely long afferent segment of Amrik limb which, along with size of gastric pouch (increased parietal cell mass) are resulting in GERD/regurgitation. Her asthma is likewise being exacerbated by this chronic reflux. I do not believe she has achalasia and I believe any myotomy may worsen her GERD symptoms. Recommended laparoscopic partial gastrectomy with resection of afferent portion of Amrik limb with upper endoscopy. This procedure will decrease parietal cell mass and by resecting the afferent limb, the \"reservoir effect\" af an elongated afferent limb will be eliminated. Plan:     1. Continue bariatric diet, Protonix. 2. Will submit for pre-certification for above procedure. 30 minutes spent with patient (greater than 50% of time in face-face consultation reviewing work-up, surgical options, anticipated results, post-op diet).       Signed By: Karley Lincoln MD     August 13, 2018

## 2018-08-14 RX ORDER — PANTOPRAZOLE SODIUM 40 MG/1
40 TABLET, DELAYED RELEASE ORAL DAILY
Qty: 90 TAB | Refills: 1 | Status: SHIPPED | OUTPATIENT
Start: 2018-08-14 | End: 2018-08-23

## 2018-08-14 NOTE — TELEPHONE ENCOUNTER
From: Gordon Vásquez  To: Shruthi Delgadillo MD  Sent: 8/13/2018 3:54 PM EDT  Subject: Medication Renewal Request    Original authorizing provider: MD Cortney De La Torre Tawny would like a refill of the following medications:  pantoprazole (PROTONIX) 40 mg tablet Shruthi Delgadillo MD]    Preferred pharmacy: [address] [place] [address] RADHA [address] AT [email or web address] [address] ([facility]    Comment:  Has this been called in to [facility] yet? Located on [address] [phone]. When I checked they had not received the prescription yet.

## 2018-08-23 RX ORDER — FENTANYL 37.5 UG/H
1 PATCH, EXTENDED RELEASE TRANSDERMAL
COMMUNITY
End: 2019-04-30

## 2018-08-23 RX ORDER — GABAPENTIN 300 MG/1
900 CAPSULE ORAL
COMMUNITY

## 2018-08-24 ENCOUNTER — ANESTHESIA EVENT (OUTPATIENT)
Dept: SURGERY | Age: 53
End: 2018-08-24
Payer: COMMERCIAL

## 2018-08-27 ENCOUNTER — HOSPITAL ENCOUNTER (OUTPATIENT)
Age: 53
Setting detail: OUTPATIENT SURGERY
Discharge: HOME | End: 2018-08-27
Attending: OTOLARYNGOLOGY | Admitting: OTOLARYNGOLOGY
Payer: COMMERCIAL

## 2018-08-27 ENCOUNTER — ANESTHESIA (OUTPATIENT)
Dept: SURGERY | Age: 53
End: 2018-08-27
Payer: COMMERCIAL

## 2018-08-27 VITALS
HEART RATE: 67 BPM | TEMPERATURE: 98 F | WEIGHT: 255 LBS | DIASTOLIC BLOOD PRESSURE: 86 MMHG | SYSTOLIC BLOOD PRESSURE: 123 MMHG | RESPIRATION RATE: 16 BRPM | BODY MASS INDEX: 42.49 KG/M2 | OXYGEN SATURATION: 96 % | HEIGHT: 65 IN

## 2018-08-27 DIAGNOSIS — S02.2XXS NASAL SEPTUM FRACTURE, SEQUELA: Primary | ICD-10-CM

## 2018-08-27 LAB — HGB BLD-MCNC: 11.8 G/DL (ref 11.5–16)

## 2018-08-27 PROCEDURE — 74011250636 HC RX REV CODE- 250/636

## 2018-08-27 PROCEDURE — 77030032490 HC SLV COMPR SCD KNE COVD -B: Performed by: OTOLARYNGOLOGY

## 2018-08-27 PROCEDURE — 77030011267 HC ELECTRD BLD COVD -A: Performed by: OTOLARYNGOLOGY

## 2018-08-27 PROCEDURE — 74011000250 HC RX REV CODE- 250: Performed by: OTOLARYNGOLOGY

## 2018-08-27 PROCEDURE — 76210000026 HC REC RM PH II 1 TO 1.5 HR: Performed by: OTOLARYNGOLOGY

## 2018-08-27 PROCEDURE — 77030010509 HC AIRWY LMA MSK TELE -A: Performed by: ANESTHESIOLOGY

## 2018-08-27 PROCEDURE — 74011250637 HC RX REV CODE- 250/637: Performed by: ANESTHESIOLOGY

## 2018-08-27 PROCEDURE — 85018 HEMOGLOBIN: CPT

## 2018-08-27 PROCEDURE — 77030002996 HC SUT SLK J&J -A: Performed by: OTOLARYNGOLOGY

## 2018-08-27 PROCEDURE — 76210000006 HC OR PH I REC 0.5 TO 1 HR: Performed by: OTOLARYNGOLOGY

## 2018-08-27 PROCEDURE — 77030018846 HC SOL IRR STRL H20 ICUM -A: Performed by: OTOLARYNGOLOGY

## 2018-08-27 PROCEDURE — 74011000250 HC RX REV CODE- 250

## 2018-08-27 PROCEDURE — 74011250636 HC RX REV CODE- 250/636: Performed by: ANESTHESIOLOGY

## 2018-08-27 PROCEDURE — 74011250636 HC RX REV CODE- 250/636: Performed by: OTOLARYNGOLOGY

## 2018-08-27 PROCEDURE — 77030011640 HC PAD GRND REM COVD -A: Performed by: OTOLARYNGOLOGY

## 2018-08-27 PROCEDURE — 77030020782 HC GWN BAIR PAWS FLX 3M -B

## 2018-08-27 PROCEDURE — 76060000034 HC ANESTHESIA 1.5 TO 2 HR: Performed by: OTOLARYNGOLOGY

## 2018-08-27 PROCEDURE — 77030018836 HC SOL IRR NACL ICUM -A: Performed by: OTOLARYNGOLOGY

## 2018-08-27 PROCEDURE — 76010000149 HC OR TIME 1 TO 1.5 HR: Performed by: OTOLARYNGOLOGY

## 2018-08-27 RX ORDER — LIDOCAINE HYDROCHLORIDE 10 MG/ML
0.1 INJECTION, SOLUTION EPIDURAL; INFILTRATION; INTRACAUDAL; PERINEURAL AS NEEDED
Status: DISCONTINUED | OUTPATIENT
Start: 2018-08-27 | End: 2018-08-27 | Stop reason: HOSPADM

## 2018-08-27 RX ORDER — CEFAZOLIN SODIUM/WATER 2 G/20 ML
2 SYRINGE (ML) INTRAVENOUS ONCE
Status: COMPLETED | OUTPATIENT
Start: 2018-08-27 | End: 2018-08-27

## 2018-08-27 RX ORDER — PANTOPRAZOLE SODIUM 40 MG/1
40 TABLET, DELAYED RELEASE ORAL
COMMUNITY
End: 2020-01-10

## 2018-08-27 RX ORDER — PROPOFOL 10 MG/ML
INJECTION, EMULSION INTRAVENOUS
Status: DISCONTINUED | OUTPATIENT
Start: 2018-08-27 | End: 2018-08-27 | Stop reason: HOSPADM

## 2018-08-27 RX ORDER — ONDANSETRON 2 MG/ML
INJECTION INTRAMUSCULAR; INTRAVENOUS AS NEEDED
Status: DISCONTINUED | OUTPATIENT
Start: 2018-08-27 | End: 2018-08-27 | Stop reason: HOSPADM

## 2018-08-27 RX ORDER — HYDROCODONE BITARTRATE AND ACETAMINOPHEN 5; 325 MG/1; MG/1
2 TABLET ORAL
Qty: 30 TAB | Refills: 0 | Status: SHIPPED | OUTPATIENT
Start: 2018-08-27 | End: 2019-11-20

## 2018-08-27 RX ORDER — DIPHENHYDRAMINE HYDROCHLORIDE 50 MG/ML
12.5 INJECTION, SOLUTION INTRAMUSCULAR; INTRAVENOUS AS NEEDED
Status: DISCONTINUED | OUTPATIENT
Start: 2018-08-27 | End: 2018-08-27 | Stop reason: HOSPADM

## 2018-08-27 RX ORDER — DEXAMETHASONE SODIUM PHOSPHATE 4 MG/ML
INJECTION, SOLUTION INTRA-ARTICULAR; INTRALESIONAL; INTRAMUSCULAR; INTRAVENOUS; SOFT TISSUE AS NEEDED
Status: DISCONTINUED | OUTPATIENT
Start: 2018-08-27 | End: 2018-08-27 | Stop reason: HOSPADM

## 2018-08-27 RX ORDER — HYDROCODONE BITARTRATE AND ACETAMINOPHEN 5; 325 MG/1; MG/1
1 TABLET ORAL
Status: CANCELLED | OUTPATIENT
Start: 2018-08-27

## 2018-08-27 RX ORDER — MIDAZOLAM HYDROCHLORIDE 1 MG/ML
INJECTION, SOLUTION INTRAMUSCULAR; INTRAVENOUS AS NEEDED
Status: DISCONTINUED | OUTPATIENT
Start: 2018-08-27 | End: 2018-08-27 | Stop reason: HOSPADM

## 2018-08-27 RX ORDER — SODIUM CHLORIDE, SODIUM LACTATE, POTASSIUM CHLORIDE, CALCIUM CHLORIDE 600; 310; 30; 20 MG/100ML; MG/100ML; MG/100ML; MG/100ML
100 INJECTION, SOLUTION INTRAVENOUS CONTINUOUS
Status: DISCONTINUED | OUTPATIENT
Start: 2018-08-27 | End: 2018-08-27 | Stop reason: HOSPADM

## 2018-08-27 RX ORDER — SODIUM CHLORIDE 0.9 % (FLUSH) 0.9 %
5-10 SYRINGE (ML) INJECTION EVERY 8 HOURS
Status: DISCONTINUED | OUTPATIENT
Start: 2018-08-27 | End: 2018-08-27 | Stop reason: HOSPADM

## 2018-08-27 RX ORDER — MIDAZOLAM HYDROCHLORIDE 1 MG/ML
1 INJECTION, SOLUTION INTRAMUSCULAR; INTRAVENOUS AS NEEDED
Status: DISCONTINUED | OUTPATIENT
Start: 2018-08-27 | End: 2018-08-27 | Stop reason: HOSPADM

## 2018-08-27 RX ORDER — ONDANSETRON 2 MG/ML
4 INJECTION INTRAMUSCULAR; INTRAVENOUS
Status: CANCELLED | OUTPATIENT
Start: 2018-08-27

## 2018-08-27 RX ORDER — MIDAZOLAM HYDROCHLORIDE 1 MG/ML
0.5 INJECTION, SOLUTION INTRAMUSCULAR; INTRAVENOUS
Status: DISCONTINUED | OUTPATIENT
Start: 2018-08-27 | End: 2018-08-27 | Stop reason: HOSPADM

## 2018-08-27 RX ORDER — KETAMINE HYDROCHLORIDE 10 MG/ML
INJECTION, SOLUTION INTRAMUSCULAR; INTRAVENOUS AS NEEDED
Status: DISCONTINUED | OUTPATIENT
Start: 2018-08-27 | End: 2018-08-27 | Stop reason: HOSPADM

## 2018-08-27 RX ORDER — FENTANYL CITRATE 50 UG/ML
50 INJECTION, SOLUTION INTRAMUSCULAR; INTRAVENOUS AS NEEDED
Status: DISCONTINUED | OUTPATIENT
Start: 2018-08-27 | End: 2018-08-27 | Stop reason: HOSPADM

## 2018-08-27 RX ORDER — DEXMEDETOMIDINE HYDROCHLORIDE 4 UG/ML
INJECTION, SOLUTION INTRAVENOUS AS NEEDED
Status: DISCONTINUED | OUTPATIENT
Start: 2018-08-27 | End: 2018-08-27 | Stop reason: HOSPADM

## 2018-08-27 RX ORDER — DEXTROSE, SODIUM CHLORIDE, SODIUM LACTATE, POTASSIUM CHLORIDE, AND CALCIUM CHLORIDE 5; .6; .31; .03; .02 G/100ML; G/100ML; G/100ML; G/100ML; G/100ML
100 INJECTION, SOLUTION INTRAVENOUS CONTINUOUS
Status: DISCONTINUED | OUTPATIENT
Start: 2018-08-27 | End: 2018-08-27 | Stop reason: HOSPADM

## 2018-08-27 RX ORDER — SODIUM CHLORIDE, SODIUM LACTATE, POTASSIUM CHLORIDE, CALCIUM CHLORIDE 600; 310; 30; 20 MG/100ML; MG/100ML; MG/100ML; MG/100ML
INJECTION, SOLUTION INTRAVENOUS
Status: DISCONTINUED | OUTPATIENT
Start: 2018-08-27 | End: 2018-08-27 | Stop reason: HOSPADM

## 2018-08-27 RX ORDER — FENTANYL CITRATE 50 UG/ML
25 INJECTION, SOLUTION INTRAMUSCULAR; INTRAVENOUS
Status: DISCONTINUED | OUTPATIENT
Start: 2018-08-27 | End: 2018-08-27 | Stop reason: HOSPADM

## 2018-08-27 RX ORDER — SCOLOPAMINE TRANSDERMAL SYSTEM 1 MG/1
1 PATCH, EXTENDED RELEASE TRANSDERMAL
Status: DISCONTINUED | OUTPATIENT
Start: 2018-08-27 | End: 2018-08-27 | Stop reason: HOSPADM

## 2018-08-27 RX ORDER — PROPOFOL 10 MG/ML
INJECTION, EMULSION INTRAVENOUS AS NEEDED
Status: DISCONTINUED | OUTPATIENT
Start: 2018-08-27 | End: 2018-08-27 | Stop reason: HOSPADM

## 2018-08-27 RX ORDER — ROPIVACAINE HYDROCHLORIDE 5 MG/ML
150 INJECTION, SOLUTION EPIDURAL; INFILTRATION; PERINEURAL AS NEEDED
Status: DISCONTINUED | OUTPATIENT
Start: 2018-08-27 | End: 2018-08-27 | Stop reason: HOSPADM

## 2018-08-27 RX ORDER — SODIUM CHLORIDE 0.9 % (FLUSH) 0.9 %
5-10 SYRINGE (ML) INJECTION AS NEEDED
Status: DISCONTINUED | OUTPATIENT
Start: 2018-08-27 | End: 2018-08-27 | Stop reason: HOSPADM

## 2018-08-27 RX ORDER — LIDOCAINE HYDROCHLORIDE AND EPINEPHRINE 10; 10 MG/ML; UG/ML
INJECTION, SOLUTION INFILTRATION; PERINEURAL AS NEEDED
Status: DISCONTINUED | OUTPATIENT
Start: 2018-08-27 | End: 2018-08-27 | Stop reason: HOSPADM

## 2018-08-27 RX ORDER — BACITRACIN ZINC 500 UNIT/G
OINTMENT (GRAM) TOPICAL 2 TIMES DAILY
Qty: 15 G | Refills: 0 | Status: SHIPPED | OUTPATIENT
Start: 2018-08-27 | End: 2019-11-20

## 2018-08-27 RX ORDER — SODIUM CHLORIDE 0.9 % (FLUSH) 0.9 %
5-10 SYRINGE (ML) INJECTION EVERY 8 HOURS
Status: CANCELLED | OUTPATIENT
Start: 2018-08-27

## 2018-08-27 RX ORDER — SODIUM CHLORIDE 0.9 % (FLUSH) 0.9 %
5-10 SYRINGE (ML) INJECTION AS NEEDED
Status: CANCELLED | OUTPATIENT
Start: 2018-08-27

## 2018-08-27 RX ADMIN — ONDANSETRON 4 MG: 2 INJECTION INTRAMUSCULAR; INTRAVENOUS at 16:54

## 2018-08-27 RX ADMIN — PROPOFOL 75 MCG/KG/MIN: 10 INJECTION, EMULSION INTRAVENOUS at 15:56

## 2018-08-27 RX ADMIN — SODIUM CHLORIDE, SODIUM LACTATE, POTASSIUM CHLORIDE, CALCIUM CHLORIDE: 600; 310; 30; 20 INJECTION, SOLUTION INTRAVENOUS at 15:54

## 2018-08-27 RX ADMIN — SODIUM CHLORIDE, SODIUM LACTATE, POTASSIUM CHLORIDE, AND CALCIUM CHLORIDE 100 ML/HR: 600; 310; 30; 20 INJECTION, SOLUTION INTRAVENOUS at 15:18

## 2018-08-27 RX ADMIN — DEXMEDETOMIDINE HYDROCHLORIDE 4 MCG: 4 INJECTION, SOLUTION INTRAVENOUS at 15:47

## 2018-08-27 RX ADMIN — PROPOFOL 150 MG: 10 INJECTION, EMULSION INTRAVENOUS at 16:14

## 2018-08-27 RX ADMIN — KETAMINE HYDROCHLORIDE 20 MG: 10 INJECTION, SOLUTION INTRAMUSCULAR; INTRAVENOUS at 16:04

## 2018-08-27 RX ADMIN — DEXAMETHASONE SODIUM PHOSPHATE 4 MG: 4 INJECTION, SOLUTION INTRA-ARTICULAR; INTRALESIONAL; INTRAMUSCULAR; INTRAVENOUS; SOFT TISSUE at 16:19

## 2018-08-27 RX ADMIN — DEXMEDETOMIDINE HYDROCHLORIDE 8 MCG: 4 INJECTION, SOLUTION INTRAVENOUS at 15:49

## 2018-08-27 RX ADMIN — DEXMEDETOMIDINE HYDROCHLORIDE 8 MCG: 4 INJECTION, SOLUTION INTRAVENOUS at 15:51

## 2018-08-27 RX ADMIN — Medication 2 G: at 16:20

## 2018-08-27 RX ADMIN — MIDAZOLAM HYDROCHLORIDE 2 MG: 1 INJECTION, SOLUTION INTRAMUSCULAR; INTRAVENOUS at 15:49

## 2018-08-27 NOTE — IP AVS SNAPSHOT
[address] [facility] [place] [address] 
[phone] Patient: Jose Antonio Ayala MRN: [id number] :1965 About your hospitalization You were admitted on:  2018 You last received care in the:  [facility] PACU You were discharged on:  2018 Why you were hospitalized Your primary diagnosis was:  Not on File Follow-up Information Follow up With Details Comments Contact Info Nick Burr MD Schedule an appointment as soon as possible for a visit in 1 week(s)  [facility] [address] [place] [address] 
[phone] Discharge Orders None A check robert indicates which time of day the medication should be taken. My Medications START taking these medications Instructions Each Dose to Equal  
 Morning Noon Evening Bedtime  
 bacitracin zinc ointment Commonly known as:  BACITRACIN Your last dose was: Your next dose is:    
   
   
 Apply  to affected area two (2) times a day. HYDROcodone-acetaminophen 5-325 mg per tablet Commonly known as:  Lenon Gauze Your last dose was: Your next dose is: Take 2 Tabs by mouth every six (6) hours as needed for Pain for up to 30 doses. Max Daily Amount: 8 Tabs. Indications: Pain 2 Tab CHANGE how you take these medications Instructions Each Dose to Equal  
 Morning Noon Evening Bedtime  
 acetaminophen 325 mg tablet Commonly known as:  TYLENOL What changed:   
- when to take this 
- reasons to take this Your last dose was: Your next dose is: Take 2 Tabs by mouth every six (6) hours. Indications: PAIN  
 650 mg CONTINUE taking these medications Instructions Each Dose to Equal  
 Morning Noon Evening Bedtime  
 alendronate 70 mg tablet Commonly known as:  FOSAMAX Your last dose was: Your next dose is: Take  by mouth every seven (7) days. SUNDAYS  
     
   
   
   
  
 calcium carbonate 200 mg calcium (500 mg) Chew Commonly known as:  TUMS Your last dose was: Your next dose is: Take 3 Tabs by mouth daily. 3 Tab CENTRUM SILVER PO Your last dose was: Your next dose is: Take 1 Tab by mouth daily. 1 Tab  
    
   
   
   
  
 cetirizine 10 mg tablet Commonly known as:  ZYRTEC Your last dose was: Your next dose is: Take 10 mg by mouth nightly. 10 mg  
    
   
   
   
  
 cloNIDine HCl 0.1 mg tablet Commonly known as:  CATAPRES Your last dose was: Your next dose is:    
   
   
 0.1 mg three (3) times daily. 0.1 mg  
    
   
   
   
  
 cyanocobalamin 1,000 mcg tablet Your last dose was: Your next dose is: Take 1,000 mcg by mouth daily. 1000 mcg Dexlansoprazole 60 mg Cpdb Your last dose was: Your next dose is: Take 30 mg by mouth daily. 30 mg DULoxetine 60 mg capsule Commonly known as:  CYMBALTA Your last dose was: Your next dose is:    
   
   
 60 mg two (2) times a day. 60 mg  
    
   
   
   
  
 fentaNYL 37.5 mcg/hour Pt72 Your last dose was: Your next dose is:    
   
   
 1 Patch by TransDERmal route every three (3) days. 1 Patch  
    
   
   
   
  
 ferrous sulfate 325 mg (65 mg iron) tablet Your last dose was: Your next dose is: Take  by mouth Daily (before breakfast). * gabapentin 300 mg capsule Commonly known as:  NEURONTIN Your last dose was: Your next dose is: Take 900 mg by mouth nightly. 900 mg  
    
   
   
   
  
 * gabapentin 300 mg capsule Commonly known as:  NEURONTIN Your last dose was: Your next dose is:    
   
   
 300 mg two (2) times a day. 7AM,  12NN  
 300 mg HYDROmorphone 4 mg tablet Commonly known as:  DILAUDID Your last dose was: Your next dose is: Take 1 Tab by mouth every four (4) hours as needed. Max Daily Amount: 24 mg. Indications: PAIN, Not to be filled. This prescription is for instructional purposes only. Patient has supply at home. 4 mg  
    
   
   
   
  
 montelukast 10 mg tablet Commonly known as:  SINGULAIR Your last dose was: Your next dose is:    
   
   
 10 mg nightly. 10 mg  
    
   
   
   
  
 NASONEX 50 mcg/actuation nasal spray Generic drug:  mometasone Your last dose was: Your next dose is: 2 Sprays by Both Nostrils route daily. 2 Spray * albuterol 2.5 mg /3 mL (0.083 %) nebulizer solution Commonly known as:  PROVENTIL VENTOLIN Your last dose was: Your next dose is:    
   
   
 as needed. * PROAIR HFA 90 mcg/actuation inhaler Generic drug:  albuterol Your last dose was: Your next dose is: Take 2 Puffs by inhalation two (2) times daily as needed. 2 Puff PROTONIX 40 mg tablet Generic drug:  pantoprazole Your last dose was: Your next dose is: Take 40 mg by mouth daily. 40 mg  
    
   
   
   
  
 tiotropium 18 mcg inhalation capsule Commonly known as:  Barrett Hendrickson Your last dose was: Your next dose is: Take 1 Cap by inhalation daily. 1 Cap  
    
   
   
   
  
 traZODone 50 mg tablet Commonly known as:  John Schneider Your last dose was: Your next dose is: Take 50 mg by mouth nightly. 50 mg  
    
   
   
   
  
 VITAMIN D2 50,000 unit capsule Generic drug:  ergocalciferol Your last dose was: Your next dose is: 50,000 Units two (2) times a week. Sunday AND THURSDAY  
 [address] Units * Notice: This list has 4 medication(s) that are the same as other medications prescribed for you. Read the directions carefully, and ask your doctor or other care provider to review them with you. Where to Get Your Medications These medications were sent to [address]  [facility] ([facility]  [address] [place] [address] Hours:  24-hours Phone:  [phone]  
  bacitracin zinc ointment Information on where to get these meds will be given to you by the nurse or doctor. ! Ask your nurse or doctor about these medications HYDROcodone-acetaminophen 5-325 mg per tablet Opioid Education Prescription Opioids: What You Need to Know: 
 
Prescription opioids can be used to help relieve moderate-to-severe pain and are often prescribed following a surgery or injury, or for certain health conditions. These medications can be an important part of treatment but also come with serious risks. Opioids are strong pain medicines. Examples include hydrocodone, oxycodone, fentanyl, and morphine. Heroin is an example of an illegal opioid. It is important to work with your health care provider to make sure you are getting the safest, most effective care. WHAT ARE THE RISKS AND SIDE EFFECTS OF OPIOID USE? Prescription opioids carry serious risks of addiction and overdose, especially with prolonged use. An opioid overdose, often marked by slow breathing, can cause sudden death. The use of prescription opioids can have a number of side effects as well, even when taken as directed. · Tolerance-meaning you might need to take more of a medication for the same pain relief · Physical dependence-meaning you have symptoms of withdrawal when the medication is stopped. Withdrawal symptoms can include nausea, sweating, chills, diarrhea, stomach cramps, and muscle aches. Withdrawal can last up to several weeks, depending on which drug you took and how long you took it. · Increased sensitivity to pain · Constipation · Nausea, vomiting, and dry mouth · Sleepiness and dizziness · Confusion · Depression · Low levels of testosterone that can result in lower sex drive, energy, and strength · Itching and sweating RISKS ARE GREATER WITH:      
· History of drug misuse, substance use disorder, or overdose · Mental health conditions (such as depression or anxiety) · Sleep apnea · Older age (72 years or older) · Pregnancy Avoid alcohol while taking prescription opioids. Also, unless specifically advised by your health care provider, medications to avoid include: · Benzodiazepines (such as Xanax or Valium) · Muscle relaxants (such as Soma or Flexeril) · Hypnotics (such as Ambien or Lunesta) · Other prescription opioids KNOW YOUR OPTIONS Talk to your health care provider about ways to manage your pain that don't involve prescription opioids. Some of these options may actually work better and have fewer risks and side effects. Options may include: 
· Pain relievers such as acetaminophen, ibuprofen, and naproxen · Some medications that are also used for depression or seizures · Physical therapy and exercise · Counseling to help patients learn how to cope better with triggers of pain and stress. · Application of heat or cold compress · Massage therapy · Relaxation techniques Be Informed Make sure you know the name of your medication, how much and how often to take it, and its potential risks & side effects. IF YOU ARE PRESCRIBED OPIOIDS FOR PAIN: 
· Never take opioids in greater amounts or more often than prescribed. Remember the goal is not to be pain-free but to manage your pain at a tolerable level. · Follow up with your primary care provider to: · Work together to create a plan on how to manage your pain. · Talk about ways to help manage your pain that don't involve prescription opioids. · Talk about any and all concerns and side effects. · Help prevent misuse and abuse. · Never sell or share prescription opioids · Help prevent misuse and abuse. · Store prescription opioids in a secure place and out of reach of others (this may include visitors, children, friends, and family). · Safely dispose of unused/unwanted prescription opioids: Find your community drug take-back program or your pharmacy mail-back program, or flush them down the toilet, following guidance from the Food and Drug Administration (www.fda.gov/Drugs/ResourcesForYou). · Visit www.cdc.gov/drugoverdose to learn about the risks of opioid abuse and overdose. · If you believe you may be struggling with addiction, tell your health care provider and ask for guidance or call [employer] at 3-933-538-QCCV. Discharge Instructions ICE & ELEVATION for the next 48 hours Wound Care: After Your Visit Your Care Instructions Taking good care of your wound at home will help it heal quickly and reduce your chance of infection. The doctor has checked you carefully, but problems can develop later. If you notice any problems or new symptoms, get medical treatment right away. Follow-up care is a key part of your treatment and safety. Be sure to make and go to all appointments, and call your doctor if you are having problems. It's also a good idea to know your test results and keep a list of the medicines you take. How can you care for yourself at home? · Clean the area with soap and water 2 times a day unless your doctor gives you different instructions. Don't use hydrogen peroxide or alcohol, which can slow healing. ¨ You may cover the wound with a thin layer of antibiotic ointment, such as bacitracin, and a nonstick bandage. ¨ Apply more ointment and replace the bandage as needed. · Take pain medicines exactly as directed. Some pain is normal with a wound, but do not ignore pain that is getting worse instead of better. You could have an infection. ¨ If the doctor gave you a prescription medicine for pain, take it as prescribed. ¨ If you are not taking a prescription pain medicine, ask your doctor if you can take an over-the-counter medicine. · Your doctor may have closed your wound with stitches (sutures), staples, or skin glue. ¨ If you have stitches, your doctor may remove them after several days to 2 weeks. Or you may have stitches that dissolve on their own. ¨ If you have staples, your doctor may remove them after 7 to 10 days. ¨ If your wound was closed with skin glue, the glue will wear off in a few days to 2 weeks. When should you call for help? Call your doctor now or seek immediate medical care if: 
· You have signs of infection, such as: 
¨ Increased pain, swelling, warmth, or redness near the wound. ¨ Red streaks leading from the wound. ¨ Pus draining from the wound. ¨ A fever. · You bleed so much from your incision that you soak one or more bandages over 2 to 4 hours. Watch closely for changes in your health, and be sure to contact your doctor if: · The wound is not getting better each day. Where can you learn more? Go to [employer].be Enter B369 in the search box to learn more about \"Wound Care: After Your Visit. \"  
© 9163-6396 Healthwise, Incorporated. Care instructions adapted under license by Veronica Quesada (which disclaims liability or warranty for this information).  This care instruction is for use with your licensed healthcare professional. If you have questions about a medical condition or this instruction, always ask your healthcare professional. [employer] disclaims any warranty or liability for your use of this information. Content Version: 67.2.465680; Last Revised: April 23, 2012 
 
 
______________________________________________________________________ Anesthesia Discharge Instructions After general anesthesia or intervenous sedation, for 24 hours or while taking prescription Narcotics: · Limit your activities · Do not drive or operate hazardous machinery · If you have not urinated within 8 hours after discharge, please contact your surgeon on call. · Do not make important personal or business decisions · Do not drink alcoholic beverages Report the following to your surgeon: 
· Excessive pain, swelling, redness or odor of or around the surgical area · Temperature over 100.5 degrees · Nausea and vomiting lasting longer than 4 hours or if unable to take medication · Any signs of decreased circulation or nerve impairment to extremity:  Change in color, persistent numbness, tingling, coldness or increased pain. · Any questions Introducing [facility]! Dear Megan Benitez: Thank you for requesting a [employer] account. Our records indicate that you already have an active [employer] account. You can access your account anytime at [email or web address] Did you know that you can access your hospital and ER discharge instructions at any time in [employer]? You can also review all of your test results from your hospital stay or ER visit. Additional Information If you have questions, please visit the Frequently Asked Questions section of the [employer] website at [email or web address] Remember, [employer] is NOT to be used for urgent needs. For medical emergencies, dial 911. Now available from your iPhone and Android! Introducing Eduardo Mitchell As a [employer] patient, I wanted to make you aware of our electronic visit tool called Eduardo Mitchell. [employer] 24/7 allows you to connect within minutes with a medical provider 24 hours a day, seven days a week via a mobile device or tablet or logging into a secure website from your computer. You can access [employer] from anywhere in the [place]. A virtual visit might be right for you when you have a simple condition and feel like you just dont want to get out of bed, or cant get away from work for an appointment, when your regular [employer] provider is not available (evenings, weekends or holidays), or when youre out of town and need minor care. Electronic visits cost only $49 and if the [employer] 24/7 provider determines a prescription is needed to treat your condition, one can be electronically transmitted to a nearby pharmacy*. Please take a moment to enroll today if you have not already done so. The enrollment process is free and takes just a few minutes. To enroll, please download the [employer] 24/7 missy to your tablet or phone, or visit [email or web address] to enroll on your computer. And, as an [address] [place] [address] patient with a [employer] account, the results of your visits will be scanned into your electronic medical record and your primary care provider will be able to view the scanned results. We urge you to continue to see your regular [employer] provider for your ongoing medical care. And while your primary care provider may not be the one available when you seek a [employer] virtual visit, the peace of mind you get from getting a real diagnosis real time can be priceless. For more information on [employer], view our Frequently Asked Questions (FAQs) at [email or web address]. Sincerely, 
 
Maged Watters MD 
Chief Medical Officer [employer] *:  certain medications cannot be prescribed via [employer] Providers Seen During Your Hospitalization Provider Specialty Primary office phone Michelle Carvalho MD Otolaryngology [phone] Your Primary Care Physician (PCP) Primary Care Physician Office Phone Office Fax [place], [facility] [facility] [phone] You are allergic to the following Allergen Reactions Levaquin (Levofloxacin) Anaphylaxis Bactrim (Sulfamethoprim) Itching Morphine Other (comments) PT STATED DOES NOT WORK ON HER Recent Documentation Height Weight BMI OB Status Smoking Status 1.651 m 115.7 kg 42.43 kg/m2 Hysterectomy Former Smoker Emergency Contacts Name Discharge Info Relation Home Work Mobile [address] CAREGIVER [3] Spouse [3] 35 [address] CAREGIVER [3] Other Relative [6] [phone] Patient Belongings The following personal items are in your possession at time of discharge: 
  Dental Appliances: None  Visual Aid: Glasses   Hearing Aids/Status: Does not own  Home Medications: Kept at bedside (INHALERS TO PACU)   Jewelry: None  Clothing: Other (comment) (CLOTHING & SHOES TO PACU)    Other Valuables:  (GLASSES & CANE TO PACU) Please provide this summary of care documentation to your next provider. Signatures-by signing, you are acknowledging that this After Visit Summary has been reviewed with you and you have received a copy. Patient Signature:  ____________________________________________________________ Date:  ____________________________________________________________  
  
Padmini Valverde Provider Signature:  ____________________________________________________________ Date:  ____________________________________________________________

## 2018-08-27 NOTE — ROUTINE PROCESS
Patient: Vesta Garcia MRN: [id number]  SSN: [id number]   YOB: 1965  Age: 47 y.o. Sex: female     Patient is status post Procedure(s):  COMPLEX REPAIR OF FACE. Surgeon(s) and Role:     * Maryann Gaytan MD - Primary    Local/Dose/Irrigation:                   Peripheral IV 08/27/18 Right Wrist (Active)   Site Assessment Clean, dry, & intact 8/27/2018  3:17 PM   Phlebitis Assessment 0 8/27/2018  3:17 PM   Infiltration Assessment 0 8/27/2018  3:17 PM   Dressing Status Clean, dry, & intact; New 8/27/2018  3:17 PM   Dressing Type Tape;Transparent 8/27/2018  3:17 PM   Hub Color/Line Status Blue 8/27/2018  3:17 PM                           Dressing/Packing:  Wound Face-DRESSING TYPE: Other (Comment) (Bacitracin opth oint to incision sites on nose) (08/27/18 1700)  Splint/Cast:  ]    Other:

## 2018-08-27 NOTE — ANESTHESIA POSTPROCEDURE EVALUATION
Post-Anesthesia Evaluation and Assessment    Patient: Leyla Dominguez MRN: [id number]  SSN: [id number]    YOB: 1965  Age: 47 y.o. Sex: female       Cardiovascular Function/Vital Signs  Visit Vitals    /76 (BP 1 Location: Right arm, BP Patient Position: At rest)    Pulse 68    Temp 36.6 °C (97.8 °F)    Resp 14    Ht 5' 5\" (1.651 m)    Wt 115.7 kg (255 lb)    SpO2 95%    BMI 42.43 kg/m2       Patient is status post MAC anesthesia for Procedure(s):  [address] [place] [address],[address]. Nausea/Vomiting: None    Postoperative hydration reviewed and adequate. Pain:  Pain Scale 1: Numeric (0 - 10) (08/27/18 1800)  Pain Intensity 1: 0 (08/27/18 1800)   Managed    Neurological Status:   Neuro (WDL): Within Defined Limits (08/27/18 1800)  Neuro  LUE Motor Response: Purposeful (08/27/18 1800)  LLE Motor Response: Purposeful (08/27/18 1800)  RUE Motor Response: Purposeful (08/27/18 1800)  RLE Motor Response: Purposeful (08/27/18 1800)   At baseline    Mental Status and Level of Consciousness: Arousable    Pulmonary Status:   O2 Device: Room air (08/27/18 1800)   Adequate oxygenation and airway patent    Complications related to anesthesia: None    Post-anesthesia assessment completed.  No concerns    Signed By: Tammi Decker MD     August 27, 2018

## 2018-08-27 NOTE — ANESTHESIA PREPROCEDURE EVALUATION
Anesthetic History     PONV          Review of Systems / Medical History  Patient summary reviewed, nursing notes reviewed and pertinent labs reviewed    Pulmonary        Sleep apnea    Asthma        Neuro/Psych   Within defined limits           Cardiovascular  Within defined limits                     GI/Hepatic/Renal     GERD           Endo/Other        Morbid obesity and arthritis     Other Findings            Physical Exam    Airway  Mallampati: II  TM Distance: > 6 cm  Neck ROM: normal range of motion   Mouth opening: Normal     Cardiovascular  Regular rate and rhythm,  S1 and S2 normal,  no murmur, click, rub, or gallop             Dental  No notable dental hx       Pulmonary  Breath sounds clear to auscultation               Abdominal  GI exam deferred       Other Findings            Anesthetic Plan    ASA: 3  Anesthesia type: general and MAC          Induction: Intravenous  Anesthetic plan and risks discussed with: Patient

## 2018-08-27 NOTE — DISCHARGE INSTRUCTIONS
ICE & ELEVATION for the next 48 hours  Wound Care: After Your Visit  Your Care Instructions  Taking good care of your wound at home will help it heal quickly and reduce your chance of infection. The doctor has checked you carefully, but problems can develop later. If you notice any problems or new symptoms, get medical treatment right away. Follow-up care is a key part of your treatment and safety. Be sure to make and go to all appointments, and call your doctor if you are having problems. It's also a good idea to know your test results and keep a list of the medicines you take. How can you care for yourself at home? · Clean the area with soap and water 2 times a day unless your doctor gives you different instructions. Don't use hydrogen peroxide or alcohol, which can slow healing. ¨ You may cover the wound with a thin layer of antibiotic ointment, such as bacitracin, and a nonstick bandage. ¨ Apply more ointment and replace the bandage as needed. · Take pain medicines exactly as directed. Some pain is normal with a wound, but do not ignore pain that is getting worse instead of better. You could have an infection. ¨ If the doctor gave you a prescription medicine for pain, take it as prescribed. ¨ If you are not taking a prescription pain medicine, ask your doctor if you can take an over-the-counter medicine. · Your doctor may have closed your wound with stitches (sutures), staples, or skin glue. ¨ If you have stitches, your doctor may remove them after several days to 2 weeks. Or you may have stitches that dissolve on their own. ¨ If you have staples, your doctor may remove them after 7 to 10 days. ¨ If your wound was closed with skin glue, the glue will wear off in a few days to 2 weeks. When should you call for help? Call your doctor now or seek immediate medical care if:  · You have signs of infection, such as:  ¨ Increased pain, swelling, warmth, or redness near the wound.   ¨ Red streaks leading from the wound. ¨ Pus draining from the wound. ¨ A fever. · You bleed so much from your incision that you soak one or more bandages over 2 to 4 hours. Watch closely for changes in your health, and be sure to contact your doctor if:  · The wound is not getting better each day. Where can you learn more? Go to [employer]  Enter M973 in the search box to learn more about \"Wound Care: After Your Visit. \"   © 6692-0602 [employer]. Care instructions adapted under license by [employer] (which disclaims liability or warranty for this information). This care instruction is for use with your licensed healthcare professional. If you have questions about a medical condition or this instruction, always ask your healthcare professional. [address] any warranty or liability for your use of this information. Content Version: 55.0.376283; Last Revised: April 23, 2012      ______________________________________________________________________    Anesthesia Discharge Instructions    After general anesthesia or intervenous sedation, for 24 hours or while taking prescription Narcotics:  · Limit your activities  · Do not drive or operate hazardous machinery  · If you have not urinated within 8 hours after discharge, please contact your surgeon on call. · Do not make important personal or business decisions  · Do not drink alcoholic beverages    Report the following to your surgeon:  · Excessive pain, swelling, redness or odor of or around the surgical area  · Temperature over 100.5 degrees  · Nausea and vomiting lasting longer than 4 hours or if unable to take medication  · Any signs of decreased circulation or nerve impairment to extremity:  Change in color, persistent numbness, tingling, coldness or increased pain.   · Any questions

## 2018-08-27 NOTE — PERIOP NOTES
Patient and family member verbalized understanding of all instructions. Patient preferred not to take Rx for Jenkins Minor . DR Usman Ponce notified and Rx for Jenkins Minor shredded. Inhalers x 3, glasses, cane and all belongings returned.

## 2018-08-30 NOTE — OP NOTES
[address] [place] [address] REPORT    Myra Combs  MR#: [id number]  : 1965  ACCOUNT #: [de-identified]   DATE OF SERVICE: 2018    PREOPERATIVE DIAGNOSIS:  Bilateral medial canthal webbing scars from dog bite. POSTOPERATIVE DIAGNOSES:  Bilateral medial canthal webbing scars from dog bite, facial pain syndrome. PROCEDURES PERFORMED:  1. Right complex facial/medial canthal repair with running Z-plasty:  1.5 x 3 cm. 2.  Complex left canthal repair with Z-plasty:  1 x 1.5 cm. SURGEON:  Delmi Hook MD    ANESTHESIA:  General endotracheal tube anesthesia. COMPLICATIONS:  No complications. ESTIMATED BLOOD LOSS:  3 mL. IMPLANTS:  No implant. SPECIMENS REMOVED:  No specimen. SURGICAL ASSISTANT:  none    INDICATIONS AND FINDINGS:  The patient had a dog bite injury, the dog's teeth clamping onto the bridge of the nose, breaking the nasal bones, the avulsion injury healing with bilateral webbing of the concave medial canthi, and hence indications. Part of motivation for procedure was chronic facial pain on the right side in particular related to nasal fracture component with neuromuscular injury versus neural injury for the sensory nerves emanating from the medial orbit, and hence indications. DETAILS OF PROCEDURE:  In the operating room, the patient was induced under conscious sedation anesthesia, though she became so disinhibited and uncontrollable that Anesthesia felt they had to proceed with general anesthesia. The web was marked out and a running Z-plasty marked out on the right, and a Z-plasty reconstruction marked out on the left. Lidocaine 1% with 1:100,000 epinephrine was used for local infiltration at both sites. On the right, a 15 blade was used to incise the web linearly marking out serial 60-degree running Z-plasties with each limb measuring approximately 6 mm.   The subcutaneous tissue was undermined with tenotomy scissors, identifying a web of scar tissue which was directly excised with tenotomy scissors. The periosteum over the medial, superior nasion and radix area was peeled from the bone with a [place], peeling the periosteum from the intended site of the fracture. The Z-plasties were then interposed, lengthening the scar and allowing the web to sit in the hollow or concavity of the canthus, closing with interrupted 5-0 plain gut sutures. On the left side, the Z-plasty was incised and undermined with tenotomy scissors, identifying a scar web. This was directly excised with tenotomy scissors from the orbicularis muscle. Z-plasty limbs measuring approximately 8 mm were then interposed, closing the flaps with interrupted 5-0 plain gut sutures. Antibiotic ointment was then applied and the patient handed over to Anesthesia for awakening and transfer to the recovery room.       MD KEYON Dudley / TANA  D: 08/29/2018 14:18     T: 08/29/2018 16:35  JOB #: [id number]  CC: Jose Ortega MD

## 2019-01-30 ENCOUNTER — TELEPHONE (OUTPATIENT)
Dept: SURGERY | Age: 54
End: 2019-01-30

## 2019-01-30 NOTE — TELEPHONE ENCOUNTER
Pt sees a pain management doctor for her back and the medication is hard on her stomach so her doctor wants a note from Dr. Yair Alex faxed over to her doctor saying its okay to take it. Pt is also wondering if its okay to fly to [place] in May, since she is having surgery on March 20th.

## 2019-01-31 NOTE — TELEPHONE ENCOUNTER
I called the patient back and I let her know that we could not provide a letter for her to take Celebrex as we do not want her taking it, I then let her know that we would approve for her to get on a plane 6 weeks post op following a revisional gastric bypass as she is 4-5 times at a higher risk for a complication. She then asked me could she be put on a waiting list should someone cancel their surgery prior to her surgery date and I told her we are short 1 surgeon and our remaining 2 surgeons are booked and there are no slots and she would still be [place]. She said she understood the risks post op as she has had a complication  Follow hip surgery. She has asked to be rescheduled for after her May trip, I told her I will inform our  and she will call her to reschedule.

## 2019-03-11 RX ORDER — PANTOPRAZOLE SODIUM 40 MG/1
TABLET, DELAYED RELEASE ORAL
Qty: 90 TAB | Refills: 0 | Status: SHIPPED | OUTPATIENT
Start: 2019-03-11 | End: 2019-04-30

## 2019-03-22 ENCOUNTER — HOSPITAL ENCOUNTER (OUTPATIENT)
Dept: GENERAL RADIOLOGY | Age: 54
Discharge: HOME | End: 2019-03-22
Attending: ORTHOPAEDIC SURGERY
Payer: COMMERCIAL

## 2019-03-22 DIAGNOSIS — M16.11 PRIMARY LOCALIZED OSTEOARTHROSIS OF RIGHT HIP: ICD-10-CM

## 2019-03-22 PROCEDURE — 74011000250 HC RX REV CODE- 250: Performed by: RADIOLOGY

## 2019-03-22 PROCEDURE — 20610 DRAIN/INJ JOINT/BURSA W/O US: CPT

## 2019-03-22 PROCEDURE — 74011250636 HC RX REV CODE- 250/636: Performed by: RADIOLOGY

## 2019-03-22 PROCEDURE — 74011636320 HC RX REV CODE- 636/320: Performed by: RADIOLOGY

## 2019-03-22 RX ORDER — BUPIVACAINE HYDROCHLORIDE 5 MG/ML
5 INJECTION, SOLUTION EPIDURAL; INTRACAUDAL
Status: COMPLETED | OUTPATIENT
Start: 2019-03-22 | End: 2019-03-22

## 2019-03-22 RX ORDER — TRIAMCINOLONE ACETONIDE 40 MG/ML
40 INJECTION, SUSPENSION INTRA-ARTICULAR; INTRAMUSCULAR
Status: COMPLETED | OUTPATIENT
Start: 2019-03-22 | End: 2019-03-22

## 2019-03-22 RX ORDER — LIDOCAINE HYDROCHLORIDE 10 MG/ML
10 INJECTION, SOLUTION EPIDURAL; INFILTRATION; INTRACAUDAL; PERINEURAL ONCE
Status: COMPLETED | OUTPATIENT
Start: 2019-03-22 | End: 2019-03-22

## 2019-03-22 RX ADMIN — BUPIVACAINE HYDROCHLORIDE 5 MG: 5 INJECTION, SOLUTION EPIDURAL; INTRACAUDAL; PERINEURAL at 15:25

## 2019-03-22 RX ADMIN — IOHEXOL 5 ML: 300 INJECTION, SOLUTION INTRAVENOUS at 15:27

## 2019-03-22 RX ADMIN — LIDOCAINE HYDROCHLORIDE 5 ML: 10 INJECTION, SOLUTION EPIDURAL; INFILTRATION; INTRACAUDAL; PERINEURAL at 15:27

## 2019-03-22 RX ADMIN — TRIAMCINOLONE ACETONIDE 40 MG: 40 INJECTION, SUSPENSION INTRA-ARTICULAR; INTRAMUSCULAR at 15:26

## 2019-04-30 ENCOUNTER — HOSPITAL ENCOUNTER (OUTPATIENT)
Dept: GENERAL RADIOLOGY | Age: 54
Discharge: HOME | End: 2019-04-30
Attending: SURGERY
Payer: MEDICARE

## 2019-04-30 ENCOUNTER — HOSPITAL ENCOUNTER (OUTPATIENT)
Dept: PREADMISSION TESTING | Age: 54
Discharge: HOME | End: 2019-04-30
Payer: COMMERCIAL

## 2019-04-30 VITALS
SYSTOLIC BLOOD PRESSURE: 139 MMHG | HEIGHT: 65 IN | BODY MASS INDEX: 45.48 KG/M2 | DIASTOLIC BLOOD PRESSURE: 81 MMHG | WEIGHT: 273 LBS | HEART RATE: 76 BPM | TEMPERATURE: 98 F

## 2019-04-30 LAB
ALBUMIN SERPL-MCNC: 3.7 G/DL (ref 3.5–5)
ALBUMIN/GLOB SERPL: 1.2 {RATIO} (ref 1.1–2.2)
ALP SERPL-CCNC: 82 U/L (ref 45–117)
ALT SERPL-CCNC: 19 U/L (ref 12–78)
ANION GAP SERPL CALC-SCNC: 6 MMOL/L (ref 5–15)
AST SERPL-CCNC: 19 U/L (ref 15–37)
BASOPHILS # BLD: 0.1 K/UL (ref 0–0.1)
BASOPHILS NFR BLD: 1 % (ref 0–1)
BILIRUB SERPL-MCNC: 0.6 MG/DL (ref 0.2–1)
BUN SERPL-MCNC: 8 MG/DL (ref 6–20)
BUN/CREAT SERPL: 11 (ref 12–20)
CALCIUM SERPL-MCNC: 8.6 MG/DL (ref 8.5–10.1)
CHLORIDE SERPL-SCNC: 110 MMOL/L (ref 97–108)
CO2 SERPL-SCNC: 27 MMOL/L (ref 21–32)
CREAT SERPL-MCNC: 0.75 MG/DL (ref 0.55–1.02)
DIFFERENTIAL METHOD BLD: NORMAL
EOSINOPHIL # BLD: 0.1 K/UL (ref 0–0.4)
EOSINOPHIL NFR BLD: 2 % (ref 0–7)
ERYTHROCYTE [DISTWIDTH] IN BLOOD BY AUTOMATED COUNT: 13.8 % (ref 11.5–14.5)
GLOBULIN SER CALC-MCNC: 3 G/DL (ref 2–4)
GLUCOSE SERPL-MCNC: 105 MG/DL (ref 65–100)
HCT VFR BLD AUTO: 41 % (ref 35–47)
HGB BLD-MCNC: 12.8 G/DL (ref 11.5–16)
IMM GRANULOCYTES # BLD AUTO: 0 K/UL (ref 0–0.04)
IMM GRANULOCYTES NFR BLD AUTO: 0 % (ref 0–0.5)
LYMPHOCYTES # BLD: 1.1 K/UL (ref 0.8–3.5)
LYMPHOCYTES NFR BLD: 27 % (ref 12–49)
MAGNESIUM SERPL-MCNC: 2.2 MG/DL (ref 1.6–2.4)
MCH RBC QN AUTO: 29.7 PG (ref 26–34)
MCHC RBC AUTO-ENTMCNC: 31.2 G/DL (ref 30–36.5)
MCV RBC AUTO: 95.1 FL (ref 80–99)
MONOCYTES # BLD: 0.4 K/UL (ref 0–1)
MONOCYTES NFR BLD: 10 % (ref 5–13)
NEUTS SEG # BLD: 2.5 K/UL (ref 1.8–8)
NEUTS SEG NFR BLD: 60 % (ref 32–75)
NRBC # BLD: 0 K/UL (ref 0–0.01)
NRBC BLD-RTO: 0 PER 100 WBC
PLATELET # BLD AUTO: 245 K/UL (ref 150–400)
PMV BLD AUTO: 10.5 FL (ref 8.9–12.9)
POTASSIUM SERPL-SCNC: 3.8 MMOL/L (ref 3.5–5.1)
PROT SERPL-MCNC: 6.7 G/DL (ref 6.4–8.2)
RBC # BLD AUTO: 4.31 M/UL (ref 3.8–5.2)
SODIUM SERPL-SCNC: 143 MMOL/L (ref 136–145)
WBC # BLD AUTO: 4.2 K/UL (ref 3.6–11)

## 2019-04-30 PROCEDURE — 71046 X-RAY EXAM CHEST 2 VIEWS: CPT

## 2019-04-30 PROCEDURE — 80053 COMPREHEN METABOLIC PANEL: CPT

## 2019-04-30 PROCEDURE — 85025 COMPLETE CBC W/AUTO DIFF WBC: CPT

## 2019-04-30 PROCEDURE — 83735 ASSAY OF MAGNESIUM: CPT

## 2019-04-30 RX ORDER — ERGOCALCIFEROL 1.25 MG/1
50000 CAPSULE ORAL
Status: ON HOLD | COMMUNITY
End: 2022-10-27

## 2019-04-30 RX ORDER — FENTANYL 25 UG/1
1 PATCH TRANSDERMAL
Status: ON HOLD | COMMUNITY
End: 2022-10-27

## 2019-04-30 RX ORDER — TOPIRAMATE 50 MG/1
50 TABLET, FILM COATED ORAL
COMMUNITY

## 2019-04-30 RX ORDER — LANOLIN ALCOHOL/MO/W.PET/CERES
2 CREAM (GRAM) TOPICAL
COMMUNITY

## 2019-05-09 ENCOUNTER — OFFICE VISIT (OUTPATIENT)
Dept: SURGERY | Age: 54
End: 2019-05-09

## 2019-05-09 VITALS
HEART RATE: 77 BPM | HEIGHT: 65 IN | RESPIRATION RATE: 18 BRPM | TEMPERATURE: 97.5 F | OXYGEN SATURATION: 98 % | DIASTOLIC BLOOD PRESSURE: 80 MMHG | SYSTOLIC BLOOD PRESSURE: 127 MMHG | BODY MASS INDEX: 45.98 KG/M2 | WEIGHT: 276 LBS

## 2019-05-09 DIAGNOSIS — M54.40 CHRONIC MIDLINE LOW BACK PAIN WITH SCIATICA, SCIATICA LATERALITY UNSPECIFIED: ICD-10-CM

## 2019-05-09 DIAGNOSIS — G47.33 OSA TREATED WITH BIPAP: ICD-10-CM

## 2019-05-09 DIAGNOSIS — K21.9 GASTROESOPHAGEAL REFLUX DISEASE, ESOPHAGITIS PRESENCE NOT SPECIFIED: ICD-10-CM

## 2019-05-09 DIAGNOSIS — I10 ESSENTIAL HYPERTENSION: ICD-10-CM

## 2019-05-09 DIAGNOSIS — F11.90 CHRONIC, CONTINUOUS USE OF OPIOIDS: ICD-10-CM

## 2019-05-09 DIAGNOSIS — G71.00 MUSCULAR DYSTROPHY (HCC): ICD-10-CM

## 2019-05-09 DIAGNOSIS — E66.01 MORBID OBESITY WITH BMI OF 45.0-49.9, ADULT (HCC): Primary | ICD-10-CM

## 2019-05-09 DIAGNOSIS — D86.9 SARCOIDOSIS: ICD-10-CM

## 2019-05-09 DIAGNOSIS — G47.33 OSA (OBSTRUCTIVE SLEEP APNEA): ICD-10-CM

## 2019-05-09 DIAGNOSIS — G89.29 CHRONIC MIDLINE LOW BACK PAIN WITH SCIATICA, SCIATICA LATERALITY UNSPECIFIED: ICD-10-CM

## 2019-05-09 DIAGNOSIS — Z79.891 OPIOID USE AGREEMENT EXISTS: ICD-10-CM

## 2019-05-09 RX ORDER — POLYETHYLENE GLYCOL 3350 17 G/17G
17 POWDER, FOR SOLUTION ORAL DAILY
Qty: 1530 G | Refills: 2 | Status: SHIPPED | OUTPATIENT
Start: 2019-05-09

## 2019-05-09 RX ORDER — ONDANSETRON 4 MG/1
4 TABLET, ORALLY DISINTEGRATING ORAL
Qty: 20 TAB | Refills: 0 | Status: SHIPPED | OUTPATIENT
Start: 2019-05-09 | End: 2019-11-20

## 2019-05-09 NOTE — PROGRESS NOTES
1. Have you been to the ER, urgent care clinic since your last visit? Hospitalized since your last visit? No    2. Have you seen or consulted any other health care providers outside of the [address] [place] [address] since your last visit? Include any pap smears or colon screening.  No

## 2019-05-09 NOTE — PATIENT INSTRUCTIONS
Make your after surgery appointments for 1, 2,4 and 6 weeks     Stay on your vitamins     Start the pre op diet

## 2019-05-09 NOTE — PROGRESS NOTES
HISTORY OF PRESENT ILLNESS  Lubna Palacios is a 49 y.o. female. HPI   Chief Complaint   Patient presents with    Surg H&P     scheduled for lap revision of bypass on 5/23/19   scheduled for revision with Dr. Isiah Hall for weight gain and GERD:    Anatomically has a 4 cm spherical pouch with dilated GJ outlet, rapid transit of food bolus and elongated blind afferent limb measured at 6 cm-  significant restrictive symptoms when her gastric bypass anatomy would suggest restrictive failure (enlarged gastric pouch, dilated gastrojejunostomy). She also has very severe GERD with exacerbations of asthma, regurgitation, inability to lay flat to sleep due to regurgitation.   It now seems likely these issues are related to achalasia  Patient Active Problem List   Diagnosis Code    Sarcoidosis D86.9    Cramps, muscle, general R25.2    Bilateral low back pain without sciatica M54.5    Primary localized osteoarthritis of left hip M16.12    Obesity, morbid ([address] E66.01    GERD (gastroesophageal reflux disease) K21.9    YUE treated with BiPAP G47.33    Chronic, continuous use of opioids F11.90     Past Medical History:   Diagnosis Date    Arthritis     Asthma     Cancer ([address] 2012    LEFT BREAST    Chronic pain     LOWER BACK, SCIATICA, RIGHT HIP PAIN    Fall     GERD (gastroesophageal reflux disease)     Headache     Hypertension     Morbid obesity ([address]     Muscular dystrophy ([address]     Nausea & vomiting     USED SCOPALAMINE PATCH IN PAST    Pulmonary hypertension ([address]     Sarcoidosis     IN LUNGS  RESOLVED      Sleep apnea     USES BIPAP  PT DOES NOT USE DUE TO FACIAL INJURY     Past Surgical History:   Procedure Laterality Date    CHEST SURGERY PROCEDURE UNLISTED  2000    BIOPSY OUTER LUNG    HX BREAST RECONSTRUCTION  2012    HX BREAST RECONSTRUCTION  2012     3 SURGERIES TO REMOVE DEAD TISSUE    HX BREAST RECONSTRUCTION  2013 2015    LATISAMUS FLAP, IMPLANTS FAT GRAFTING     HX BREAST RECONSTRUCTION 15 TOTAL SURGERIES    HX CERVICAL FUSION  2012    C5-C6 hardware    HX CHOLECYSTECTOMY  1998    HX COLPOSCOPY      HX GASTRIC BYPASS  2003    HX GYN  2001    CERVIX CONE PROCEDURE    HX GYN  2008    EUTERO ABLATION    HX HEENT      HX HEENT  2017    DOG BITE TO FACE     HX HEENT  09/15/2017    DOG BITE TO FACE     HX HEENT Bilateral 2018    EYE /NOSE FROM DOG BITE    HX HYSTERECTOMY  2014    HX HYSTEROSCOPY WITH ENDOMETRIAL ABLATION  2008    HX KNEE ARTHROSCOPY Right 2016    HX LUMBAR FUSION  2011    L5-S1 hardware    HX MASTECTOMY Bilateral 2012    HX ORTHOPAEDIC Right 2010    BONE GRAFT SCAFOID BONE WRIST    HX ORTHOPAEDIC Left 2016    THR    HX ORTHOPAEDIC Right 2015    MUSCLE BIOPSY -MADD MUSCULAR DYSTROPHY     HX OTHER SURGICAL  2013    PAIN STIMULATOR    HX OTHER SURGICAL  2015    MUSCLE BIOPSY     HX RHINOPLASTY  5834,7615    X 2    HX TONSILLECTOMY  1982    HX UROLOGICAL  2018    CYSTOSCOPY WITH BLADDER BIOPSY     IR BRONCHOSCOPY      NEUROLOGICAL PROCEDURE UNLISTED  2017    REPLACE RELOCATE BATTERY FOR STIMULATOR     NEUROLOGICAL PROCEDURE UNLISTED      PAIN STIMULATOR IMPLANTED T8-T9     Social History     Socioeconomic History    Marital status:      Spouse name: Not on file    Number of children: Not on file    Years of education: Not on file    Highest education level: Not on file   Occupational History    Occupation: disabled      Comment:    Social Needs    Financial resource strain: Not on file    Food insecurity:     Worry: Not on file     Inability: Not on file   [employer] needs:     Medical: Not on file     Non-medical: Not on file   Tobacco Use    Smoking status: Former Smoker     Packs/day: 0.50     Years: 3.00     Pack years: 1.50     Types: Cigarettes     Last attempt to quit: 1988     Years since quittin.2    Smokeless tobacco: Never Used   Substance and Sexual Activity    Alcohol use:  No  Drug use: Yes     Types: Prescription    Sexual activity: Yes     Birth control/protection: Surgical   Lifestyle    Physical activity:     Days per week: Not on file     Minutes per session: Not on file    Stress: Not on file   Relationships    Social connections:     Talks on phone: Not on file     Gets together: Not on file     Attends Baptism service: Not on file     Active member of club or organization: Not on file     Attends meetings of clubs or organizations: Not on file     Relationship status: Not on file    Intimate partner violence:     Fear of current or ex partner: Not on file     Emotionally abused: Not on file     Physically abused: Not on file     Forced sexual activity: Not on file   Other Topics Concern    Not on file   Social History Narrative    Disabled since failed back surgery in 2011     In the past worked as an      In the home with spouse and 15year old daughter     [de-identified] year old mother with dementia moving in      Family History   Problem Relation Age of Onset    Heart Disease Mother     Asthma Mother     Diabetes Mother     COPD Mother     Anesth Problems Mother         SEVERE PONV    Cancer Sister         MELANOMA    Asthma Brother     Cancer Father         BREAST    Cancer Sister         BREAST    Heart Attack Brother     Heart Disease Brother     Anesth Problems Brother         VOMITTING    Cancer Paternal Grandmother         KIDNEY       Current Outpatient Medications:     ondansetron (ZOFRAN ODT) 4 mg disintegrating tablet, Take 1 Tab by mouth every eight (8) hours as needed for Nausea. AFTER SURGERY, Disp: 20 Tab, Rfl: 0    polyethylene glycol (MIRALAX) 17 gram/dose powder, Take 17 g by mouth daily. Indications: constipation, Disp: 1530 g, Rfl: 2    fentaNYL (DURAGESIC) 25 mcg/hr PATCH, 1 Patch by TransDERmal route every seventy-two (72) hours. , Disp: , Rfl:     ergocalciferol (VITAMIN D2) 50,000 unit capsule, Take 50,000 Units by mouth every seven (7) days. Indications: ON THURSDAYS, Disp: , Rfl:     topiramate (TOPAMAX) 50 mg tablet, Take 50 mg by mouth nightly., Disp: , Rfl:     calcium citrate-vitamin d3 (CITRACAL+D) 315-200 mg-unit tab, Take 2 Tabs by mouth daily (with breakfast). , Disp: , Rfl:     pantoprazole (PROTONIX) 40 mg tablet, Take 40 mg by mouth nightly., Disp: , Rfl:     HYDROcodone-acetaminophen (NORCO) 5-325 mg per tablet, Take 2 Tabs by mouth every six (6) hours as needed for Pain for up to 30 doses. Max Daily Amount: 8 Tabs. Indications: Pain, Disp: 30 Tab, Rfl: 0    bacitracin zinc (BACITRACIN) ointment, Apply  to affected area two (2) times a day., Disp: 15 g, Rfl: 0    gabapentin (NEURONTIN) 300 mg capsule, Take 900 mg by mouth nightly., Disp: , Rfl:     alendronate (FOSAMAX) 70 mg tablet, Take  by mouth every seven (7) days. SUNDAYS, Disp: , Rfl:     ferrous sulfate 325 mg (65 mg iron) tablet, Take  by mouth Daily (before breakfast). , Disp: , Rfl:     acetaminophen (TYLENOL) 325 mg tablet, Take 2 Tabs by mouth every six (6) hours. Indications: PAIN (Patient taking differently: Take 650 mg by mouth every six (6) hours as needed. Indications: Pain), Disp: 100 Tab, Rfl: 0    HYDROmorphone (DILAUDID) 4 mg tablet, Take 1 Tab by mouth every four (4) hours as needed. Max Daily Amount: 24 mg. Indications: PAIN, Not to be filled. This prescription is for instructional purposes only. Patient has supply at home., Disp: 120 Tab, Rfl: 0    tiotropium (SPIRIVA) 18 mcg inhalation capsule, Take 1 Cap by inhalation daily. , Disp: , Rfl:     cetirizine (ZYRTEC) 10 mg tablet, Take 10 mg by mouth nightly., Disp: , Rfl:     calcium carbonate (TUMS) 200 mg calcium (500 mg) chew, Take 3 Tabs by mouth daily as needed. , Disp: , Rfl:     gabapentin (NEURONTIN) 300 mg capsule, 300 mg two (2) times a day.  7AM,  12NN, Disp: , Rfl: 1    DULoxetine (CYMBALTA) 60 mg capsule, 60 mg two (2) times a day., Disp: , Rfl:     traZODone (DESYREL) 50 mg tablet, Take 50 mg by mouth nightly., Disp: , Rfl: 11    PROAIR HFA 90 mcg/actuation inhaler, Take 2 Puffs by inhalation two (2) times daily as needed. , Disp: , Rfl: 3    NASONEX 50 mcg/actuation nasal spray, 2 Sprays by Both Nostrils route daily. , Disp: , Rfl: 3    montelukast (SINGULAIR) 10 mg tablet, 10 mg nightly., Disp: , Rfl: 3    albuterol (PROVENTIL VENTOLIN) 2.5 mg /3 mL (0.083 %) nebulizer solution, as needed. , Disp: , Rfl: 3    cloNIDine HCl (CATAPRES) 0.1 mg tablet, 0.1 mg three (3) times daily. , Disp: , Rfl: 2    cyanocobalamin 1,000 mcg tablet, Take 1,000 mcg by mouth daily. , Disp: , Rfl:     FOLIC ACID/MULTIVIT-MIN/LUTEIN (CENTRUM SILVER PO), Take 1 Tab by mouth daily. , Disp: , Rfl:   Allergies   Allergen Reactions    Levaquin [Levofloxacin] Anaphylaxis    Bactrim [Sulfamethoprim] Itching    Morphine Other (comments)     PT STATED DOES NOT WORK ON HER     PCP Cici Rivera MD    Review of Systems   Constitutional: Positive for malaise/fatigue. HENT: Negative for congestion, hearing loss, sore throat and tinnitus. Eyes: Positive for blurred vision (glasses). Respiratory: Positive for shortness of breath and wheezing (hx asthma well controlled now and has nebulizer at home last used in February ). Negative for cough, hemoptysis and sputum production. Has BiPap and uses only prn due to GERD    Cardiovascular: Positive for leg swelling (prn). Negative for chest pain and palpitations. Gastrointestinal: Positive for heartburn and vomiting (rare and usually reflux ). Negative for abdominal pain, blood in stool, constipation, diarrhea, melena and nausea. Avoids seafood (causes nausea) and \"careful\" with sugar    Genitourinary: Negative for dysuria, flank pain, frequency, hematuria and urgency. Musculoskeletal: Positive for back pain (chronic with sciatica ), joint pain (right hip needs replacement ) and myalgias. Negative for falls and neck pain (hx fusion ). Uses cane most of the time   Uses scooter prn at  store     Has pain stimulator that is on will turn off day of surgery    Skin:        Scratches from dog on arms    Neurological: Positive for tingling (legs and feet ). Negative for dizziness, seizures, loss of consciousness and headaches. Endo/Heme/Allergies:        Good with vitamins      Psychiatric/Behavioral: Positive for depression. The patient is not nervous/anxious. Pain mgmt Dr. Vandana Palomino       Physical Exam   Constitutional: She is oriented to person, place, and time. /80 (BP 1 Location: Left arm, BP Patient Position: Sitting)   Pulse 77   Temp 97.5 °F (36.4 °C) (Oral)   Resp 18   Ht 5' 5\" (1.651 m)   Wt 276 lb (125.2 kg)   SpO2 98%   BMI 45.93 kg/m²   White female with obesity    HENT:   Head: Normocephalic. Mouth/Throat: No oropharyngeal exudate. Poor dentition and broken decayed teeth    Eyes: Pupils are equal, round, and reactive to light. No scleral icterus. Neck: Normal range of motion. Neck supple. No JVD present. No tracheal deviation present. No thyromegaly present. Cardiovascular: Normal rate and regular rhythm. Pulmonary/Chest: Effort normal and breath sounds normal. No respiratory distress. She has no wheezes. She has no rales. Abdominal: Soft. Bowel sounds are normal. She exhibits no mass. There is no tenderness. Obese    Musculoskeletal: She exhibits edema (1+ LE ). Pain stimulator left flank   Ambulating with cane    Lymphadenopathy:     She has no cervical adenopathy. Neurological: She is alert and oriented to person, place, and time. Skin: Skin is warm and dry. Psychiatric: She has a normal mood and affect.      8/21/17   [facility]- Gastroduodenoscopy (EGD) Procedure Note  Procedure: Endoscopic Gastroduodenoscopy --diagnostic  Indication: weight gain after gastric bypass surgery  Pre-operative Diagnosis: see indication above  Post-operative Diagnosis: see findings below  : Linda Beck. Leticia Singh MD  Referring Provider: Alexandra Penn MD,  Alanna Inman MD  Anesthesia/Sedation:  MAC anesthesia     Procedure Details      After informed consent was obtained for the procedure, with all risks and benefits of procedure explained the patient was taken to the endoscopy suite and placed in the left lateral decubitus position. Following sequential administration of sedation as per above, the endoscope was inserted into the mouth and advanced under direct vision to distal Amrik limb. A careful inspection was made as the gastroscope was withdrawn, including a retroflexed view of the proximal stomach; findings and interventions are described below.       Findings:   A normal Z line was seen at 40 cm. The gastric pouch was 4 cm in length was spherical in shape. There was some retained food in the gastric pouch. Retroflexion was performed within the pouch and was normal appearing. A widely patent GJ anastomosis was located at 44 cm. The retroflexed estimated diameter of the GJ anastomosis was 18-20 mm. No marginal ulceration was seen. The blind end of the jejunal side of the anastomosis was located at 50 cm. The scope was advanced to the distal Amrik limb. No bile reflux was seen. The JJ anastomosis was not reached.        Therapies: none   Specimens: none       EBL: None    Complications:   None; patient tolerated the procedure well. Impression:    Status post RYGB with findings described as above      1/28/19    [facility]   Lower esophageal sphincter study     LESP =  69.2 mmHg  incomplete relaxation                                 Lower esophageal body study      Peristaltic contractions=  100%  Amplitudes  111  mmHg                                         Upper Esophageal body study      UESP=  0.5 mmHg                                     Complications:   None; patient tolerated the procedure well.            Impression:    EGJ outflow obstruction      Recommendations:  EUS with Dr. Leticia Singh     Signed By: Víctor Levine Stacey No MD      1/28/2018  9:36 AM     2/20/19  Endoscopic Ultrasound     Procedure Type: Endoscopic Ultrasound     Indications: esophago-gastric junction outflow obstruction, history of gastric bypass     Pre-operative Diagnosis: see indication above     Post-operative Diagnosis:  See findings below     : Kevin Hinton. Jack Rosales MD     Referring Provider:      Maurizio Mcfarland MD, -Kaylen Asher MD     Anethesia/Sedation:  MAC anesthesia Propofol      Procedure Details      After informed consent was obtained for the procedure, with all risks and benefits of procedure explained the patient was taken to the endoscopy suite and placed in the left lateral decubitus position. Following sequential administration of sedation as per above, an EGD was performed. Findings are listed below. Next, the radial echoendoscope was inserted into the mouth and advanced under direct vision to the gastric pouch.     Findings:      Endoscopic:              A normal Z line was seen at 40 cm. The gastric pouch was 4 cm in length was spherical in shape. Retroflexion was performed within the pouch and was normal appearing. A widely patent GJ anastomosis was located at 44 cm. The retroflexed estimated diameter of the GJ anastomosis was 18-20 mm. No marginal ulceration was seen. There was suture material visible on the jejunal side of the anastomosis. The blind end of the jejunal side of the anastomosis was located at 50 cm. The scope was advanced to the distal Amrik limb. No bile reflux was seen. The JJ anastomosis was not reached. Ultrasound:              Esophagus: the esophageal wall layers were normal appearing. No mass or infiltrating lesion was appreciated.    Specimen Removed:  none   Complications: None.    EBL:  None.   Interventions: see above   Impression:  1. EGJ outflow obstruction  2. Normal appearing esophageal wall layers  3. History of gastric bypass         ASSESSMENT and PLAN    ICD-10-CM ICD-9-CM    1.  Morbid obesity with BMI of 45.0-49.9, adult ([facility] E66.01 278.01     Z68.42 V85.42    2. Gastroesophageal reflux disease, esophagitis presence not specified K21.9 530.81    3. Essential hypertension I10 401.9    4. YUE (obstructive sleep apnea) G47.33 327.23    5. Muscular dystrophy ([facility] G71.00 359.1    6. Chronic midline low back pain with sciatica, sciatica laterality unspecified M54.40 724.2     G89.29 724.3      338.29    7. Opioid use agreement exists Z02.89 V68.89    8. YUE treated with BiPAP G47.33 327.23    9. Chronic, continuous use of opioids F11.90 305.51    10. Sarcoidosis D86.9 135        1. Morbid obesity:  GERD following gastric bypass; no improvement with medical management. She has extremely long afferent segment of Amrik limb which, along with size of gastric pouch (increased parietal cell mass) are resulting in GERD/regurgitation. Her asthma is likewise being exacerbated by this chronic reflux. I do not believe she has achalasia and I believe any myotomy may worsen her GERD symptoms. Recommended laparoscopic partial gastrectomy with resection of afferent portion of Amrik limb with upper endoscopy. This procedure will decrease parietal cell mass and by resecting the afferent limb, the \"reservoir effect\" af an elongated afferent limb will be eliminated. Scheduled for surgery  on 5/23/19 with Dr. Chacon Art protocol reviewed:  Acetaminophen 1000 mg at noon and 8 pm day before surgery and may have clear liquids (no caffeine, no ETOH, no carbonation and calorie free) until 2 hours prior to surgery   Preadmission testing results reviewed face to face with patient  Post operative prescriptions sent to pharmacy on file for AFTER surgery: continue pantoprazole 40 mg every day , then reassess need; Zofran 4 mg ODT #20 no refills for post op nausea;  Lovenox 40 mg SQ every day x 14 days for DVT prophylaxis   Post op pain management:  Gabapentin she will continue her regimen and she has made her pain mgmt provider aware of her surgery. Should resume fentanyl patch immediately post op and the Dilaudid baseline dose, adding percocet for breakthrough abdominal support / splinting; heating pad prn   Started on liver shrinking diet and Bariatric vitamins   Reviewed post operative diet, restrictions, follow up and medications  Post operative 1, 2, 4 and 6  week appointments made  Reviewed educational materials and book    2. YUE bring BiPap and encouraged her to try and be more complaint with use   3. HTN continue clonidine and will monitor post op   4. GERD continue pantoprazole post op   5. Chronic back pain - resume pain mgmt regimen post op and will bring device to turn off pain stimulator pre op and then turn back on post operatively; has contract with Dr. Mohit Silva pain mgmt   6. Sarcoidosis - no exaccerbation at present will follow up with rheumatology post op   7. Muscular dystrophy stable and recommend PT post op and possible for home       [address] verbalized understanding and questions were answered to the best of my knowledge and ability. surgery educational materials were provided.     41 minutes spent in face to face with patient

## 2019-05-10 PROBLEM — K21.9 GERD (GASTROESOPHAGEAL REFLUX DISEASE): Status: ACTIVE | Noted: 2019-05-10

## 2019-05-10 PROBLEM — G47.33 OSA TREATED WITH BIPAP: Status: ACTIVE | Noted: 2019-05-10

## 2019-05-10 PROBLEM — F11.90 CHRONIC, CONTINUOUS USE OF OPIOIDS: Status: ACTIVE | Noted: 2019-05-10

## 2019-05-10 RX ORDER — ENOXAPARIN SODIUM 100 MG/ML
40 INJECTION SUBCUTANEOUS DAILY
Qty: 14 SYRINGE | Refills: 0 | Status: SHIPPED | OUTPATIENT
Start: 2019-05-10 | End: 2019-08-19

## 2019-05-14 ENCOUNTER — OFFICE VISIT (OUTPATIENT)
Dept: SURGERY | Age: 54
End: 2019-05-14

## 2019-05-14 VITALS
HEIGHT: 65 IN | BODY MASS INDEX: 43.82 KG/M2 | RESPIRATION RATE: 20 BRPM | TEMPERATURE: 98 F | OXYGEN SATURATION: 98 % | HEART RATE: 113 BPM | SYSTOLIC BLOOD PRESSURE: 140 MMHG | WEIGHT: 263 LBS | DIASTOLIC BLOOD PRESSURE: 80 MMHG

## 2019-05-14 DIAGNOSIS — E66.01 OBESITY, MORBID (HCC): Primary | ICD-10-CM

## 2019-05-14 DIAGNOSIS — G89.29 CHRONIC BILATERAL LOW BACK PAIN WITHOUT SCIATICA: ICD-10-CM

## 2019-05-14 DIAGNOSIS — G47.33 OSA TREATED WITH BIPAP: ICD-10-CM

## 2019-05-14 DIAGNOSIS — K21.9 GASTROESOPHAGEAL REFLUX DISEASE WITHOUT ESOPHAGITIS: ICD-10-CM

## 2019-05-14 DIAGNOSIS — M54.50 CHRONIC BILATERAL LOW BACK PAIN WITHOUT SCIATICA: ICD-10-CM

## 2019-05-14 NOTE — PROGRESS NOTES
Subjective: The patient is a 49 y.o. obese female scheduled for revisional bariatric surgery on 5/23. Body mass index is 43.77 kg/m². Patrick Acosta has tried multiple diets in her  lifetime most recently trying the pre-op diet during which she was able to lose small amounts of weight thus far (10 lbs).     Bariatric comorbidities present are   Past Medical History:   Diagnosis Date    Arthritis     Asthma     Cancer ([address] 2012    LEFT BREAST    Chronic pain     LOWER BACK, SCIATICA, RIGHT HIP PAIN    Fall     GERD (gastroesophageal reflux disease)     Headache     Hypertension     Morbid obesity ([address]     Muscular dystrophy ([address]     Nausea & vomiting     USED SCOPALAMINE PATCH IN PAST    Pulmonary hypertension ([address]     Sarcoidosis     IN LUNGS  RESOLVED      Sleep apnea     USES BIPAP  PT DOES NOT USE DUE TO FACIAL INJURY       Patient Active Problem List    Diagnosis Date Noted    GERD (gastroesophageal reflux disease) 05/10/2019    YUE treated with BiPAP 05/10/2019    Chronic, continuous use of opioids 05/10/2019    Obesity, morbid ([address] 03/05/2018    Primary localized osteoarthritis of left hip 02/26/2016    Cramps, muscle, general 06/19/2015    Bilateral low back pain without sciatica 06/19/2015    Sarcoidosis      Past Medical History:   Diagnosis Date    Arthritis     Asthma     Cancer ([address] 2012    LEFT BREAST    Chronic pain     LOWER BACK, SCIATICA, RIGHT HIP PAIN    Fall     GERD (gastroesophageal reflux disease)     Headache     Hypertension     Morbid obesity ([address]     Muscular dystrophy ([address]     Nausea & vomiting     USED SCOPALAMINE PATCH IN PAST    Pulmonary hypertension ([address]     Sarcoidosis     IN LUNGS  RESOLVED      Sleep apnea     USES BIPAP  PT DOES NOT USE DUE TO FACIAL INJURY      Past Surgical History:   Procedure Laterality Date    CHEST SURGERY PROCEDURE UNLISTED  2000    BIOPSY OUTER LUNG    HX BREAST RECONSTRUCTION  2012    HX BREAST RECONSTRUCTION  2012     3 SURGERIES TO REMOVE DEAD TISSUE    HX BREAST RECONSTRUCTION  2013    LATISAMUS FLAP, IMPLANTS FAT GRAFTING     HX BREAST RECONSTRUCTION      13 TOTAL SURGERIES    HX CERVICAL FUSION  2012    C5-C6 hardware    HX CHOLECYSTECTOMY  1998    HX COLPOSCOPY      HX GASTRIC BYPASS      HX GYN  2001    CERVIX CONE PROCEDURE    HX GYN  2008    EUTERO ABLATION    HX HEENT      HX HEENT  2017    DOG BITE TO FACE     HX HEENT  09/15/2017    DOG BITE TO FACE     HX HEENT Bilateral 2018    EYE /NOSE FROM DOG BITE    HX HYSTERECTOMY  2014    HX HYSTEROSCOPY WITH ENDOMETRIAL ABLATION      HX KNEE ARTHROSCOPY Right 2016    HX LUMBAR FUSION  2011    L5-S1 hardware    HX MASTECTOMY Bilateral 2012    HX ORTHOPAEDIC Right 2010    BONE GRAFT SCAFOID BONE WRIST    HX ORTHOPAEDIC Left 2016    THR    HX ORTHOPAEDIC Right 2015    MUSCLE BIOPSY -MADD MUSCULAR DYSTROPHY     HX OTHER SURGICAL  2013    PAIN STIMULATOR    HX OTHER SURGICAL  2015    MUSCLE BIOPSY     HX RHINOPLASTY  6319,4311    X 2    HX TONSILLECTOMY  1982    HX UROLOGICAL  2018    CYSTOSCOPY WITH BLADDER BIOPSY     IR BRONCHOSCOPY      NEUROLOGICAL PROCEDURE UNLISTED  2017    REPLACE RELOCATE BATTERY FOR STIMULATOR     NEUROLOGICAL PROCEDURE UNLISTED      PAIN STIMULATOR IMPLANTED T8-T9      Social History     Tobacco Use    Smoking status: Former Smoker     Packs/day: 0.50     Years: 3.00     Pack years: 1.50     Types: Cigarettes     Last attempt to quit: 1988     Years since quittin.2    Smokeless tobacco: Never Used   Substance Use Topics    Alcohol use: No      Family History   Problem Relation Age of Onset    Heart Disease Mother     Asthma Mother     Diabetes Mother     COPD Mother     Anesth Problems Mother         SEVERE PONV    Cancer Sister         MELANOMA    Asthma Brother     Cancer Father         BREAST    Cancer Sister         BREAST    Heart Attack Brother     Heart Disease Brother     Anesth Problems Brother         VOMITTING    Cancer Paternal Grandmother         KIDNEY      Prior to Admission medications    Medication Sig Start Date End Date Taking? Authorizing Provider   fentaNYL (DURAGESIC) 25 mcg/hr PATCH 1 Patch by TransDERmal route every seventy-two (72) hours. Yes Provider, Historical   ergocalciferol (VITAMIN D2) 50,000 unit capsule Take 50,000 Units by mouth every seven (7) days. Indications: ON THURSDAYS   Yes Provider, Historical   topiramate (TOPAMAX) 50 mg tablet Take 50 mg by mouth nightly. Yes Provider, Historical   pantoprazole (PROTONIX) 40 mg tablet Take 40 mg by mouth nightly. Yes Provider, Historical   bacitracin zinc (BACITRACIN) ointment Apply  to affected area two (2) times a day. 8/27/18  Yes Anuja Ramsey MD   gabapentin (NEURONTIN) 300 mg capsule Take 900 mg by mouth nightly. Yes Provider, Historical   alendronate (FOSAMAX) 70 mg tablet Take  by mouth every seven (7) days. SUNDAYS   Yes Provider, Historical   ferrous sulfate 325 mg (65 mg iron) tablet Take  by mouth Daily (before breakfast). Yes Provider, Historical   acetaminophen (TYLENOL) 325 mg tablet Take 2 Tabs by mouth every six (6) hours. Indications: PAIN  Patient taking differently: Take 650 mg by mouth every six (6) hours as needed. Indications: Pain 3/15/16  Yes Skip Johansen MD   HYDROmorphone (DILAUDID) 4 mg tablet Take 1 Tab by mouth every four (4) hours as needed. Max Daily Amount: 24 mg. Indications: PAIN, Not to be filled. This prescription is for instructional purposes only. Patient has supply at home. 3/15/16  Yes Skip Johansen MD   tiotropium [facility]) 18 mcg inhalation capsule Take 1 Cap by inhalation daily. Yes Provider, Historical   cetirizine (ZYRTEC) 10 mg tablet Take 10 mg by mouth nightly. Yes Provider, Historical   calcium carbonate (TUMS) 200 mg calcium (500 mg) chew Take 3 Tabs by mouth daily as needed.    Yes Provider, Historical   gabapentin (NEURONTIN) 300 mg capsule 300 mg two (2) times a day. 7AM,  12NN 5/26/15  Yes Provider, Historical   DULoxetine (CYMBALTA) 60 mg capsule 60 mg two (2) times a day. 6/16/15  Yes Provider, Historical   traZODone (DESYREL) 50 mg tablet Take 50 mg by mouth nightly. 4/11/15  Yes Provider, Historical   NASONEX 50 mcg/actuation nasal spray 2 Sprays by Both Nostrils route daily. 5/28/15  Yes Provider, Historical   montelukast (SINGULAIR) 10 mg tablet 10 mg nightly. 5/28/15  Yes Provider, Historical   albuterol (PROVENTIL VENTOLIN) 2.5 mg /3 mL (0.083 %) nebulizer solution as needed. 5/15/15  Yes Provider, Historical   cloNIDine HCl (CATAPRES) 0.1 mg tablet 0.1 mg three (3) times daily. 6/9/15  Yes Provider, Historical   cyanocobalamin 1,000 mcg tablet Take 1,000 mcg by mouth daily. Yes Provider, Historical   FOLIC ACID/MULTIVIT-MIN/LUTEIN (CENTRUM SILVER PO) Take 1 Tab by mouth daily. Yes Provider, Historical   enoxaparin (LOVENOX) 40 mg/0.4 mL 0.4 mL by SubCUTAneous route daily. AFTER SURGERY  Indications: Deep Vein Thrombosis Prevention 5/10/19   Fran Espinosa NP   ondansetron (ZOFRAN ODT) 4 mg disintegrating tablet Take 1 Tab by mouth every eight (8) hours as needed for Nausea. AFTER SURGERY 5/9/19   Fran Espinosa NP   polyethylene glycol [facility]) 17 gram/dose powder Take 17 g by mouth daily. Indications: constipation 5/9/19   Fran Espinosa NP   calcium citrate-vitamin d3 (CITRACAL+D) 315-200 mg-unit tab Take 2 Tabs by mouth daily (with breakfast). Provider, Historical   HYDROcodone-acetaminophen (NORCO) 5-325 mg per tablet Take 2 Tabs by mouth every six (6) hours as needed for Pain for up to 30 doses. Max Daily Amount: 8 Tabs. Indications: Pain 8/27/18   MD CHAYA Ellis HFA 90 mcg/actuation inhaler Take 2 Puffs by inhalation two (2) times daily as needed.  5/29/15   Provider, Historical     Allergies   Allergen Reactions    Levaquin [Levofloxacin] Anaphylaxis    Bactrim [Sulfamethoprim] Itching    Morphine Other (comments)     PT STATED DOES NOT WORK ON HER        Objective:     Visit Vitals  /80   Pulse (!) 113   Temp 98 °F (36.7 °C)   Resp 20   Ht 5' 5\" (1.651 m)   Wt 263 lb (119.3 kg)   SpO2 98%   BMI 43.77 kg/m²       Assessment:     Recurrent morbid obesity with GERD, YUE, DJD; no success with medical management. Plan:     Laparoscopic partial gastrectomy with resection of afferent Amrik limb and intra-operative endoscopy. All of her questions have been answered and we reviewed technical aspects of the procedure along with risks to include but not limited to bleeding, infection, VTE, leak, open procedure, visceral injury, GERD/stricture/dysphagia, and poor weight loss/weight regain; also reviewed activity restrictions, post-op diet, expected results. She will complete the 2 week preoperative liver shrinking diet. 28 minutes spent with patient (greater than 50% of time in face-face consultation reviewing risks, activity restrictions, post-op diet, expected results).     Signed By: Karley Lincoln MD     May 14, 2019

## 2019-05-14 NOTE — PROGRESS NOTES
1. Have you been to the ER, urgent care clinic since your last visit? Hospitalized since your last visit? No    2. Have you seen or consulted any other health care providers outside of the [address] [place] [address] since your last visit? Include any pap smears or colon screening.  No

## 2019-05-14 NOTE — PATIENT INSTRUCTIONS
Learning About How to Prepare for Weight-Loss Surgery  How can you prepare for weight-loss surgery? Having weight-loss surgery (also called bariatric surgery) is a big step. You can prepare for surgery by having a plan. Your plan may include your goals for losing weight and how to makes changes in your diet, activity, and lifestyle to help raise your chances of success. One way to prepare for surgery is to think about your goal or reason why you want to reach a healthy weight. Do you want to lower your blood pressure, cholesterol, or blood sugar? Do you want to be able to sleep better, play with your kids, or walk around the block? Having a reason can help you stay with your plan and meet your goals. Your weight-loss surgery team can help you meet your goals and get ready for surgery. Lita Montgomery work with a team that's trained to help you lose weight and make healthy changes in your life. This team may include:  · A medical doctor or nurse to help manage your care and schedule tests before surgery. · A surgeon who specializes in weight-loss surgery. · A registered dietitian to help you plan meals and make changes in the way you eat. · An exercise specialist to help you be more active and get stronger. · A therapist or counselor to help you learn why you eat and teach you ways to deal with stress and your emotions. Your team will also be there to help you prepare for life after surgery. They will help you adjust to new ways of eating and changes to your body. How will weight-loss surgery affect your life? You have likely thought a lot about how surgery may affect your life--how you will eat, how your body will look, or how you will feel. Some people feel overwhelmed with these changes. But planning can help you prepare for the changes and meet your weight-loss goals. One important step in your plan is to learn about the ways surgery will affect your life. These may include:  · A slimmer you.  You probably will lose weight very quickly in the first few months after surgery. As time goes on, your weight loss will slow down. How much weight you lose depends on what type of surgery you had and how well your new eating and activity plans are working for you. · A new way of eating. Success in reaching and keeping a healthy weight depends on making lifelong changes in how you eat. After surgery, you raise your chances of success if you:  ? Eat just a few ounces of food at a time. ? Eat very slowly and chew your food to mush. ? Don't drink for 30 minutes before you eat, during your meal, and for 30 minutes after you eat. ? Are careful about drinking alcohol. ? Avoid foods that are high in fat or sugar. ? Take vitamin and mineral supplements. · A healthier you. Weight-loss surgery can have some real health benefits. Problems like diabetes, high blood pressure, and sleep apnea may go away--or at least become easier to manage. · A more active you. After surgery, being active on most days of the week will help you reach your weight goal and avoid gaining back the weight you lose. · A lot of extra skin. When you lose weight quickly, you may have a lot of extra skin. That's normal. You can have surgery to remove the extra skin if it bothers you. There are going to be some ups and downs while you get used to these changes. So another way to adjust is to identify who can help support you. Getting support from friends and family can help. And joining a support group for people who have had the surgery can be a big help too, because they know what you're going through. As you know, it's a big decision to have weight-loss surgery. But when you have a plan, you can focus on losing weight and living a healthier life. So what steps can you take to prepare for weight-loss surgery? Will you set some goals? Will you learn about how surgery can affect your life? How about asking family or friends for help?  Write out your plan. Then get ready. Where can you learn more? Go to http://xavi-parth.info/. Enter F171 in the search box to learn more about \"Learning About How to Prepare for Weight-Loss Surgery. \"  Current as of: June 25, 2018  Content Version: 11.9  © 1938-9277 [employer], [employer]. Care instructions adapted under license by [employer] (which disclaims liability or warranty for this information). If you have questions about a medical condition or this instruction, always ask your healthcare professional. [address] any warranty or liability for your use of this information.

## 2019-05-20 ENCOUNTER — HOSPITAL ENCOUNTER (OUTPATIENT)
Dept: GENERAL RADIOLOGY | Age: 54
Discharge: HOME | End: 2019-05-20
Attending: SURGERY
Payer: COMMERCIAL

## 2019-05-20 DIAGNOSIS — K21.9 GASTROESOPHAGEAL REFLUX DISEASE WITHOUT ESOPHAGITIS: ICD-10-CM

## 2019-05-20 PROCEDURE — 74241 XR UPPER GI SERIES W KUB: CPT

## 2019-05-23 ENCOUNTER — ANESTHESIA EVENT (OUTPATIENT)
Dept: SURGERY | Age: 54
DRG: 327 | End: 2019-05-23
Payer: COMMERCIAL

## 2019-05-23 ENCOUNTER — ANESTHESIA (OUTPATIENT)
Dept: SURGERY | Age: 54
DRG: 327 | End: 2019-05-23
Payer: COMMERCIAL

## 2019-05-23 ENCOUNTER — HOSPITAL ENCOUNTER (INPATIENT)
Age: 54
LOS: 2 days | Discharge: HOME | DRG: 327 | End: 2019-05-25
Attending: SURGERY | Admitting: SURGERY
Payer: COMMERCIAL

## 2019-05-23 DIAGNOSIS — Z98.84 S/P GASTRIC BYPASS: Primary | ICD-10-CM

## 2019-05-23 PROBLEM — E66.01 MORBID OBESITY (HCC): Status: ACTIVE | Noted: 2019-05-23

## 2019-05-23 PROCEDURE — 77030026438 HC STYL ET INTUB CARD -A: Performed by: NURSE ANESTHETIST, CERTIFIED REGISTERED

## 2019-05-23 PROCEDURE — 77030013567 HC DRN WND RESERV BARD -A: Performed by: SURGERY

## 2019-05-23 PROCEDURE — 77030031139 HC SUT VCRL2 J&J -A: Performed by: SURGERY

## 2019-05-23 PROCEDURE — 76210000016 HC OR PH I REC 1 TO 1.5 HR: Performed by: SURGERY

## 2019-05-23 PROCEDURE — 0BQT4ZZ REPAIR DIAPHRAGM, PERCUTANEOUS ENDOSCOPIC APPROACH: ICD-10-PCS | Performed by: SURGERY

## 2019-05-23 PROCEDURE — 65660000000 HC RM CCU STEPDOWN

## 2019-05-23 PROCEDURE — 77030009956 HC RELD ENDOSTCH COVD -B: Performed by: SURGERY

## 2019-05-23 PROCEDURE — 77030012407 HC DRN WND BARD -B: Performed by: SURGERY

## 2019-05-23 PROCEDURE — 74011250636 HC RX REV CODE- 250/636

## 2019-05-23 PROCEDURE — 77030011640 HC PAD GRND REM COVD -A: Performed by: SURGERY

## 2019-05-23 PROCEDURE — 77030035045 HC TRCR ENDOSC VRSPRT BLDLSS COVD -B: Performed by: SURGERY

## 2019-05-23 PROCEDURE — 88307 TISSUE EXAM BY PATHOLOGIST: CPT

## 2019-05-23 PROCEDURE — 74011250636 HC RX REV CODE- 250/636: Performed by: ANESTHESIOLOGY

## 2019-05-23 PROCEDURE — 76010000133 HC OR TIME 3 TO 3.5 HR: Performed by: SURGERY

## 2019-05-23 PROCEDURE — 74011000250 HC RX REV CODE- 250

## 2019-05-23 PROCEDURE — 77030016151 HC PROTCTR LNS DFOG COVD -B: Performed by: SURGERY

## 2019-05-23 PROCEDURE — 74011250636 HC RX REV CODE- 250/636: Performed by: SURGERY

## 2019-05-23 PROCEDURE — 77030020053 HC ELECTRD LAPSCP COVD -B: Performed by: SURGERY

## 2019-05-23 PROCEDURE — 77030008756 HC TU IRR SUC STRY -B: Performed by: SURGERY

## 2019-05-23 PROCEDURE — 77030002916 HC SUT ETHLN J&J -A: Performed by: SURGERY

## 2019-05-23 PROCEDURE — 77030018846 HC SOL IRR STRL H20 ICUM -A: Performed by: SURGERY

## 2019-05-23 PROCEDURE — 77030034154 HC SHR COAG HARM ACE J&J -F: Performed by: SURGERY

## 2019-05-23 PROCEDURE — 77030009957 HC RELD ENDOSTCH COVD -C: Performed by: SURGERY

## 2019-05-23 PROCEDURE — P9045 ALBUMIN (HUMAN), 5%, 250 ML: HCPCS

## 2019-05-23 PROCEDURE — 77030035051: Performed by: SURGERY

## 2019-05-23 PROCEDURE — 76060000037 HC ANESTHESIA 3 TO 3.5 HR: Performed by: SURGERY

## 2019-05-23 PROCEDURE — 77030002933 HC SUT MCRYL J&J -A: Performed by: SURGERY

## 2019-05-23 PROCEDURE — 77030030826 HC RETRCTR WND ALEXS AMR -B: Performed by: SURGERY

## 2019-05-23 PROCEDURE — 76010000173 HC OR TIME 3 TO 3.5 HR INTENSV-TIER 1: Performed by: SURGERY

## 2019-05-23 PROCEDURE — 77030008437 HC REINF STRP REINF SEMGD WLGO -C: Performed by: SURGERY

## 2019-05-23 PROCEDURE — 77030027138 HC INCENT SPIROMETER -A

## 2019-05-23 PROCEDURE — 0DNA4ZZ RELEASE JEJUNUM, PERCUTANEOUS ENDOSCOPIC APPROACH: ICD-10-PCS | Performed by: SURGERY

## 2019-05-23 PROCEDURE — 94640 AIRWAY INHALATION TREATMENT: CPT

## 2019-05-23 PROCEDURE — 77030020782 HC GWN BAIR PAWS FLX 3M -B

## 2019-05-23 PROCEDURE — 77030038157 HC DEV PWR CNTR DISP SIGNIA COVD -C: Performed by: SURGERY

## 2019-05-23 PROCEDURE — 77030032490 HC SLV COMPR SCD KNE COVD -B: Performed by: SURGERY

## 2019-05-23 PROCEDURE — 77030032435: Performed by: SURGERY

## 2019-05-23 PROCEDURE — 74011250637 HC RX REV CODE- 250/637: Performed by: SURGERY

## 2019-05-23 PROCEDURE — 74011000250 HC RX REV CODE- 250: Performed by: SURGERY

## 2019-05-23 PROCEDURE — 77030002895 HC DEV VASC CLOSR COVD -B: Performed by: SURGERY

## 2019-05-23 PROCEDURE — 77030020263 HC SOL INJ SOD CL0.9% LFCR 1000ML: Performed by: SURGERY

## 2019-05-23 PROCEDURE — 77030008023 HC RELD SUT ENDOSC COVD -B: Performed by: SURGERY

## 2019-05-23 PROCEDURE — 77030009965 HC RELD STPLR ENDOS COVD -D: Performed by: SURGERY

## 2019-05-23 PROCEDURE — 77030020747 HC TU INSUF ENDOSC TELE -A: Performed by: SURGERY

## 2019-05-23 PROCEDURE — 77030009852 HC PCH RTVR ENDOSC COVD -B: Performed by: SURGERY

## 2019-05-23 PROCEDURE — 0DCA4ZZ EXTIRPATION OF MATTER FROM JEJUNUM, PERCUTANEOUS ENDOSCOPIC APPROACH: ICD-10-PCS | Performed by: SURGERY

## 2019-05-23 PROCEDURE — 77030039266 HC ADH SKN EXOFIN S2SG -A: Performed by: SURGERY

## 2019-05-23 PROCEDURE — 0DJ08ZZ INSPECTION OF UPPER INTESTINAL TRACT, VIA NATURAL OR ARTIFICIAL OPENING ENDOSCOPIC: ICD-10-PCS | Performed by: SURGERY

## 2019-05-23 PROCEDURE — 0D164ZA BYPASS STOMACH TO JEJUNUM, PERCUTANEOUS ENDOSCOPIC APPROACH: ICD-10-PCS | Performed by: SURGERY

## 2019-05-23 PROCEDURE — 77030037032 HC INSRT SCIS CLICKLLINE DISP STOR -B: Performed by: SURGERY

## 2019-05-23 PROCEDURE — 77030018836 HC SOL IRR NACL ICUM -A: Performed by: SURGERY

## 2019-05-23 PROCEDURE — 77030008728 HC TU GAST LAPSCP APOL -C: Performed by: SURGERY

## 2019-05-23 PROCEDURE — 77030035048 HC TRCR ENDOSC OPTCL COVD -B: Performed by: SURGERY

## 2019-05-23 PROCEDURE — 77030008684 HC TU ET CUF COVD -B: Performed by: NURSE ANESTHETIST, CERTIFIED REGISTERED

## 2019-05-23 RX ORDER — SUCCINYLCHOLINE CHLORIDE 20 MG/ML
INJECTION INTRAMUSCULAR; INTRAVENOUS AS NEEDED
Status: DISCONTINUED | OUTPATIENT
Start: 2019-05-23 | End: 2019-05-23 | Stop reason: HOSPADM

## 2019-05-23 RX ORDER — ALBUTEROL SULFATE 0.83 MG/ML
2.5 SOLUTION RESPIRATORY (INHALATION)
Status: DISCONTINUED | OUTPATIENT
Start: 2019-05-23 | End: 2019-05-25 | Stop reason: HOSPADM

## 2019-05-23 RX ORDER — HYDROMORPHONE HYDROCHLORIDE 1 MG/ML
2 INJECTION, SOLUTION INTRAMUSCULAR; INTRAVENOUS; SUBCUTANEOUS
Status: DISCONTINUED | OUTPATIENT
Start: 2019-05-23 | End: 2019-05-25 | Stop reason: HOSPADM

## 2019-05-23 RX ORDER — FENTANYL CITRATE 50 UG/ML
INJECTION, SOLUTION INTRAMUSCULAR; INTRAVENOUS AS NEEDED
Status: DISCONTINUED | OUTPATIENT
Start: 2019-05-23 | End: 2019-05-23 | Stop reason: HOSPADM

## 2019-05-23 RX ORDER — ONDANSETRON 2 MG/ML
4 INJECTION INTRAMUSCULAR; INTRAVENOUS
Status: DISCONTINUED | OUTPATIENT
Start: 2019-05-23 | End: 2019-05-25 | Stop reason: HOSPADM

## 2019-05-23 RX ORDER — DEXAMETHASONE SODIUM PHOSPHATE 4 MG/ML
INJECTION, SOLUTION INTRA-ARTICULAR; INTRALESIONAL; INTRAMUSCULAR; INTRAVENOUS; SOFT TISSUE AS NEEDED
Status: DISCONTINUED | OUTPATIENT
Start: 2019-05-23 | End: 2019-05-23 | Stop reason: HOSPADM

## 2019-05-23 RX ORDER — LIDOCAINE HYDROCHLORIDE 20 MG/ML
INJECTION, SOLUTION EPIDURAL; INFILTRATION; INTRACAUDAL; PERINEURAL AS NEEDED
Status: DISCONTINUED | OUTPATIENT
Start: 2019-05-23 | End: 2019-05-23 | Stop reason: HOSPADM

## 2019-05-23 RX ORDER — FENTANYL 25 UG/1
1 PATCH TRANSDERMAL
Status: DISCONTINUED | OUTPATIENT
Start: 2019-05-23 | End: 2019-05-25 | Stop reason: HOSPADM

## 2019-05-23 RX ORDER — GABAPENTIN 250 MG/5ML
600 SOLUTION ORAL ONCE
Status: DISCONTINUED | OUTPATIENT
Start: 2019-05-23 | End: 2019-05-23 | Stop reason: HOSPADM

## 2019-05-23 RX ORDER — HYOSCYAMINE SULFATE 0.12 MG/1
0.12 TABLET SUBLINGUAL
Status: DISCONTINUED | OUTPATIENT
Start: 2019-05-23 | End: 2019-05-25 | Stop reason: HOSPADM

## 2019-05-23 RX ORDER — LIDOCAINE HYDROCHLORIDE ANHYDROUS AND DEXTROSE MONOHYDRATE .8; 5 G/100ML; G/100ML
1 INJECTION, SOLUTION INTRAVENOUS CONTINUOUS
Status: ACTIVE | OUTPATIENT
Start: 2019-05-23 | End: 2019-05-24

## 2019-05-23 RX ORDER — SCOLOPAMINE TRANSDERMAL SYSTEM 1 MG/1
1 PATCH, EXTENDED RELEASE TRANSDERMAL ONCE
Status: DISCONTINUED | OUTPATIENT
Start: 2019-05-23 | End: 2019-05-25 | Stop reason: HOSPADM

## 2019-05-23 RX ORDER — SODIUM CHLORIDE, SODIUM LACTATE, POTASSIUM CHLORIDE, CALCIUM CHLORIDE 600; 310; 30; 20 MG/100ML; MG/100ML; MG/100ML; MG/100ML
125 INJECTION, SOLUTION INTRAVENOUS CONTINUOUS
Status: DISCONTINUED | OUTPATIENT
Start: 2019-05-23 | End: 2019-05-25 | Stop reason: HOSPADM

## 2019-05-23 RX ORDER — PHENYLEPHRINE HCL IN 0.9% NACL 0.4MG/10ML
SYRINGE (ML) INTRAVENOUS AS NEEDED
Status: DISCONTINUED | OUTPATIENT
Start: 2019-05-23 | End: 2019-05-23 | Stop reason: HOSPADM

## 2019-05-23 RX ORDER — ALBUMIN HUMAN 50 G/1000ML
SOLUTION INTRAVENOUS AS NEEDED
Status: DISCONTINUED | OUTPATIENT
Start: 2019-05-23 | End: 2019-05-23 | Stop reason: HOSPADM

## 2019-05-23 RX ORDER — SODIUM CHLORIDE 0.9 % (FLUSH) 0.9 %
5-40 SYRINGE (ML) INJECTION AS NEEDED
Status: DISCONTINUED | OUTPATIENT
Start: 2019-05-23 | End: 2019-05-23 | Stop reason: HOSPADM

## 2019-05-23 RX ORDER — ACETAMINOPHEN 500 MG
1000 TABLET ORAL EVERY 6 HOURS
Status: DISCONTINUED | OUTPATIENT
Start: 2019-05-23 | End: 2019-05-25 | Stop reason: HOSPADM

## 2019-05-23 RX ORDER — MAGNESIUM SULFATE HEPTAHYDRATE 40 MG/ML
INJECTION, SOLUTION INTRAVENOUS AS NEEDED
Status: DISCONTINUED | OUTPATIENT
Start: 2019-05-23 | End: 2019-05-23 | Stop reason: HOSPADM

## 2019-05-23 RX ORDER — ONDANSETRON 2 MG/ML
4 INJECTION INTRAMUSCULAR; INTRAVENOUS AS NEEDED
Status: DISCONTINUED | OUTPATIENT
Start: 2019-05-23 | End: 2019-05-23 | Stop reason: HOSPADM

## 2019-05-23 RX ORDER — FENTANYL CITRATE 50 UG/ML
25 INJECTION, SOLUTION INTRAMUSCULAR; INTRAVENOUS
Status: DISCONTINUED | OUTPATIENT
Start: 2019-05-23 | End: 2019-05-23 | Stop reason: HOSPADM

## 2019-05-23 RX ORDER — MIDAZOLAM HYDROCHLORIDE 1 MG/ML
1 INJECTION, SOLUTION INTRAMUSCULAR; INTRAVENOUS AS NEEDED
Status: DISCONTINUED | OUTPATIENT
Start: 2019-05-23 | End: 2019-05-23 | Stop reason: HOSPADM

## 2019-05-23 RX ORDER — SODIUM CHLORIDE 9 MG/ML
25 INJECTION, SOLUTION INTRAVENOUS CONTINUOUS
Status: DISCONTINUED | OUTPATIENT
Start: 2019-05-23 | End: 2019-05-23 | Stop reason: HOSPADM

## 2019-05-23 RX ORDER — LIDOCAINE HYDROCHLORIDE 10 MG/ML
0.1 INJECTION, SOLUTION EPIDURAL; INFILTRATION; INTRACAUDAL; PERINEURAL AS NEEDED
Status: DISCONTINUED | OUTPATIENT
Start: 2019-05-23 | End: 2019-05-23 | Stop reason: HOSPADM

## 2019-05-23 RX ORDER — SODIUM CHLORIDE 0.9 % (FLUSH) 0.9 %
5-40 SYRINGE (ML) INJECTION EVERY 8 HOURS
Status: DISCONTINUED | OUTPATIENT
Start: 2019-05-23 | End: 2019-05-23 | Stop reason: HOSPADM

## 2019-05-23 RX ORDER — CEFAZOLIN SODIUM/WATER 2 G/20 ML
2 SYRINGE (ML) INTRAVENOUS
Status: COMPLETED | OUTPATIENT
Start: 2019-05-23 | End: 2019-05-23

## 2019-05-23 RX ORDER — GABAPENTIN 100 MG/1
200 CAPSULE ORAL 2 TIMES DAILY
Status: DISCONTINUED | OUTPATIENT
Start: 2019-05-23 | End: 2019-05-25 | Stop reason: HOSPADM

## 2019-05-23 RX ORDER — DIPHENHYDRAMINE HYDROCHLORIDE 50 MG/ML
12.5 INJECTION, SOLUTION INTRAMUSCULAR; INTRAVENOUS AS NEEDED
Status: DISCONTINUED | OUTPATIENT
Start: 2019-05-23 | End: 2019-05-23 | Stop reason: HOSPADM

## 2019-05-23 RX ORDER — SODIUM CHLORIDE, SODIUM LACTATE, POTASSIUM CHLORIDE, CALCIUM CHLORIDE 600; 310; 30; 20 MG/100ML; MG/100ML; MG/100ML; MG/100ML
75 INJECTION, SOLUTION INTRAVENOUS CONTINUOUS
Status: DISCONTINUED | OUTPATIENT
Start: 2019-05-23 | End: 2019-05-23 | Stop reason: HOSPADM

## 2019-05-23 RX ORDER — NALOXONE HYDROCHLORIDE 0.4 MG/ML
0.4 INJECTION, SOLUTION INTRAMUSCULAR; INTRAVENOUS; SUBCUTANEOUS AS NEEDED
Status: DISCONTINUED | OUTPATIENT
Start: 2019-05-23 | End: 2019-05-25 | Stop reason: HOSPADM

## 2019-05-23 RX ORDER — HYDROMORPHONE HYDROCHLORIDE 2 MG/1
2 TABLET ORAL
Status: DISCONTINUED | OUTPATIENT
Start: 2019-05-23 | End: 2019-05-25 | Stop reason: HOSPADM

## 2019-05-23 RX ORDER — PROPOFOL 10 MG/ML
INJECTION, EMULSION INTRAVENOUS AS NEEDED
Status: DISCONTINUED | OUTPATIENT
Start: 2019-05-23 | End: 2019-05-23 | Stop reason: HOSPADM

## 2019-05-23 RX ORDER — ROCURONIUM BROMIDE 10 MG/ML
INJECTION, SOLUTION INTRAVENOUS AS NEEDED
Status: DISCONTINUED | OUTPATIENT
Start: 2019-05-23 | End: 2019-05-23 | Stop reason: HOSPADM

## 2019-05-23 RX ORDER — ALBUTEROL SULFATE 90 UG/1
2 AEROSOL, METERED RESPIRATORY (INHALATION)
Status: DISCONTINUED | OUTPATIENT
Start: 2019-05-23 | End: 2019-05-23 | Stop reason: CLARIF

## 2019-05-23 RX ORDER — NALOXONE HYDROCHLORIDE 0.4 MG/ML
INJECTION, SOLUTION INTRAMUSCULAR; INTRAVENOUS; SUBCUTANEOUS AS NEEDED
Status: DISCONTINUED | OUTPATIENT
Start: 2019-05-23 | End: 2019-05-23 | Stop reason: HOSPADM

## 2019-05-23 RX ORDER — FENTANYL CITRATE 50 UG/ML
50 INJECTION, SOLUTION INTRAMUSCULAR; INTRAVENOUS AS NEEDED
Status: DISCONTINUED | OUTPATIENT
Start: 2019-05-23 | End: 2019-05-23 | Stop reason: HOSPADM

## 2019-05-23 RX ORDER — SODIUM CHLORIDE, SODIUM LACTATE, POTASSIUM CHLORIDE, CALCIUM CHLORIDE 600; 310; 30; 20 MG/100ML; MG/100ML; MG/100ML; MG/100ML
125 INJECTION, SOLUTION INTRAVENOUS CONTINUOUS
Status: DISCONTINUED | OUTPATIENT
Start: 2019-05-23 | End: 2019-05-23 | Stop reason: HOSPADM

## 2019-05-23 RX ORDER — SODIUM CHLORIDE 0.9 % (FLUSH) 0.9 %
5-40 SYRINGE (ML) INJECTION EVERY 8 HOURS
Status: DISCONTINUED | OUTPATIENT
Start: 2019-05-23 | End: 2019-05-25 | Stop reason: HOSPADM

## 2019-05-23 RX ORDER — BUPIVACAINE HYDROCHLORIDE 5 MG/ML
50 INJECTION, SOLUTION EPIDURAL; INTRACAUDAL ONCE
Status: COMPLETED | OUTPATIENT
Start: 2019-05-23 | End: 2019-05-23

## 2019-05-23 RX ORDER — FLUTICASONE PROPIONATE 50 MCG
2 SPRAY, SUSPENSION (ML) NASAL DAILY
Status: DISCONTINUED | OUTPATIENT
Start: 2019-05-23 | End: 2019-05-25 | Stop reason: HOSPADM

## 2019-05-23 RX ORDER — SODIUM CHLORIDE 0.9 % (FLUSH) 0.9 %
5-40 SYRINGE (ML) INJECTION AS NEEDED
Status: DISCONTINUED | OUTPATIENT
Start: 2019-05-23 | End: 2019-05-25 | Stop reason: HOSPADM

## 2019-05-23 RX ORDER — DEXMEDETOMIDINE HYDROCHLORIDE 4 UG/ML
INJECTION, SOLUTION INTRAVENOUS AS NEEDED
Status: DISCONTINUED | OUTPATIENT
Start: 2019-05-23 | End: 2019-05-23 | Stop reason: HOSPADM

## 2019-05-23 RX ORDER — LORAZEPAM 2 MG/ML
1 INJECTION INTRAMUSCULAR
Status: DISCONTINUED | OUTPATIENT
Start: 2019-05-23 | End: 2019-05-25 | Stop reason: HOSPADM

## 2019-05-23 RX ORDER — MIDAZOLAM HYDROCHLORIDE 1 MG/ML
INJECTION, SOLUTION INTRAMUSCULAR; INTRAVENOUS AS NEEDED
Status: DISCONTINUED | OUTPATIENT
Start: 2019-05-23 | End: 2019-05-23 | Stop reason: HOSPADM

## 2019-05-23 RX ORDER — MORPHINE SULFATE 10 MG/ML
2 INJECTION, SOLUTION INTRAMUSCULAR; INTRAVENOUS
Status: DISCONTINUED | OUTPATIENT
Start: 2019-05-23 | End: 2019-05-23 | Stop reason: HOSPADM

## 2019-05-23 RX ORDER — KETAMINE HYDROCHLORIDE 10 MG/ML
INJECTION, SOLUTION INTRAMUSCULAR; INTRAVENOUS AS NEEDED
Status: DISCONTINUED | OUTPATIENT
Start: 2019-05-23 | End: 2019-05-23 | Stop reason: HOSPADM

## 2019-05-23 RX ORDER — LIDOCAINE HYDROCHLORIDE 20 MG/ML
INJECTION, SOLUTION EPIDURAL; INFILTRATION; INTRACAUDAL; PERINEURAL
Status: DISCONTINUED | OUTPATIENT
Start: 2019-05-23 | End: 2019-05-23 | Stop reason: HOSPADM

## 2019-05-23 RX ORDER — MIDAZOLAM HYDROCHLORIDE 1 MG/ML
0.5 INJECTION, SOLUTION INTRAMUSCULAR; INTRAVENOUS
Status: DISCONTINUED | OUTPATIENT
Start: 2019-05-23 | End: 2019-05-23 | Stop reason: HOSPADM

## 2019-05-23 RX ORDER — ACETAMINOPHEN 325 MG/1
650 TABLET ORAL ONCE
Status: DISCONTINUED | OUTPATIENT
Start: 2019-05-23 | End: 2019-05-23 | Stop reason: HOSPADM

## 2019-05-23 RX ORDER — HYDROMORPHONE HYDROCHLORIDE 1 MG/ML
0.2 INJECTION, SOLUTION INTRAMUSCULAR; INTRAVENOUS; SUBCUTANEOUS
Status: DISCONTINUED | OUTPATIENT
Start: 2019-05-23 | End: 2019-05-23 | Stop reason: HOSPADM

## 2019-05-23 RX ORDER — ROPIVACAINE HYDROCHLORIDE 5 MG/ML
30 INJECTION, SOLUTION EPIDURAL; INFILTRATION; PERINEURAL ONCE
Status: DISCONTINUED | OUTPATIENT
Start: 2019-05-23 | End: 2019-05-23 | Stop reason: HOSPADM

## 2019-05-23 RX ADMIN — LIDOCAINE HYDROCHLORIDE 10.7 ML/HR: 20 INJECTION, SOLUTION EPIDURAL; INFILTRATION; INTRACAUDAL; PERINEURAL at 07:32

## 2019-05-23 RX ADMIN — Medication 120 MCG: at 07:51

## 2019-05-23 RX ADMIN — NALOXONE HYDROCHLORIDE 0.04 MG: 0.4 INJECTION, SOLUTION INTRAMUSCULAR; INTRAVENOUS; SUBCUTANEOUS at 10:25

## 2019-05-23 RX ADMIN — HYOSCYAMINE SULFATE 0.12 MG: 0.12 TABLET ORAL at 14:43

## 2019-05-23 RX ADMIN — KETAMINE HYDROCHLORIDE 50 MG: 10 INJECTION, SOLUTION INTRAMUSCULAR; INTRAVENOUS at 07:59

## 2019-05-23 RX ADMIN — ACETAMINOPHEN 1000 MG: 650 SOLUTION ORAL at 06:49

## 2019-05-23 RX ADMIN — HYDROMORPHONE HYDROCHLORIDE 2 MG: 1 INJECTION, SOLUTION INTRAMUSCULAR; INTRAVENOUS; SUBCUTANEOUS at 17:18

## 2019-05-23 RX ADMIN — SUCCINYLCHOLINE CHLORIDE 200 MG: 20 INJECTION INTRAMUSCULAR; INTRAVENOUS at 07:32

## 2019-05-23 RX ADMIN — HYDROMORPHONE HYDROCHLORIDE 2 MG: 1 INJECTION, SOLUTION INTRAMUSCULAR; INTRAVENOUS; SUBCUTANEOUS at 12:34

## 2019-05-23 RX ADMIN — NALOXONE HYDROCHLORIDE 0.04 MG: 0.4 INJECTION, SOLUTION INTRAMUSCULAR; INTRAVENOUS; SUBCUTANEOUS at 10:27

## 2019-05-23 RX ADMIN — PROPOFOL 200 MG: 10 INJECTION, EMULSION INTRAVENOUS at 07:32

## 2019-05-23 RX ADMIN — ACETAMINOPHEN 1000 MG: 500 TABLET ORAL at 17:17

## 2019-05-23 RX ADMIN — Medication 120 MCG: at 07:59

## 2019-05-23 RX ADMIN — DEXMEDETOMIDINE HYDROCHLORIDE 9 MCG: 4 INJECTION, SOLUTION INTRAVENOUS at 09:43

## 2019-05-23 RX ADMIN — SODIUM CHLORIDE, SODIUM LACTATE, POTASSIUM CHLORIDE, AND CALCIUM CHLORIDE: 600; 310; 30; 20 INJECTION, SOLUTION INTRAVENOUS at 09:57

## 2019-05-23 RX ADMIN — LIDOCAINE HYDROCHLORIDE 100 MG: 20 INJECTION, SOLUTION EPIDURAL; INFILTRATION; INTRACAUDAL; PERINEURAL at 07:32

## 2019-05-23 RX ADMIN — FENTANYL CITRATE 25 MCG: 50 INJECTION INTRAMUSCULAR; INTRAVENOUS at 10:53

## 2019-05-23 RX ADMIN — Medication 2 G: at 07:49

## 2019-05-23 RX ADMIN — SODIUM CHLORIDE, SODIUM LACTATE, POTASSIUM CHLORIDE, AND CALCIUM CHLORIDE: 600; 310; 30; 20 INJECTION, SOLUTION INTRAVENOUS at 10:24

## 2019-05-23 RX ADMIN — FENTANYL CITRATE 100 MCG: 50 INJECTION, SOLUTION INTRAMUSCULAR; INTRAVENOUS at 07:59

## 2019-05-23 RX ADMIN — HYDROMORPHONE HYDROCHLORIDE 2 MG: 2 TABLET ORAL at 23:21

## 2019-05-23 RX ADMIN — MAGNESIUM SULFATE HEPTAHYDRATE 2 G: 40 INJECTION, SOLUTION INTRAVENOUS at 08:41

## 2019-05-23 RX ADMIN — GABAPENTIN 200 MG: 100 CAPSULE ORAL at 17:18

## 2019-05-23 RX ADMIN — Medication 10 ML: at 21:01

## 2019-05-23 RX ADMIN — SODIUM CHLORIDE, SODIUM LACTATE, POTASSIUM CHLORIDE, AND CALCIUM CHLORIDE 125 ML/HR: 600; 310; 30; 20 INJECTION, SOLUTION INTRAVENOUS at 07:02

## 2019-05-23 RX ADMIN — MIDAZOLAM HYDROCHLORIDE 2 MG: 1 INJECTION, SOLUTION INTRAMUSCULAR; INTRAVENOUS at 07:22

## 2019-05-23 RX ADMIN — HYOSCYAMINE SULFATE 0.12 MG: 0.12 TABLET ORAL at 21:01

## 2019-05-23 RX ADMIN — HYDROMORPHONE HYDROCHLORIDE 2 MG: 2 TABLET ORAL at 19:19

## 2019-05-23 RX ADMIN — ONDANSETRON HYDROCHLORIDE 4 MG: 2 INJECTION, SOLUTION INTRAMUSCULAR; INTRAVENOUS at 14:43

## 2019-05-23 RX ADMIN — HYDROMORPHONE HYDROCHLORIDE 2 MG: 2 TABLET ORAL at 14:41

## 2019-05-23 RX ADMIN — FLUTICASONE PROPIONATE 2 SPRAY: 50 SPRAY, METERED NASAL at 13:00

## 2019-05-23 RX ADMIN — DEXMEDETOMIDINE HYDROCHLORIDE 9 MCG: 4 INJECTION, SOLUTION INTRAVENOUS at 09:45

## 2019-05-23 RX ADMIN — ROCURONIUM BROMIDE 40 MG: 10 INJECTION, SOLUTION INTRAVENOUS at 07:49

## 2019-05-23 RX ADMIN — ACETAMINOPHEN 1000 MG: 500 TABLET ORAL at 23:12

## 2019-05-23 RX ADMIN — Medication 10 ML: at 15:15

## 2019-05-23 RX ADMIN — DEXAMETHASONE SODIUM PHOSPHATE 8 MG: 4 INJECTION, SOLUTION INTRA-ARTICULAR; INTRALESIONAL; INTRAMUSCULAR; INTRAVENOUS; SOFT TISSUE at 07:32

## 2019-05-23 RX ADMIN — ALBUMIN HUMAN 250 ML: 50 SOLUTION INTRAVENOUS at 07:54

## 2019-05-23 RX ADMIN — DEXMEDETOMIDINE HYDROCHLORIDE 9 MCG: 4 INJECTION, SOLUTION INTRAVENOUS at 09:39

## 2019-05-23 RX ADMIN — ROCURONIUM BROMIDE 5 MG: 10 INJECTION, SOLUTION INTRAVENOUS at 07:32

## 2019-05-23 RX ADMIN — ALBUTEROL SULFATE 2.5 MG: 2.5 SOLUTION RESPIRATORY (INHALATION) at 17:37

## 2019-05-23 RX ADMIN — DEXMEDETOMIDINE HYDROCHLORIDE 3 MCG: 4 INJECTION, SOLUTION INTRAVENOUS at 09:41

## 2019-05-23 NOTE — ANESTHESIA PREPROCEDURE EVALUATION
Relevant Problems   No relevant active problems       Anesthetic History   No history of anesthetic complications            Review of Systems / Medical History  Patient summary reviewed, nursing notes reviewed and pertinent labs reviewed    Pulmonary            Asthma : well controlled       Neuro/Psych   Within defined limits           Cardiovascular    Hypertension: well controlled                   GI/Hepatic/Renal  Within defined limits              Endo/Other        Morbid obesity     Other Findings              Physical Exam    Airway  Mallampati: II  TM Distance: > 6 cm  Neck ROM: normal range of motion   Mouth opening: Normal     Cardiovascular  Regular rate and rhythm,  S1 and S2 normal,  no murmur, click, rub, or gallop             Dental  No notable dental hx       Pulmonary  Breath sounds clear to auscultation               Abdominal  GI exam deferred       Other Findings            Anesthetic Plan    ASA: 3  Anesthesia type: general          Induction: Intravenous  Anesthetic plan and risks discussed with: Patient

## 2019-05-23 NOTE — ROUTINE PROCESS
Patient: Rah Roman MRN: [id number]  SSN: [id number] YOB: 1965  Age: 49 y.o. Sex: female Patient is status post Procedure(s): LAPAROSCOPIC PARTIAL GASTRECTOMY  WITH RESECTION OF SMALL BOWEL , LYSIS OF ADHSION , HIATAL HEANIA REPAIR , ENDOSCOPY (E R A S), HIATAL HERNIA REPAIR 
ESOPHAGOGASTRODUODENOSCOPY (EGD). Surgeon(s) and Role: Daly Rawls MD - Primary Peripheral IV 05/23/19 Right Hand (Active) Site Assessment Clean, dry, & intact 5/23/2019  7:02 AM  
Phlebitis Assessment 0 5/23/2019  7:02 AM  
Infiltration Assessment 0 5/23/2019  7:02 AM  
Dressing Status New 5/23/2019  7:02 AM  
Dressing Type Tape;Transparent 5/23/2019  7:02 AM  
Hub Color/Line Status Blue 5/23/2019  7:02 AM  
      
Alfa-Bradley Drain 05/23/19 Left Abdomen (Active) Airway - Endotracheal Tube 05/23/19 Oral (Active) Dressing/Packing:  Wound Abdomen-Dressing Type: Topical skin adhesive/glue(4 TROCARS SITES) (05/23/19 0956) Splint/Cast:  ]

## 2019-05-23 NOTE — PROGRESS NOTES
Patient walked in hallway and sat in chair while getting neb. Treatment. Patient tolerating oral medications without nausea. Patient still in a lot of pain but continuing to monitor.

## 2019-05-23 NOTE — ANESTHESIA POSTPROCEDURE EVALUATION
Post-Anesthesia Evaluation and Assessment    Patient: Lubna Palacios MRN: [id number]  SSN: [id number]    YOB: 1965  Age: 49 y.o. Sex: female      I have evaluated the patient and they are stable and ready for discharge from the PACU. Cardiovascular Function/Vital Signs  Visit Vitals  /72   Pulse 81   Temp 36.3 °C (97.4 °F)   Resp 14   Ht 5' 5\" (1.651 m)   Wt 116.7 kg (257 lb 4.8 oz)   SpO2 98%   BMI 42.82 kg/m²       Patient is status post General anesthesia for Procedure(s):  LAPAROSCOPIC PARTIAL GASTRECTOMY  WITH RESECTION OF SMALL BOWEL , LYSIS OF ADHSION , HIATAL HEANIA REPAIR , ENDOSCOPY (E R A S), HIATAL HERNIA REPAIR  ESOPHAGOGASTRODUODENOSCOPY (EGD). Nausea/Vomiting: None    Postoperative hydration reviewed and adequate. Pain:  Pain Scale 1: Numeric (0 - 10) (05/23/19 7491)  Pain Intensity 1: 6 (05/23/19 8689)   Managed    Neurological Status:   Neuro (WDL): Within Defined Limits (05/23/19 0643)   At baseline    Mental Status, Level of Consciousness: Alert and  oriented to person, place, and time    Pulmonary Status:   O2 Device: Nasal cannula (05/23/19 1042)   Adequate oxygenation and airway patent    Complications related to anesthesia: None    Post-anesthesia assessment completed. No concerns    Signed By: Aletha Brian MD     May 23, 2019              Procedure(s):  LAPAROSCOPIC PARTIAL GASTRECTOMY  WITH RESECTION OF SMALL BOWEL , LYSIS OF ADHSION , HIATAL HEANIA REPAIR , ENDOSCOPY (E R A S), HIATAL HERNIA REPAIR  ESOPHAGOGASTRODUODENOSCOPY (EGD). general    <BSHSIANPOST>    Vitals Value Taken Time   /72 5/23/2019 11:00 AM   Temp 36.3 °C (97.4 °F) 5/23/2019 10:42 AM   Pulse 81 5/23/2019 11:03 AM   Resp 13 5/23/2019 11:03 AM   SpO2 99 % 5/23/2019 11:03 AM   Vitals shown include unvalidated device data.

## 2019-05-23 NOTE — H&P
HISTORY OF PRESENT ILLNESS  Teto Francois is a 49 y.o. female with a history of laparoscopic GBP (2004, [facility], Dr. Jadiel Pelayo). She has developed weight regain with GERD symptoms and re-emergence of comorbidities.          Patient Active Problem List   Diagnosis Code    Sarcoidosis D86.9    Cramps, muscle, general R25.2    Bilateral low back pain without sciatica M54.5    Primary localized osteoarthritis of left hip M16.12    Obesity, morbid (HCC) E66.01    GERD (gastroesophageal reflux disease) K21.9    YUE treated with BiPAP G47.33    Chronic, continuous use of opioids F11.90           Past Medical History:   Diagnosis Date    Arthritis      Asthma      Cancer ([address] 2012     LEFT BREAST    Chronic pain       LOWER BACK, SCIATICA, RIGHT HIP PAIN    Fall      GERD (gastroesophageal reflux disease)      Headache      Hypertension      Morbid obesity ([address]      Muscular dystrophy ([address]      Nausea & vomiting       USED SCOPALAMINE PATCH IN PAST    Pulmonary hypertension ([address]      Sarcoidosis       IN LUNGS  RESOLVED      Sleep apnea       USES BIPAP  PT DOES NOT USE DUE TO FACIAL INJURY            Past Surgical History:   Procedure Laterality Date    CHEST SURGERY PROCEDURE UNLISTED   2000     BIOPSY OUTER LUNG    HX BREAST RECONSTRUCTION   2012    HX BREAST RECONSTRUCTION   2012      3 SURGERIES TO REMOVE DEAD TISSUE    HX BREAST RECONSTRUCTION   2013 2015     LATISAMUS FLAP, IMPLANTS FAT GRAFTING     HX BREAST RECONSTRUCTION         13 TOTAL SURGERIES    HX CERVICAL FUSION   2012     C5-C6 hardware    HX CHOLECYSTECTOMY   1998    HX COLPOSCOPY   2001    HX GASTRIC BYPASS   2003    HX GYN   2001     CERVIX CONE PROCEDURE    HX GYN   2008     EUTERO ABLATION    HX HEENT        HX HEENT   05/29/2017     DOG BITE TO FACE     HX HEENT   09/15/2017     DOG BITE TO FACE     HX HEENT Bilateral 08/20/2018     EYE /NOSE FROM DOG BITE    HX HYSTERECTOMY   2014    HX HYSTEROSCOPY WITH ENDOMETRIAL ABLATION   2008    HX KNEE ARTHROSCOPY Right 2016    HX LUMBAR FUSION   2011     L5-S1 hardware    HX MASTECTOMY Bilateral 2012    HX ORTHOPAEDIC Right 2010     BONE GRAFT SCAFOID BONE WRIST    HX ORTHOPAEDIC Left 2016     THR    HX ORTHOPAEDIC Right 2015     MUSCLE BIOPSY -MADD MUSCULAR DYSTROPHY     HX OTHER SURGICAL   2013     PAIN STIMULATOR    HX OTHER SURGICAL   2015     MUSCLE BIOPSY     HX RHINOPLASTY   5174,3550     X 2    HX TONSILLECTOMY   1982    HX UROLOGICAL   2018     CYSTOSCOPY WITH BLADDER BIOPSY     IR BRONCHOSCOPY        NEUROLOGICAL PROCEDURE UNLISTED   2017     REPLACE RELOCATE BATTERY FOR STIMULATOR     NEUROLOGICAL PROCEDURE UNLISTED        PAIN STIMULATOR IMPLANTED T8-T9      Social History            Socioeconomic History    Marital status:        Spouse name: Not on file    Number of children: Not on file    Years of education: Not on file    Highest education level: Not on file   Occupational History    Occupation: disabled        Comment:    Social Needs    Financial resource strain: Not on file    Food insecurity:       Worry: Not on file       Inability: Not on file    Transportation needs:       Medical: Not on file       Non-medical: Not on file   Tobacco Use    Smoking status: Former Smoker       Packs/day: 0.50       Years: 3.00       Pack years: 1.50       Types: Cigarettes       Last attempt to quit: 1988       Years since quittin.2    Smokeless tobacco: Never Used   Substance and Sexual Activity    Alcohol use: No    Drug use:  Yes       Types: Prescription    Sexual activity: Yes       Birth control/protection: Surgical   Lifestyle    Physical activity:       Days per week: Not on file       Minutes per session: Not on file    Stress: Not on file   Relationships    Social connections:       Talks on phone: Not on file       Gets together: Not on file       Attends [employer] service: Not on file       Active member of club or organization: Not on file       Attends meetings of clubs or organizations: Not on file       Relationship status: Not on file    Intimate partner violence:       Fear of current or ex partner: Not on file       Emotionally abused: Not on file       Physically abused: Not on file       Forced sexual activity: Not on file   Other Topics Concern    Not on file   Social History Narrative     Disabled since failed back surgery in 2011      In the past worked as an       In the home with spouse and 15year old daughter      [de-identified] year old mother with dementia moving in       Family History   Problem Relation Age of Onset    Heart Disease Mother      Asthma Mother      Diabetes Mother      COPD Mother      Anesth Problems Mother           SEVERE PONV    Cancer Sister           MELANOMA    Asthma Brother      Cancer Father           BREAST    Cancer Sister           BREAST    Heart Attack Brother      Heart Disease Brother      Anesth Problems Brother           VOMITTING    Cancer Paternal Grandmother           KIDNEY         Current Outpatient Medications:     ondansetron (ZOFRAN ODT) 4 mg disintegrating tablet, Take 1 Tab by mouth every eight (8) hours as needed for Nausea. AFTER SURGERY, Disp: 20 Tab, Rfl: 0    polyethylene glycol (MIRALAX) 17 gram/dose powder, Take 17 g by mouth daily. Indications: constipation, Disp: 1530 g, Rfl: 2    fentaNYL (DURAGESIC) 25 mcg/hr PATCH, 1 Patch by TransDERmal route every seventy-two (72) hours. , Disp: , Rfl:     ergocalciferol (VITAMIN D2) 50,000 unit capsule, Take 50,000 Units by mouth every seven (7) days. Indications: ON THURSDAYS, Disp: , Rfl:     topiramate (TOPAMAX) 50 mg tablet, Take 50 mg by mouth nightly., Disp: , Rfl:     calcium citrate-vitamin d3 (CITRACAL+D) 315-200 mg-unit tab, Take 2 Tabs by mouth daily (with breakfast). , Disp: , Rfl:     pantoprazole (PROTONIX) 40 mg tablet, Take 40 mg by mouth nightly., Disp: , Rfl:     HYDROcodone-acetaminophen (NORCO) 5-325 mg per tablet, Take 2 Tabs by mouth every six (6) hours as needed for Pain for up to 30 doses. Max Daily Amount: 8 Tabs. Indications: Pain, Disp: 30 Tab, Rfl: 0    bacitracin zinc (BACITRACIN) ointment, Apply  to affected area two (2) times a day., Disp: 15 g, Rfl: 0    gabapentin (NEURONTIN) 300 mg capsule, Take 900 mg by mouth nightly., Disp: , Rfl:     alendronate (FOSAMAX) 70 mg tablet, Take  by mouth every seven (7) days. SUNDAYS, Disp: , Rfl:     ferrous sulfate 325 mg (65 mg iron) tablet, Take  by mouth Daily (before breakfast). , Disp: , Rfl:     acetaminophen (TYLENOL) 325 mg tablet, Take 2 Tabs by mouth every six (6) hours. Indications: PAIN (Patient taking differently: Take 650 mg by mouth every six (6) hours as needed. Indications: Pain), Disp: 100 Tab, Rfl: 0    HYDROmorphone (DILAUDID) 4 mg tablet, Take 1 Tab by mouth every four (4) hours as needed. Max Daily Amount: 24 mg. Indications: PAIN, Not to be filled. This prescription is for instructional purposes only. Patient has supply at home., Disp: 120 Tab, Rfl: 0    tiotropium (SPIRIVA) 18 mcg inhalation capsule, Take 1 Cap by inhalation daily. , Disp: , Rfl:     cetirizine (ZYRTEC) 10 mg tablet, Take 10 mg by mouth nightly., Disp: , Rfl:     calcium carbonate (TUMS) 200 mg calcium (500 mg) chew, Take 3 Tabs by mouth daily as needed. , Disp: , Rfl:     gabapentin (NEURONTIN) 300 mg capsule, 300 mg two (2) times a day. 7AM,  12NN, Disp: , Rfl: 1    DULoxetine (CYMBALTA) 60 mg capsule, 60 mg two (2) times a day., Disp: , Rfl:     traZODone (DESYREL) 50 mg tablet, Take 50 mg by mouth nightly., Disp: , Rfl: 11    PROAIR HFA 90 mcg/actuation inhaler, Take 2 Puffs by inhalation two (2) times daily as needed. , Disp: , Rfl: 3    NASONEX 50 mcg/actuation nasal spray, 2 Sprays by Both Nostrils route daily. , Disp: , Rfl: 3    montelukast (SINGULAIR) 10 mg tablet, 10 mg nightly., Disp: , Rfl: 3   albuterol (PROVENTIL VENTOLIN) 2.5 mg /3 mL (0.083 %) nebulizer solution, as needed. , Disp: , Rfl: 3    cloNIDine HCl (CATAPRES) 0.1 mg tablet, 0.1 mg three (3) times daily. , Disp: , Rfl: 2    cyanocobalamin 1,000 mcg tablet, Take 1,000 mcg by mouth daily. , Disp: , Rfl:     FOLIC ACID/MULTIVIT-MIN/LUTEIN (CENTRUM SILVER PO), Take 1 Tab by mouth daily. , Disp: , Rfl:         Allergies   Allergen Reactions    Levaquin [Levofloxacin] Anaphylaxis    Bactrim [Sulfamethoprim] Itching    Morphine Other (comments)       PT STATED DOES NOT WORK ON HER      PCP Luis Tay MD     Review of Systems   Constitutional: Positive for malaise/fatigue. HENT: Negative for congestion, hearing loss, sore throat and tinnitus. Eyes: Positive for blurred vision (glasses). Respiratory: Positive for shortness of breath and wheezing (hx asthma well controlled now and has nebulizer at home last used in February ). Negative for cough, hemoptysis and sputum production. Has BiPap and uses only prn due to GERD    Cardiovascular: Positive for leg swelling (prn). Negative for chest pain and palpitations. Gastrointestinal: Positive for heartburn and vomiting (rare and usually reflux ). Negative for abdominal pain, blood in stool, constipation, diarrhea, melena and nausea. Avoids seafood (causes nausea) and \"careful\" with sugar    Genitourinary: Negative for dysuria, flank pain, frequency, hematuria and urgency. Musculoskeletal: Positive for back pain (chronic with sciatica ), joint pain (right hip needs replacement ) and myalgias. Negative for falls and neck pain (hx fusion ). Uses cane most of the time;   Uses scooter prn at  store. Has pain stimulator that is on will turn off day of surgery    Skin:  Scratches from dog on arms    Neurological: Positive for tingling (legs and feet ). Negative for dizziness, seizures, loss of consciousness and headaches.    Endo/Heme/Allergies: Good with vitamins    Psychiatric/Behavioral: Positive for depression. The patient is not nervous/anxious. Pain mgmt Dr. Mcconnell [facility]         Physical Exam   Constitutional: She is oriented to person, place, and time. Visit Vitals  BP 90/64 (BP 1 Location: Right arm, BP Patient Position: At rest)   Pulse 72   Temp 98 °F (36.7 °C)   Resp 14   Ht 5' 5\" (1.651 m)   Wt 257 lb 4.8 oz (116.7 kg)   SpO2 96%   BMI 42.82 kg/m²     White female with obesity    HENT:   Head: Normocephalic. Mouth/Throat: No oropharyngeal exudate. Poor dentition and broken decayed teeth    Eyes: Pupils are equal, round, and reactive to light. No scleral icterus. Neck: Normal range of motion. Neck supple. No JVD present. No tracheal deviation present. No thyromegaly present. Cardiovascular: Normal rate and regular rhythm. Pulmonary/Chest: Effort normal and breath sounds normal. No respiratory distress. She has no wheezes. She has no rales. Abdominal: Soft. Bowel sounds are normal. She exhibits no mass. There is no tenderness. Obese    Musculoskeletal: She exhibits edema (1+ LE). Pain stimulator left flank; ambulating with cane    Lymphadenopathy:     She has no cervical adenopathy. Neurological: She is alert and oriented to person, place, and time. Skin: Skin is warm and dry. Psychiatric: She has a normal mood and affect.      8/21/17   [facility]ago- Gastroduodenoscopy (EGD) Procedure Note     Findings:   A normal Z line was seen at 40 cm. The gastric pouch was 4 cm in length was spherical in shape. There was some retained food in the gastric pouch. Retroflexion was performed within the pouch and was normal appearing. A widely patent GJ anastomosis was located at 44 cm. The retroflexed estimated diameter of the GJ anastomosis was 18-20 mm. No marginal ulceration was seen. The blind end of the jejunal side of the anastomosis was located at 50 cm. The scope was advanced to the distal Amrik limb. No bile reflux was seen.  The JJ anastomosis was not reached.         1/28/19    [place] Lower esophageal sphincter study     LESP =  69.2 mmHg  incomplete relaxation     Lower esophageal body study      Peristaltic contractions=  100%  Amplitudes  111  mmHg      Upper Esophageal body study      UESP=  0.5 mmHg       Impression:    EGJ outflow obstruction         2/20/19  Endoscopic Ultrasound      Endoscopic:              A normal Z line was seen at 40 cm. The gastric pouch was 4 cm in length was spherical in shape. Retroflexion was performed within the pouch and was normal appearing. A widely patent GJ anastomosis was located at 44 cm. The retroflexed estimated diameter of the GJ anastomosis was 18-20 mm. No marginal ulceration was seen. There was suture material visible on the jejunal side of the anastomosis. The blind end of the jejunal side of the anastomosis was located at 50 cm. The scope was advanced to the distal Amrik limb. No bile reflux was seen. The JJ anastomosis was not reached. Ultrasound:              Esophagus: the esophageal wall layers were normal appearing. No mass or infiltrating lesion was appreciated.     Impression:  1. EGJ outflow obstruction  2. Normal appearing esophageal wall layers  3. History of gastric bypass           ASSESSMENT and PLAN      ICD-10-CM ICD-9-CM     1. Morbid obesity with BMI of 45.0-49.9, adult ([facility]) E66.01 278.01       Z68.42 V85.42     2. Gastroesophageal reflux disease, esophagitis presence not specified K21.9 530.81     3. Essential hypertension I10 401. 9     4. YUE (obstructive sleep apnea) G47.33 327.23     5. Muscular dystrophy ([facility] G71.00 359.1     6. Chronic midline low back pain with sciatica, sciatica laterality unspecified M54.40 724. 2       G89.29 724. 3         338.29     7. Opioid use agreement exists Z02.89 V68.89     8. YUE treated with BiPAP G47.33 327.23     9. Chronic, continuous use of opioids F11.90 305.51     10. Sarcoidosis D86.9 135           1.   Morbid obesity:  GERD, weight regain following gastric bypass; no improvement with medical management. She has extremely long afferent segment of Amrik limb which, along with size of gastric pouch (increased parietal cell mass) are resulting in GERD/regurgitation. Her asthma is likewise being exacerbated by this chronic reflux. I do not believe she has achalasia and I believe any myotomy may worsen her GERD symptoms. No hiatal hernia on recent UGI series. Recommended laparoscopic partial gastrectomy with resection of afferent portion of Amrik limb with upper endoscopy. Technical aspects of procedure reviewed along with risks to include but limited to bleeding, infection, VTE, leak, open procedure, ulcer/stricture, recurrent GERD/dysphagia, poor weight loss/weight regain; also discussed post-op diet, activity restrictions, expected results. She understands and desires to proceed. All questions answered.

## 2019-05-23 NOTE — BRIEF OP NOTE
BRIEF OPERATIVE NOTE    Date of Procedure: 5/23/2019   Preoperative Diagnosis: GERD AFTER BYPASS 2003; WEIGHT RE-GAIN  Postoperative Diagnosis: GERD AFTER BYPASS 2003; WEIGHT RE-GAIN; HIATAL HERNIA    Procedure(s):  LAPAROSCOPIC PARTIAL GASTRECTOMY  WITH RESECTION OF SMALL BOWEL , LYSIS OF ADHSIONS , HIATAL HEANIA REPAIR , ENDOSCOPY (E R A S), HIATAL HERNIA REPAIR; ENDOSCOPIC REMOVAL OF FOREIGN BODY  Surgeon(s) and Role:     Do Ashley MD - Primary         Surgical Assistant: Mike Montano    Surgical Staff:  Circ-1: Ronak Choudhury RN; Edmund Mcmillan RN  Physician Assistant: SHERI Baugh  Scrub Tech-1: Karen Candelaria  Scrub Tech-Relief: Pieter Clark  Event Time In Time Out   Incision Start 0801    Incision Close 3706      Anesthesia: General   Estimated Blood Loss: 100 cc  Specimens:   ID Type Source Tests Collected by Time Destination   1 : partial gastrectomy and small bowel Fresh Other                  Danya Kurtz MD 5/23/2019 1140 Pathology      Findings: upper abdominal adhesions, lysed; 8 cm afferent Amrik with dilated gastric pouch; resection of afferent Amrik with partial gastrectomy over 36F bougie; no air leak on endoscopy; endoscopic removal of suture from GJ   Complications: none  Implants:   Implant Name Type Inv.  Item Serial No.  Lot No. LRB No. Used Action   GORE SEAMGUARD BIOABSORBABLE STAPLE LINE REINFORCEMENT Other  N/A GORE [id number] N/A 2 Implanted

## 2019-05-24 ENCOUNTER — APPOINTMENT (OUTPATIENT)
Dept: GENERAL RADIOLOGY | Age: 54
DRG: 327 | End: 2019-05-24
Attending: SURGERY
Payer: COMMERCIAL

## 2019-05-24 LAB
ANION GAP SERPL CALC-SCNC: 6 MMOL/L (ref 5–15)
BASOPHILS # BLD: 0 K/UL (ref 0–0.1)
BASOPHILS NFR BLD: 0 % (ref 0–1)
BUN SERPL-MCNC: 10 MG/DL (ref 6–20)
BUN/CREAT SERPL: 13 (ref 12–20)
CALCIUM SERPL-MCNC: 8.3 MG/DL (ref 8.5–10.1)
CHLORIDE SERPL-SCNC: 107 MMOL/L (ref 97–108)
CO2 SERPL-SCNC: 25 MMOL/L (ref 21–32)
CREAT SERPL-MCNC: 0.76 MG/DL (ref 0.55–1.02)
DIFFERENTIAL METHOD BLD: ABNORMAL
EOSINOPHIL # BLD: 0 K/UL (ref 0–0.4)
EOSINOPHIL NFR BLD: 0 % (ref 0–7)
ERYTHROCYTE [DISTWIDTH] IN BLOOD BY AUTOMATED COUNT: 13.7 % (ref 11.5–14.5)
GLUCOSE BLD STRIP.AUTO-MCNC: 69 MG/DL (ref 65–100)
GLUCOSE SERPL-MCNC: 82 MG/DL (ref 65–100)
HCT VFR BLD AUTO: 43.5 % (ref 35–47)
HGB BLD-MCNC: 13.3 G/DL (ref 11.5–16)
IMM GRANULOCYTES # BLD AUTO: 0.1 K/UL (ref 0–0.04)
IMM GRANULOCYTES NFR BLD AUTO: 1 % (ref 0–0.5)
LYMPHOCYTES # BLD: 0.7 K/UL (ref 0.8–3.5)
LYMPHOCYTES NFR BLD: 12 % (ref 12–49)
MCH RBC QN AUTO: 30 PG (ref 26–34)
MCHC RBC AUTO-ENTMCNC: 30.6 G/DL (ref 30–36.5)
MCV RBC AUTO: 98 FL (ref 80–99)
MONOCYTES # BLD: 0.4 K/UL (ref 0–1)
MONOCYTES NFR BLD: 7 % (ref 5–13)
NEUTS SEG # BLD: 5 K/UL (ref 1.8–8)
NEUTS SEG NFR BLD: 80 % (ref 32–75)
NRBC # BLD: 0 K/UL (ref 0–0.01)
NRBC BLD-RTO: 0 PER 100 WBC
PLATELET # BLD AUTO: 209 K/UL (ref 150–400)
PMV BLD AUTO: 11.3 FL (ref 8.9–12.9)
POTASSIUM SERPL-SCNC: 3.9 MMOL/L (ref 3.5–5.1)
RBC # BLD AUTO: 4.44 M/UL (ref 3.8–5.2)
RBC MORPH BLD: ABNORMAL
SERVICE CMNT-IMP: NORMAL
SODIUM SERPL-SCNC: 138 MMOL/L (ref 136–145)
WBC # BLD AUTO: 6.2 K/UL (ref 3.6–11)

## 2019-05-24 PROCEDURE — 85025 COMPLETE CBC W/AUTO DIFF WBC: CPT

## 2019-05-24 PROCEDURE — 74011636320 HC RX REV CODE- 636/320: Performed by: RADIOLOGY

## 2019-05-24 PROCEDURE — 74011250636 HC RX REV CODE- 250/636: Performed by: SURGERY

## 2019-05-24 PROCEDURE — 74241 XR UPPER GI W KUB/ BA SWALLOW: CPT

## 2019-05-24 PROCEDURE — 74011250637 HC RX REV CODE- 250/637: Performed by: SURGERY

## 2019-05-24 PROCEDURE — 80048 BASIC METABOLIC PNL TOTAL CA: CPT

## 2019-05-24 PROCEDURE — 65660000000 HC RM CCU STEPDOWN

## 2019-05-24 PROCEDURE — 36415 COLL VENOUS BLD VENIPUNCTURE: CPT

## 2019-05-24 PROCEDURE — 82962 GLUCOSE BLOOD TEST: CPT

## 2019-05-24 RX ORDER — TOPIRAMATE 25 MG/1
50 TABLET ORAL
Status: DISCONTINUED | OUTPATIENT
Start: 2019-05-24 | End: 2019-05-25 | Stop reason: HOSPADM

## 2019-05-24 RX ORDER — TRAZODONE HYDROCHLORIDE 50 MG/1
50 TABLET ORAL
Status: DISCONTINUED | OUTPATIENT
Start: 2019-05-24 | End: 2019-05-25 | Stop reason: HOSPADM

## 2019-05-24 RX ORDER — HYDROMORPHONE HYDROCHLORIDE 2 MG/1
2-4 TABLET ORAL
Qty: 30 TAB | Refills: 0 | Status: SHIPPED | OUTPATIENT
Start: 2019-05-24 | End: 2019-05-25 | Stop reason: SDUPTHER

## 2019-05-24 RX ORDER — CETIRIZINE HCL 10 MG
10 TABLET ORAL
Status: DISCONTINUED | OUTPATIENT
Start: 2019-05-24 | End: 2019-05-25 | Stop reason: HOSPADM

## 2019-05-24 RX ORDER — CLONIDINE HYDROCHLORIDE 0.1 MG/1
0.1 TABLET ORAL 3 TIMES DAILY
Status: DISCONTINUED | OUTPATIENT
Start: 2019-05-24 | End: 2019-05-25 | Stop reason: HOSPADM

## 2019-05-24 RX ORDER — KETOROLAC TROMETHAMINE 30 MG/ML
15 INJECTION, SOLUTION INTRAMUSCULAR; INTRAVENOUS EVERY 6 HOURS
Status: DISCONTINUED | OUTPATIENT
Start: 2019-05-24 | End: 2019-05-25 | Stop reason: HOSPADM

## 2019-05-24 RX ADMIN — CLONIDINE HYDROCHLORIDE 0.1 MG: 0.1 TABLET ORAL at 21:18

## 2019-05-24 RX ADMIN — TOPIRAMATE 50 MG: 25 TABLET, FILM COATED ORAL at 21:18

## 2019-05-24 RX ADMIN — KETOROLAC TROMETHAMINE 15 MG: 30 INJECTION, SOLUTION INTRAMUSCULAR at 09:07

## 2019-05-24 RX ADMIN — CLONIDINE HYDROCHLORIDE 0.1 MG: 0.1 TABLET ORAL at 16:00

## 2019-05-24 RX ADMIN — Medication 10 ML: at 14:00

## 2019-05-24 RX ADMIN — KETOROLAC TROMETHAMINE 15 MG: 30 INJECTION, SOLUTION INTRAMUSCULAR at 13:33

## 2019-05-24 RX ADMIN — GABAPENTIN 200 MG: 100 CAPSULE ORAL at 09:07

## 2019-05-24 RX ADMIN — HYOSCYAMINE SULFATE 0.12 MG: 0.12 TABLET ORAL at 21:18

## 2019-05-24 RX ADMIN — HYDROMORPHONE HYDROCHLORIDE 2 MG: 2 TABLET ORAL at 13:33

## 2019-05-24 RX ADMIN — GABAPENTIN 200 MG: 100 CAPSULE ORAL at 17:19

## 2019-05-24 RX ADMIN — TRAZODONE HYDROCHLORIDE 50 MG: 50 TABLET ORAL at 21:18

## 2019-05-24 RX ADMIN — HYDROMORPHONE HYDROCHLORIDE 2 MG: 2 TABLET ORAL at 17:19

## 2019-05-24 RX ADMIN — HYDROMORPHONE HYDROCHLORIDE 2 MG: 2 TABLET ORAL at 05:13

## 2019-05-24 RX ADMIN — IOHEXOL 50 ML: 350 INJECTION, SOLUTION INTRAVENOUS at 08:47

## 2019-05-24 RX ADMIN — HYOSCYAMINE SULFATE 0.12 MG: 0.12 TABLET ORAL at 05:37

## 2019-05-24 RX ADMIN — HYDROMORPHONE HYDROCHLORIDE 2 MG: 2 TABLET ORAL at 09:07

## 2019-05-24 RX ADMIN — CETIRIZINE HYDROCHLORIDE 10 MG: 10 TABLET, FILM COATED ORAL at 21:18

## 2019-05-24 RX ADMIN — ACETAMINOPHEN 1000 MG: 500 TABLET ORAL at 13:32

## 2019-05-24 RX ADMIN — KETOROLAC TROMETHAMINE 15 MG: 30 INJECTION, SOLUTION INTRAMUSCULAR at 19:08

## 2019-05-24 RX ADMIN — Medication 10 ML: at 21:27

## 2019-05-24 RX ADMIN — ACETAMINOPHEN 1000 MG: 500 TABLET ORAL at 17:19

## 2019-05-24 RX ADMIN — FLUTICASONE PROPIONATE 2 SPRAY: 50 SPRAY, METERED NASAL at 09:08

## 2019-05-24 RX ADMIN — HYOSCYAMINE SULFATE 0.12 MG: 0.12 TABLET ORAL at 01:28

## 2019-05-24 NOTE — PROGRESS NOTES
Patient is up in the recliner participating in her drinking challenge. The pt has tolerated 3 rounds of water. I have added the protein drink and will continue to monitor tolerance. The patient verbalizes an understanding of \"sipping\" the cups.

## 2019-05-24 NOTE — PROGRESS NOTES
Progress Note    Patient: Isaias Crowder MRN: [id number]  SSN: [id number]    YOB: 1965  Age: 49 y.o. Sex: female      Admit Date: 2019    1 Day Post-Op    Procedure:  Procedure(s):  LAPAROSCOPIC PARTIAL GASTRECTOMY  WITH RESECTION OF SMALL BOWEL , LYSIS OF ADHSIONS , HIATAL HERNIA REPAIR , ENDOSCOPY (E R A S), ENDOSCOPIC REMOVAL OF FOREIGN BODY    Subjective:     Patient complains of RUQ incisional pain; ambulating, tolerating ice chips; no nausea or GERD symptoms. Objective:     Visit Vitals  /90 (BP 1 Location: Right arm, BP Patient Position: At rest)   Pulse 76   Temp 98 °F (36.7 °C)   Resp 16   Ht 5' 5\" (1.651 m)   Wt 261 lb 11.2 oz (118.7 kg)   SpO2 98%   BMI 43.55 kg/m²       Temp (24hrs), Av.8 °F (36.6 °C), Min:97.4 °F (36.3 °C), Max:98.3 °F (36.8 °C)      Physical Exam:    LUNG: clear to auscultation bilaterally, HEART: regular rate and rhythm, S1, S2 normal, no murmur. ABDOMEN: Obese, non-distended, soft. Wounds dry and intact. Sero-sanguinous drain fluid. Appropriate incisional pain with palpation. Data Review: VS, I/O's, Labs    Lab Review:   Recent Results (from the past 12 hour(s))   CBC WITH AUTOMATED DIFF    Collection Time: 19  2:10 AM   Result Value Ref Range    WBC 6.2 3.6 - 11.0 K/uL    RBC 4.44 3.80 - 5.20 M/uL    HGB 13.3 11.5 - 16.0 g/dL    HCT 43.5 35.0 - 47.0 %    MCV 98.0 80.0 - 99.0 FL    MCH 30.0 26.0 - 34.0 PG    MCHC 30.6 30.0 - 36.5 g/dL    RDW 13.7 11.5 - 14.5 %    PLATELET 615 455 - 765 K/uL    MPV 11.3 8.9 - 12.9 FL    NRBC 0.0 0  WBC    ABSOLUTE NRBC 0.00 0.00 - 0.01 K/uL    NEUTROPHILS 80 (H) 32 - 75 %    LYMPHOCYTES 12 12 - 49 %    MONOCYTES 7 5 - 13 %    EOSINOPHILS 0 0 - 7 %    BASOPHILS 0 0 - 1 %    IMMATURE GRANULOCYTES 1 (H) 0.0 - 0.5 %    ABS. NEUTROPHILS 5.0 1.8 - 8.0 K/UL    ABS. LYMPHOCYTES 0.7 (L) 0.8 - 3.5 K/UL    ABS. MONOCYTES 0.4 0.0 - 1.0 K/UL    ABS. EOSINOPHILS 0.0 0.0 - 0.4 K/UL    ABS.  BASOPHILS 0.0 0.0 - 0.1 K/UL ABS. IMM. GRANS. 0.1 (H) 0.00 - 0.04 K/UL    DF SMEAR SCANNED      RBC COMMENTS ANISOCYTOSIS  1+             Assessment:     Hospital Problems  Date Reviewed: 2019          Codes Class Noted POA    Morbid obesity ([facility] ICD-10-CM: E66.01  ICD-9-CM: 278.01  2019 Unknown              Plan/Recommendations/Medical Decision Makin. UGI this AM-if negative advance diet to bariatric liquids with goal 4 oz/hr. 2. K-Pad to RUQ. 3. Ambulation, spirometry.

## 2019-05-24 NOTE — PROGRESS NOTES
Patient is tolerating the liquid diet with protein shake. Pt walked 2 laps at 1300. Pt has been in the recliner drinking from 0730 until present.   I have called 3 times about getting a K-pad, but the hospital is out of stock for patients

## 2019-05-24 NOTE — PROGRESS NOTES
Attempted to draw morning lab work, able to obtain CBC, but laboratory reported to primary RN at 5284 that metabolic panel was insufficient. 2 nurses attempted 4 times to draw metabolic panel, unsuccessfully. Paged on-call Dr. Sebas Stuart, orders received to obtain arterial stick. [phone], paged respiratory therapy for arterial stick. Bedside shift change report given to [address] (oncoming nurse) by Sapna Ceballos (offgoing nurse). Report included the following information SBAR, Kardex and MAR.

## 2019-05-24 NOTE — PROGRESS NOTES
Bedside and Verbal shift change report given to Trenton Esquivel RN (oncoming nurse) by Vicente Sosa RN (offgoing nurse). Report included the following information SBAR, Kardex, OR Summary, Procedure Summary, Intake/Output, MAR and Recent Results.

## 2019-05-24 NOTE — PROGRESS NOTES
NUTRITION     Chart reviewed. Post-op bariatric diet instruction completed. Will gladly follow up for additional questions as needed. Thank you.      Heriberto Ribera RD

## 2019-05-24 NOTE — DISCHARGE INSTRUCTIONS
Gastric Bypass  Patient Discharge Instructions  [facility]  [phone]    1. Activities: You may be active walking, stair climbing, and doing light weight activities with one to two-pound weights, sitting in a chair using different arm motions. Major restrictions include driving until your first office visit and lifting anything heavier than ten pounds. It is very important that you walk frequently. In addition to walking, you should continue to use your incentive spirometer (breathing exercises) throughout the day. 2. Caring for vour incision (s}: Your surgical incision(s) will be covered with Derma bond (super glue). You may shower as desired and simply pat dry the area over the incision(s). There may occasionally be seepage of yellowish to yellowish-maroon dissolved fat from your wound, which is of no major concern as long as the wound is not red, hardened in the area of the drainage, or if the drainage has a foul smell. If there are any questions, call our office for further instructions. If there is this type of dissolved fat drainage,  the shower and gently express the fluid from your wound. Then keep gauze pads over the area to protect both the wound and your clothes. 3. When to call the office immediately:      *Chest pain (not associated with eating/drinking)  *Shortness of breath (more than normal)  *Sudden pain and/or redness in calf  *Fever greater than 101F  *Persistent nausea and/or vomiting (unable to keep down any liquids)  *Bleeding from incision(s)  *Severe abdominal pain  *Any other concerning symptoms    4. Diet: There are three main priorities as far as your diet is concerned---    a. Clear Liquids-drink from 1 oz. cups or standard shot glass. These liquids are non-carbonated, no added sugar, and not irritating to your stomach.  They may include water, tea, coffee\" 100% no added sugar juices (avoid citrus) , clear broth, blenderized clear soups such as [employer]' Healthy Request Chicken Noodle and Chicken & Rice, Sugar-free products including popsicles, Evan-Aid, and Crystal Lite. b. Vitamins: Multi-vitamin with iron in the morning and evening, making sure  it's not the first thing or the last thing taken. The other Vitamin B-12 and  vitamin A & D capsule may be taken once during the day anytime that you  can make a routine of it.    c. Protein Intake: During your initial two-three weeks when your body is switching from burning glucose to directly burning fat, there is an attempt by your body to use protein as a fuel. To protect that protein, we like you to exercise as listed above, and to try to take in 50-60 grams of protein per day. This can be done with four 8 oz. serving of skim milk and Low Carb [employer] Instant Breakfast. Each 8 oz. should be considered a meal-breakfast, lunch, or supper, and during this mealtime it should be the sole ingested substance. Stop your clear liquids for 20 minutes before your start on the instant breakfast, which is sipped 1 oz. at a time. You may also try the following substitute for [employer] Instant Breakfast: skim milk-fat free powdered milk + Egg Beaters + flavor extract. If desired, ice may be added and blenderized in the . If the milk upsets your stomach, you may purchase Lactaid tablets from any drug store. Lactaid skim milk may be purchased at most major supermarkets. Another alternative is Boost Diabetic, which is Lactose-free and ready to drink. There are many protein supplements on the market. Whey and Soy based protein powders/drinks are acceptable. If you have any questions about specific products please call the office. If you are having difficulty with your diet, please call the office. 5. Medications: Take no more than 2 pills at a time. Wait 20 minutes between pills. a. Vitamins as listed above---multi-vitamin with iron twice a day, B-12 once a day, vitamin A& D once a day.     b. Acid reducing medicine--Will be prescribed by your surgeon at discharge. c. Mylanta Plus--one to two teaspoons as needed for belching or gas (other gas relieving medicines are also acceptable Beano, GasX, etc). d. Bowel Regulation--mild laxatives are permissible such as Milk of Magnesia, Dulcolax (either by mouth or suppository). If you are accustomed to using a [id number] laxative not mentioned, you may continue to use it. Fibercon tablets or Benefiber may be taken as a fiber supplement to regulate bowel movements. Fibercon tablets should be broken in half and taken in half and taken one half in the morning and one half in the evening. Benefiber (1 tsp.) can be added to your protein drink(s). It has no taste or added thickness. Imodium AD may be taken as needed for diarrhea.    e. Pain medication-one to two every four hours as needed for pain control. If pain is mild, try extra-strength Tylenol first.    f. Do not take any pain or arthritis medication such as Aspirin, Advil, Aleve, Naprosyn, or any other nonsteroidal anti-inflammatory medication unless approved by your surgeon.    A Tylenol product is OK.    g. Preadmission medications may be resumed, but this should be discussed without your doctor before your discharge from the hospital.    Future Appointments   Date Time Provider Pérez Paz   5/30/2019 11:20 AM GUILLAUME Collier   6/6/2019 11:20 AM GUILLAUME Collier   6/20/2019 11:20 AM GUILLAUME Collier   7/3/2019 11:20 AM GUILLAUME Mcdermott

## 2019-05-24 NOTE — PROGRESS NOTES
Care Management Interventions  PCP Verified by CM: Yes  Palliative Care Criteria Met (RRAT>21 & CHF Dx)?: No  Transition of Care Consult (CM Consult): (No consults at this time)  MyChart Signup: No  Discharge Durable Medical Equipment: No  Health Maintenance Reviewed: Yes  Physical Therapy Consult: No  Occupational Therapy Consult: No  Speech Therapy Consult: No  Current Support Network: Own Home, Lives with Spouse  Confirm Follow Up Transport: Family   Resource Information Provided?: No  Discharge Location  Discharge Placement: Home with family assistance    Reason for Admission:   Laparoscopic small bowel resection, Laparoscopic partial gastrectomy, Hiatal hernia repair                    RRAT Score:  10                   Plan for utilizing home health:   Not indicated at this time                       Current Advanced Directive/Advance Care Plan: No advanced directive,  is next of kin    Likelihood of Readmission:  low                         Transition of Care Plan:      Patient admitted for the above procedures, is doing well with her drinking challenge. Patient is expected to discharge home once medically stable; no CM needs anticipated at this time. Cm will be available if needed.   Sangita Scott BSW, ACM

## 2019-05-24 NOTE — OP NOTES
[address]  OPERATIVE REPORT    Name:  Duncan Green  MR#:  [id number]  :  1965  ACCOUNT #:  [de-identified]  DATE OF SERVICE:  2019      PREOPERATIVE DIAGNOSES:  1. Gastroesophageal reflux disease following gastric bypass. 2.  Weight regain following gastric bypass. POSTOPERATIVE DIAGNOSES:  1. Gastroesophageal reflux following gastric bypass. 2.  Weight regain following gastric bypass. 3.  Hiatal hernia. 4.  Intragastric foreign body. PROCEDURES PERFORMED:  1. Laparoscopic small bowel resection. 2.  Laparoscopic partial gastrectomy. 3.  Hiatal hernia repair. 4.  Laparoscopic lysis of adhesions. 5.  Intraoperative upper endoscopy with endoscopic removal of foreign body. SURGEON:  Stevie Shipley MD    ASSISTANT:  SHERI Mcdowell.    ANESTHESIA:  General endotracheal.    DRAIN:  A 19-mm Adolfo drain. SPONGE COUNT:  Correct. NEEDLE COUNT:  Correct. COMPLICATIONS:  None. SPECIMENS REMOVED:  1. Segment of small intestine. 2.  Partial gastrectomy. IMPLANTS:  None. ESTIMATED BLOOD LOSS:  100 mL. INDICATIONS:  The patient is a 26-year-old white female with a history of morbid obesity, managed through laparoscopic gastric bypass in  at [facility]. Over the years, she has developed weight regain with worsening gastroesophageal reflux disease and re-emergence of comorbidities. Workup to include upper gastrointestinal series, upper endoscopy, and manometry reveal a short, spherical pouch with a large volume and an enlarged gastrojejunal anastomosis. Manometry reveals motility disorder, and most recent upper gastrointestinal series does not reveal a hiatal hernia. Upper gastrointestinal series also reveals an enlarged afferent segment of the Amrik limb with spontaneous reflux.   She was taken to the operating room today for laparoscopic partial gastrectomy, resection of the afferent segment of the Amrik limb, and intraoperative upper endoscopy. FINDINGS:  1. Dense upper abdominal adhesion, lysed. 2.  An 8-cm afferent segment of the Amrik limb with dilated gastric pouch. 3.  Dissection of afferent segment of the Amrik limb with partial gastrectomy over a 36-Spanish bougie. 4.  No insufflation, air leak, or hemorrhage on upper endoscopy. 5.  Endoscopic removal of retained suture within the lumen at the level of gastrojejunal anastomosis. PROCEDURE:  The patient was identified as the correct patient in the preoperative holding area and informed consent was confirmed. After answering the patient's remaining questions, she was taken to the operating room and placed on the operating room table in the supine position. Sequential compression devices were placed on both lower extremities. Following the uneventful initiation of general anesthesia, a Kendrick catheter was placed within her bladder, and she was carefully secured to the operating room table with safety strap and footboard in place. All potential pressure points were padded with eggcrate. Her abdomen was prepped and draped in the usual sterile fashion. Final time-out was performed, and it was confirmed she had received intravenous antibiotics. A 5-mm trocar was inserted through a small right upper quadrant skin incision using an Optiview technique. After confirming intraperitoneal location of the trocar tip, insufflation with carbon dioxide gas was initiated. Once adequate working sites had developed, the 5-mm, 30-degree laparoscope was inserted. No signs of trocar injury were present. The small bowel was inspected and noted to be of normal caliber. A 5-mm trocar was inserted 4 cm superior to the umbilicus using visual guidance with the laparoscope. Left upper quadrant 5-mm trocar was inserted using identical technique. The right upper quadrant 12-mm trocar was inserted using visual guidance with the laparoscope.   The [facility] liver retractor was inserted through a small subxiphoid incision. With the edge of the liver retracted anteriorly and cephalad, adhesions from prior gastric bypass were identified. Adhesions between the undersurface of the left lobe of the liver and underlying gastric pouch were taken down using the Harmonic Scalpel. This allowed the liver retracted to be advanced to the level of the diaphragm. The gastrocolic ligament was incised along the greater curve of the stomach using the Harmonic Scalpel. This allowed atraumatic entry into the retrogastric area of the lesser sac. The retrocolic, retrogastric Amrik limb was identified. The efferent and afferent segments of the Amrik limb were distinguished. The afferent limb was freed from surrounding adhesions using the Harmonic Scalpel and blunt dissection. This allowed following of the afferent segment of the Amrik limb to the level of the gastrojejunal anastomosis. Additional dissection anteriorly allowed development of a plane between the lateral aspect of the gastric pouch, which was noted to be enlarged and the excluded stomach. This plane was developed cephalad towards the left blane of the diaphragm. Once the pouch had been completely freed from the excluded stomach, the pars flaccida portion of the gastrohepatic ligament was incised, allowing atraumatic entry into the lesser sac along the lesser curve. The base of the right blane of the diaphragm was identified, and a plane was developed between the medial border of the right blane of the diaphragm and the viscera. A hiatal hernia was identified, and hernia sac and the upper stomach were freed from the medial border of the right blane of the diaphragm, proceeding posterior to anterior. This plane was then carried anterior to the esophagus and gastroesophageal junction, to the level of the left blane of the diaphragm.   Remaining adhesions between the medial border of the left blane of the diaphragm and the viscera were taken down using the Harmonic Scalpel. The hiatal hernia was then repaired with a figure-of-eight 0 Surgidac suture. Given the 8-cm length of afferent Amrik limb, which was enlarged and abnormal, along with the enlarged, spherical nature of the pouch, decision was made to resect the afferent segment of the Amrik limb to be followed by resection of the lateral aspect of the gastric pouch. A 36-Portuguese bougie was passed transorally, through the gastroesophageal junction, and into the efferent segment of the Amrik limb. The blood supply to the  afferent segment of the Amrik limb was then taken down using the Harmonic Scalpel. This segment of bowel was then dissected using a tan load linear stapler. The bowel was placed in the right upper quadrant for later retrieval.  The redundant portion of the gastric pouch was then resected over the bougie with 2 firings of purple load linear stapler firings, utilizing Seamguard reinforcement material.  The specimen was then placed adjacent to the segment of bowel for later retrieval.  The bougie was removed, and the patient was placed in the supine position. Sterile saline was instilled into the upper abdomen, an intestinal clamp was placed on the Amrik limb below the gastrojejunal anastomosis. Intraoperative upper endoscopy was performed. The gastroscope was passed transorally, through the gastroesophageal junction, and into the gastric pouch. The gastric pouch was noted to be approximately 3 cm in length and narrow. The gastrojejunal anastomosis was noted to be of normal size without bleeding. A nonabsorbable suture was identified passing medial to lateral across the anastomosis. Biopsy forceps were used to attempt to remove this bluntly, but given resistance, the suture was then divided using endoscopic markus and removed from the patient's body. No insufflation or air leak occurred during insufflation. No intraluminal hemorrhage was identified.   The stomach was decompressed and the gastroscope was removed. Sterile saline was evacuated from the upper abdomen. The intestinal clamp was removed. A 19-mm Adolfo drain was inserted into the abdominal space after removal of the specimens with a retrieval bag. The drain was brought out through the left subcostal 5-mm trocar wound and secured in position with a 2-0 nylon suture. After confirming adequate hemostasis, the Shiva liver retractor was removed, followed by closure of the 12-mm fascial defect using a 0 Vicryl suture with a laparoscopic suture passer. Pneumoperitoneum was released, and all trocars were removed. All wounds were infiltrated with 0.5% Marcaine without epinephrine. All skin edges were reapproximated with a combination of subcuticular 4-0 Monocryl suture and Dermabond. The patient tolerated the procedure well. She was extubated in the operating room and transported to the recovery area in stable condition. The attending surgeon, Dr. Tessy Ramos, was scrubbed and present for the entire procedure.       Fabiano Plunkett MD      [id number]  D:  05/23/2019 18:49  T:  05/23/2019 18:57  JOB #:  [id number]

## 2019-05-25 VITALS
SYSTOLIC BLOOD PRESSURE: 104 MMHG | BODY MASS INDEX: 44.15 KG/M2 | HEART RATE: 72 BPM | WEIGHT: 265 LBS | RESPIRATION RATE: 16 BRPM | DIASTOLIC BLOOD PRESSURE: 64 MMHG | HEIGHT: 65 IN | OXYGEN SATURATION: 93 % | TEMPERATURE: 98 F

## 2019-05-25 PROCEDURE — 74011250636 HC RX REV CODE- 250/636: Performed by: SURGERY

## 2019-05-25 PROCEDURE — 74011250637 HC RX REV CODE- 250/637: Performed by: SURGERY

## 2019-05-25 RX ORDER — HYDROMORPHONE HYDROCHLORIDE 2 MG/1
2-4 TABLET ORAL
Qty: 30 TAB | Refills: 0 | Status: SHIPPED | OUTPATIENT
Start: 2019-05-25 | End: 2019-05-28

## 2019-05-25 RX ADMIN — HYDROMORPHONE HYDROCHLORIDE 2 MG: 2 TABLET ORAL at 02:08

## 2019-05-25 RX ADMIN — Medication 10 ML: at 06:34

## 2019-05-25 RX ADMIN — KETOROLAC TROMETHAMINE 15 MG: 30 INJECTION, SOLUTION INTRAMUSCULAR at 02:03

## 2019-05-25 RX ADMIN — HYDROMORPHONE HYDROCHLORIDE 2 MG: 2 TABLET ORAL at 07:49

## 2019-05-25 RX ADMIN — HYOSCYAMINE SULFATE 0.12 MG: 0.12 TABLET ORAL at 06:30

## 2019-05-25 RX ADMIN — HYOSCYAMINE SULFATE 0.12 MG: 0.12 TABLET ORAL at 11:24

## 2019-05-25 RX ADMIN — ACETAMINOPHEN 1000 MG: 500 TABLET ORAL at 11:24

## 2019-05-25 RX ADMIN — ACETAMINOPHEN 1000 MG: 500 TABLET ORAL at 06:30

## 2019-05-25 RX ADMIN — KETOROLAC TROMETHAMINE 15 MG: 30 INJECTION, SOLUTION INTRAMUSCULAR at 07:45

## 2019-05-25 RX ADMIN — CLONIDINE HYDROCHLORIDE 0.1 MG: 0.1 TABLET ORAL at 09:56

## 2019-05-25 RX ADMIN — ACETAMINOPHEN 1000 MG: 500 TABLET ORAL at 02:04

## 2019-05-25 RX ADMIN — GABAPENTIN 200 MG: 100 CAPSULE ORAL at 09:56

## 2019-05-25 NOTE — PROGRESS NOTES
Progress Note    Patient: Ravinder Martinez MRN: [id number]  SSN: [id number]    YOB: 1965  Age: 49 y.o. Sex: female      Admit Date: 2019    2 Days Post-Op    Procedure:  Procedure(s):  LAPAROSCOPIC PARTIAL GASTRECTOMY  WITH RESECTION OF SMALL BOWEL , LYSIS OF ADHSION , HIATAL HEANIA REPAIR , ENDOSCOPY (E R A S), HIATAL HERNIA REPAIR  ESOPHAGOGASTRODUODENOSCOPY (EGD)    Subjective:     Patient tolerating 4 oz of liquids an hour. She has been walking the halls. She still complains of pain in the RUQ. This is unchanged. Objective:     Visit Vitals  /69 (BP 1 Location: Right arm, BP Patient Position: Sitting)   Pulse 72   Temp 98.1 °F (36.7 °C)   Resp 16   Ht 5' 5\" (1.651 m)   Wt 265 lb (120.2 kg)   SpO2 97%   BMI 44.10 kg/m²       Temp (24hrs), Av.3 °F (36.8 °C), Min:98 °F (36.7 °C), Max:98.7 °F (37.1 °C)      Physical Exam:    Gen- Alert in NAD  Abd- Soft with mild tenderness in the RUQ. Incisions intact  TARAH serosanginous     Data Review: images and reports reviewed    Lab Review: All lab results for the last 24 hours reviewed.   Recent Results (from the past 24 hour(s))   METABOLIC PANEL, BASIC    Collection Time: 19 11:20 AM   Result Value Ref Range    Sodium 138 136 - 145 mmol/L    Potassium 3.9 3.5 - 5.1 mmol/L    Chloride 107 97 - 108 mmol/L    CO2 25 21 - 32 mmol/L    Anion gap 6 5 - 15 mmol/L    Glucose 82 65 - 100 mg/dL    BUN 10 6 - 20 MG/DL    Creatinine 0.76 0.55 - 1.02 MG/DL    BUN/Creatinine ratio 13 12 - 20      GFR est AA >60 >60 ml/min/1.73m2    GFR est non-AA >60 >60 ml/min/1.73m2    Calcium 8.3 (L) 8.5 - 10.1 MG/DL   GLUCOSE, POC    Collection Time: 19 11:42 AM   Result Value Ref Range    Glucose (POC) 69 65 - 100 mg/dL    Performed by Guero Calvillo        Assessment:     Hospital Problems  Date Reviewed: 2019          Codes Class Noted POA    [facility] ICD-10-CM: E66.01  ICD-9-CM: 278.01  2019 Unknown Plan/Recommendations/Medical Decision Making:   S/p revision GBP and hiatal hernia repair. Doing well. She states that she can deal with the pain at home. Will d/c drain  Discharge home.

## 2019-05-25 NOTE — PROGRESS NOTES
Bedside shift change report given to Cy Jones (oncoming nurse) by Ashley Tovar (offgoing nurse). Report included the following information SBAR, Kardex, Intake/Output, MAR and Accordion.

## 2019-05-25 NOTE — PROGRESS NOTES
Discharge instructions and medication information discussed with pt. Opportunity for questions provided. Peripheral IV removed without complication. Pt belongings packed and sent with pt. Pt transported via wheelchair by pct to discharge lot. Pt stable and in no acute distress at time of discharge.

## 2019-05-28 ENCOUNTER — TELEPHONE (OUTPATIENT)
Dept: SURGERY | Age: 54
End: 2019-05-28

## 2019-05-28 NOTE — TELEPHONE ENCOUNTER
----- Message from Naomie Daniel LPN sent at 3/75/1639  9:02 AM EDT -----  Regardin day post op call      ----- Message -----  From: Adolfo Lara  Sent: 2019   8:52 AM  To: Naomie Daniel LPN  Subject: Discharge Phone Call                             Hello,    Please call Ms. Hector for her discharge phone call. Thank you!   Monique

## 2019-05-29 NOTE — DISCHARGE SUMMARY
Physician Discharge Summary     Patient ID:  Little Lloyd  [id number]  89 y.o.  1965    Allergies: Levaquin [levofloxacin]; Bactrim [sulfamethoprim]; and Morphine    Admit Date: 5/23/2019    Discharge Date: 5/25/2019    * Admission Diagnoses: Morbid obesity ([facility] [E66.01]    * Discharge Diagnoses:    Hospital Problems as of 5/25/2019 Date Reviewed: 5/14/2019          Codes Class Noted - Resolved POA    Morbid obesity ([facility] ICD-10-CM: E66.01  ICD-9-CM: 278.01  5/23/2019 - Present Unknown               Admission Condition: Good    * Discharge Condition: good    * Procedures: Procedure(s):  LAPAROSCOPIC PARTIAL GASTRECTOMY  WITH RESECTION OF SMALL BOWEL , LYSIS OF ADHSION , HIATAL HEANIA REPAIR , ENDOSCOPY (E R A S), HIATAL HERNIA REPAIR  ESOPHAGOGASTRODUODENOSCOPY (EGD)    * Hospital Course:   Normal hospital course for this procedure. Consults: None    Significant Diagnostic Studies: radiology: POD#1 UGI: no leak or obstruction    * Disposition: Home    Discharge Medications:   Discharge Medication List as of 5/25/2019 12:09 PM      CONTINUE these medications which have CHANGED    Details   !! HYDROmorphone (DILAUDID) 2 mg tablet Take 1-2 Tabs by mouth every four (4) hours as needed for Pain for up to 3 days. Max Daily Amount: 24 mg., Print, Disp-30 Tab, R-0       !! - Potential duplicate medications found. Please discuss with provider. CONTINUE these medications which have NOT CHANGED    Details   ondansetron (ZOFRAN ODT) 4 mg disintegrating tablet Take 1 Tab by mouth every eight (8) hours as needed for Nausea. AFTER SURGERY, Normal, Disp-20 Tab, R-0      polyethylene glycol (MIRALAX) 17 gram/dose powder Take 17 g by mouth daily. Indications: constipation, Normal, Disp-1530 g, R-2      fentaNYL (DURAGESIC) 25 mcg/hr PATCH 1 Patch by TransDERmal route every seventy-two (72) hours. , Historical Med      topiramate (TOPAMAX) 50 mg tablet Take 50 mg by mouth nightly., Historical Med      calcium citrate-vitamin d3 (CITRACAL+D) 315-200 mg-unit tab Take 2 Tabs by mouth daily (with breakfast). , Historical Med      pantoprazole (PROTONIX) 40 mg tablet Take 40 mg by mouth nightly., Historical Med      !! gabapentin (NEURONTIN) 300 mg capsule Take 900 mg by mouth nightly., Historical Med      ferrous sulfate 325 mg (65 mg iron) tablet Take  by mouth Daily (before breakfast). , Historical Med      acetaminophen (TYLENOL) 325 mg tablet Take 2 Tabs by mouth every six (6) hours. Indications: PAIN, No Print, Disp-100 Tab, R-0      !! HYDROmorphone (DILAUDID) 4 mg tablet Take 1 Tab by mouth every four (4) hours as needed. Max Daily Amount: 24 mg. Indications: PAIN, Not to be filled. This prescription is for instructional purposes only. Patient has supply at home., No Print, Disp-120 Tab, R-0      tiotropium (SPIRIVA) 18 mcg inhalation capsule Take 1 Cap by inhalation daily. , Historical Med      cetirizine (ZYRTEC) 10 mg tablet Take 10 mg by mouth nightly., Historical Med      calcium carbonate (TUMS) 200 mg calcium (500 mg) chew Take 3 Tabs by mouth daily as needed., Historical Med      !! gabapentin (NEURONTIN) 300 mg capsule 300 mg two (2) times a day. 7AM,  12NN, Historical Med, R-1      DULoxetine (CYMBALTA) 60 mg capsule 60 mg two (2) times a day., Historical Med      traZODone (DESYREL) 50 mg tablet Take 50 mg by mouth nightly., Historical Med, R-11      PROAIR HFA 90 mcg/actuation inhaler Take 2 Puffs by inhalation two (2) times daily as needed., Historical Med, R-3      NASONEX 50 mcg/actuation nasal spray 2 Sprays by Both Nostrils route daily. , Historical Med, R-3      montelukast (SINGULAIR) 10 mg tablet 10 mg nightly., Historical Med, R-3      albuterol (PROVENTIL VENTOLIN) 2.5 mg /3 mL (0.083 %) nebulizer solution as needed., Historical Med, R-3      cloNIDine HCl (CATAPRES) 0.1 mg tablet 0.1 mg three (3) times daily. , Historical Med, R-2      FOLIC ACID/MULTIVIT-MIN/LUTEIN (CENTRUM SILVER PO) Take 1 Tab by mouth daily. , Historical Med      enoxaparin (LOVENOX) 40 mg/0.4 mL 0.4 mL by SubCUTAneous route daily. AFTER SURGERY  Indications: Deep Vein Thrombosis Prevention, Normal, Disp-14 Syringe, R-0      ergocalciferol (VITAMIN D2) 50,000 unit capsule Take 50,000 Units by mouth every seven (7) days. Indications: ON THURSDAYS, Historical Med      HYDROcodone-acetaminophen (NORCO) 5-325 mg per tablet Take 2 Tabs by mouth every six (6) hours as needed for Pain for up to 30 doses. Max Daily Amount: 8 Tabs. Indications: Pain, Print, Disp-30 Tab, R-0      bacitracin zinc (BACITRACIN) ointment Apply  to affected area two (2) times a day., Normal, Disp-15 g, R-0      alendronate (FOSAMAX) 70 mg tablet Take  by mouth every seven (7) days. SUNDAYS, Historical Med      cyanocobalamin 1,000 mcg tablet Take 1,000 mcg by mouth daily. , Historical Med       !! - Potential duplicate medications found. Please discuss with provider. * Follow-up Care/Patient Instructions:   Activity: No heavy lifting, pushing, pulling x 4 weeks  Diet: bariatric liquids  Wound Care: Keep wounds clean and dry    Follow-up Information     Follow up With Specialties Details Why Contact Info    Janina Valadez MD [facility]   [address] [place] [id number]  [phone]            Future Appointments   Date Time Provider Pérez Paz   5/30/2019 11:20 AM Waldo Owens NP [facility] SCHED   6/6/2019 11:20 AM Waldo Owens NP [facility] SCHED   6/20/2019 11:20 AM Waldo Owens NP [facility] SCHED   7/3/2019 11:20 AM Haile Chandler NP [facility] SCHED         Signed:  Fabienne Hale MD  5/29/2019  4:42 AM

## 2019-05-30 ENCOUNTER — OFFICE VISIT (OUTPATIENT)
Dept: SURGERY | Age: 54
End: 2019-05-30

## 2019-05-30 VITALS
RESPIRATION RATE: 18 BRPM | HEART RATE: 101 BPM | DIASTOLIC BLOOD PRESSURE: 70 MMHG | SYSTOLIC BLOOD PRESSURE: 101 MMHG | HEIGHT: 65 IN | BODY MASS INDEX: 41.48 KG/M2 | OXYGEN SATURATION: 96 % | WEIGHT: 249 LBS | TEMPERATURE: 98.4 F

## 2019-05-30 DIAGNOSIS — E66.01 MORBID OBESITY WITH BMI OF 40.0-44.9, ADULT (HCC): ICD-10-CM

## 2019-05-30 DIAGNOSIS — Z09 FOLLOW-UP EXAMINATION AFTER ABDOMINAL SURGERY: Primary | ICD-10-CM

## 2019-05-30 DIAGNOSIS — K91.2 POSTOPERATIVE INTESTINAL MALABSORPTION: ICD-10-CM

## 2019-05-30 NOTE — PROGRESS NOTES
1. Have you been to the ER, urgent care clinic since your last visit? Hospitalized since your last visit? No    2. Have you seen or consulted any other health care providers outside of the [address] [place] [address] since your last visit? Include any pap smears or colon screening.  No

## 2019-05-30 NOTE — PATIENT INSTRUCTIONS
Continue on the Bariatric Liquid diet and ok to add low fat strained cream soups, tomato soup, broths and add your protein powder     Ok to add:  Smooth low fat yogurt, sugar free pudding, sugar free jello     For pain:   Heat and abdominal support helpful      No lifting or straining      Use the hydromorphone as you have been

## 2019-05-30 NOTE — PROGRESS NOTES
Chief Complaint   Patient presents with    Surgical Follow-up     1wk s/p revision of gastric bypass, down 27lbs       Roxanne Guzman is 1 week status post revision Malabsorptive gastric bypass for treatment of morbid obesity . PROCEDURES PERFORMED:  1. Laparoscopic small bowel resection. 2.  Laparoscopic partial gastrectomy. 3.  Hiatal hernia repair. 4.  Laparoscopic lysis of adhesions. 5.  Intraoperative upper endoscopy with endoscopic removal of foreign body.     Presents today for obesity management. Patient has lost 27 lbs. Since surgery. Patient is satisfied with progress. Patient is consuming 60+ grams of protein daily. Using glucerna and unjury. She is sick of the sweet shakes and is hoping to do some broth   Patient is drinking 64+ oz of fluids per day. Bowels moving 2-3 times daily. Constipated  no  Using laxatives no  Nausea  no  Regurgitation  no  No fever or chills, chest pain or shortness of breath. Vitamin compliance yes  Activity  Walking some   Sleep   \"good\"  Has pain right side at incision and radiates some to her back. No left shoulder pain   She is back to her baseline pre op pain medication regimen and has not \"used the extra Dr. Rafael Lopez gave me\"  Abdominal support and heat has been helpful   Physical Exam  Visit Vitals  /70 (BP 1 Location: Left arm, BP Patient Position: Sitting)   Pulse (!) 101   Temp 98.4 °F (36.9 °C) (Oral)   Resp 18   Ht 5' 5\" (1.651 m)   Wt 249 lb (112.9 kg)   SpO2 96%   BMI 41.44 kg/m²     A + O x 3, here with mother   Looks well   Chest  CTA, unlabored   COR  RRR  ABD Soft, obese, lap sites C/D/I, no erythema or induration, tender as expected /ND, no masses or hernias   EXT No edema; ambulating with cane       ICD-10-CM ICD-9-CM    1. Follow-up examination after abdominal surgery Z09 V67.09    2. Morbid obesity with BMI of 40.0-44.9, adult ([facility] E66.01 278.01     Z68.41 V85.41    3.  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 Diet, activity and mindfulness educational materials were provided. 14 minutes spent in face to face with Brittny Hector > 50% counseling.

## 2019-06-06 ENCOUNTER — OFFICE VISIT (OUTPATIENT)
Dept: SURGERY | Age: 54
End: 2019-06-06

## 2019-06-06 VITALS
SYSTOLIC BLOOD PRESSURE: 100 MMHG | HEIGHT: 65 IN | WEIGHT: 246 LBS | BODY MASS INDEX: 40.98 KG/M2 | OXYGEN SATURATION: 93 % | TEMPERATURE: 97.6 F | HEART RATE: 76 BPM | RESPIRATION RATE: 18 BRPM | DIASTOLIC BLOOD PRESSURE: 67 MMHG

## 2019-06-06 DIAGNOSIS — Z09 FOLLOW-UP EXAMINATION AFTER ABDOMINAL SURGERY: Primary | ICD-10-CM

## 2019-06-06 DIAGNOSIS — G89.18 POST-OP PAIN: ICD-10-CM

## 2019-06-06 DIAGNOSIS — E66.01 MORBID OBESITY WITH BMI OF 40.0-44.9, ADULT (HCC): ICD-10-CM

## 2019-06-06 DIAGNOSIS — K91.2 POSTOPERATIVE INTESTINAL MALABSORPTION: ICD-10-CM

## 2019-06-06 NOTE — PROGRESS NOTES
Chief Complaint   Patient presents with    Surgical Follow-up     2wks s/p revision of gastric bypass, down 30lbs lost 3lbs       Mohan Angeles is 2 weeks status post revision of Malabsorptive gastric bypass for treatment of morbid obesity . Presents today for obesity management. Patient has lost 30 lbs. Since surgery. Patient is satisfied with progress. Patient is consuming 60+grams of protein daily. Using boost and unjury    Patient is drinking 64+ oz of fluids per day. Bowels moving daily. Constipated  no  Using laxatives no  Nausea  no  Regurgitation  no  No fever or chills, chest pain or shortness of breath. Vitamin compliance yes  Activity  Minimal due to back pain, but walks as much as she can   Sleep   Poor past few days due to back pain   At baseline narcotic use for chronic back pain   Caring for mother with lots of medical issues and Alzheimer   Physical Exam  Visit Vitals  /67 (BP 1 Location: Left arm, BP Patient Position: Sitting)   Pulse 76   Temp 97.6 °F (36.4 °C) (Oral)   Resp 18   Ht 5' 5\" (1.651 m)   Wt 246 lb (111.6 kg)   SpO2 93%   BMI 40.94 kg/m²     A + O x 3  Chest  CTA, unlabored   COR  RRR  ABD Soft, obese, lap sites C/D/I; tender along right side incision; /ND, no masses or hernias   EXT No edema; ambulating with cane slowly        ICD-10-CM ICD-9-CM    1. Follow-up examination after abdominal surgery Z09 V67.09    2. Morbid obesity with BMI of 40.0-44.9, adult ([facility] E66.01 278.01     Z68.41 V85.41    3. Postoperative intestinal malabsorption K91.2 579.3    4.  Post-op pain G89.18 338.18        Mohan Angeles is 2 weeks s/p revision gastric bypass  doing well   Diet soft stage I   Continue vitamins and protein supplements   Activity daily walking 10 minutes every hour or move as much as possible   Sleep hygiene reviewed   Follow-up in 2 weeks  Offered support reference for caring for her mom (senior sugar)   Support group  Mohan Angeles verbalized understanding and questions were answered to the best of my knowledge and ability. Diet, activity and mindfulness educational materials were provided. 22 minutes spent in face to face with Kathy Hector > 50% counseling.

## 2019-06-06 NOTE — PATIENT INSTRUCTIONS
Continue to use your protein powders / shakes to meet your daily [facility] has a local chapter and can be very helpful in caring for an aging parent / dealing with Alzheimer's   [email or web address]      What you need to know:  1. Advance your diet to soft foods. Follow the handout that you were given today in the office. 2.  Take the recommended vitamins daily  3. No lifting greater than 20 lbs. 4.  You can do light jogging and walking. 5  Follow up in 2 weeks. 6.  You may go into a pool. 7.  If you are not able to tolerate liquids or soft foods. Please call our office. [phone]  8. If you have vomiting and persistent epigastric pain or chest pain. You should call our office, the doctor on-call or go to the emergency room. What to do if you are constipated: You may  take Milk of Magnesia. Take 2 Tablespoons followed by 16 oz of water then 2 hours later take another 2 tablespoons. If  milk of magnesia does not work then take [place] or Miralax over the counter. Keep in mind that the Benefiber or Miralax may take a day or two to work. If all of the above do not work try a Fleets enema and follow the directions on the box. Soft and Mushy: Phase 1    Below is a list of basic items to purchase for the first phase of the   soft mushy diet. Your surgeon or nurse practitioner will inform you when it is okay   to advance to the next phase. Soft and Mushy Foods: Prepare food to the appropriate texture. ? Everything on clear and full liquid diet  ? Applesauce (no sugar added)  ? Hot & cold cereals (Cream of Wheat, Plain Cheerios®, Special K with protein®, plain oatmeal, grits)  ? Frozen or canned vegetables (carrots, acorn squash, butternut squash, string beans, spinach, broccoli, cauliflower - florets only!)   ? Canned fruit (in natural juice or with Splenda®)  ? Fat-free, cholesterol-free egg substitute (P)  ?  Low-fat or fat-free cottage cheese (P)  ? Low-fat or fat-free yogurt (P)  ? Low-fat or fat-free [place] yogurt (P)  ? Fat-free milk or 1% milk (P)  ? Lactaid fat-free or 1% low fat milk (P)  ? Low-fat well-cooked/soft beans (the consistency of refried beans) (P)  ? No sugar added, low fat pudding (no pistachio or other flavor containing nuts)  ? low-fat cream soups  ? Low-fat chicken noodle or chicken rice soup (P)  ? Sugar-free fudgesicles  ? Sugar-free cocoa  ? Fat free whipped or mashed potatoes   ? Herbs and spices  ? Lite butter, margarine, canola oil, olive oil, reduced-fat or fat-free mayonnaise, reduced-fat or fat-free salad dressing, reduced-fat or fat-free cream cheese, reduced-fat or fat-free sour cream.        P designates food sources of protein. Include a protein at each meal.     If a food does not contain protein, you may want to consider adding protein powder to the food to give it extra protein. For example, mix protein powder in with the following: oatmeal, mashed potatoes, sugar-free pudding, sugar-free gelatin (see recipes in book), no-sugar-added applesauce. Soft Mushy Diet: Phase 1  Time Meal or Snack Soft/Mushy Food Amount (ounces) Protein  (g) Supplement   6:30 am Sip on Fluids Sip on non-carbonated, calorie-free, no sugar added liquids. 8 oz   0 g Take Multivitamin containing 18 mg ferrous sulfate (iron)    7:00 am   Stop drinking fluids 30 minutes before breakfast   7:30 am Breakfast ½ cup sugar-free oatmeal with 1 scoop of protein powder. Add cinnamon, nutmeg, artificial sweeteners as desired for flavor.   4 oz    20-25 g    9:00 Snack  (optional) High Protein Gelatin (see recipe in book) 4oz 10 g    11:30 am Stop drinking liquids 30 minutes before lunch   12:00 pm Lunch Sip low-fat cream of potato soup or low-fat cream of chicken soup mixed with 1 scoop of protein powder 8 oz soup 25 g Take 400 mg calcium citrate   2:00 Snack  (optional) ½ cup high protein pudding (see recipe in book)   or   ½ cup low-fat cottage cheese or yogurt. Can also add protein powder as needed. 4 oz 14 g    or    5 g      3:00 - 5:30 pm   Sip on Fluids   Sip on non-carbonated, calorie-free, no sugar added liquids. 24 - 32 oz   0 g   Take 400 mg calcium citrate. 6:00 pm Dinner ¼ cup low-fat, well cooked beans (ex. black beans, low-fat refried beans)  ¼ cup no-sugar-added applesauce 4 oz 3.5 g Take 400 mg of calcium citrate.    7:00 - 10:00 pm Sip on Fluids Sip on non-carbonated, no sugar added liquids as needed  16-24 oz 0 g Take Multivitamin with 18 mg ferrous sulfate   Total:  80 oz clear fluids 63-77  grams 2 Multivitamins with 18 mg ferrous sulfate, 5601-6337 mg calcium citrate

## 2019-06-06 NOTE — PROGRESS NOTES
1. Have you been to the ER, urgent care clinic since your last visit? Hospitalized since your last visit? No    2. Have you seen or consulted any other health care providers outside of the [address] [place] [address] since your last visit? Include any pap smears or colon screening.  No

## 2019-06-17 ENCOUNTER — TELEPHONE (OUTPATIENT)
Dept: SURGERY | Age: 54
End: 2019-06-17

## 2019-06-17 NOTE — TELEPHONE ENCOUNTER
Bariatric Post-Operative Phone Calls: Week 3    Diet:Question of any nausea and/or vomiting. Question of tolerance to diet advancement from liquids to solids. Protein intake (goal is 60 grams of protein daily)   Poor____Fair____Good____Great__x__     Comment:______________________________________________________________      ______________________________________________________________________    Hydration:Less than 32 ounces of water daily is fair to poor (Goal is 64 ounces per day)   Poor____ Fair____ Good____Great_x___    Comment:______________________________________________________________    ______________________________________________________________________      Ambulation:( walking at least 3 x week, for at least 30 minutes)   Poor______ Fair______ Good______     Great___x___ Comment:__________________________________________________    ______________________________________________________________________      Urine Color: Question of any odor and color(should be karson, pale, and clear) Dark______ Amber______ Pale__x____      Clear______ Comment:___________________________________________________                           ________________________________________________________________    Bowel movements: Question of any constipation- haven't had any bowel movements for more than 3 days. This could be related to protein intake and/or narcotic pain medication usage. Comment:                                                         Moving her bowels with help of Miralax                                                                     Pain: Left sided abdominal pain is normal (should be less than 3)         Question if pain medication is helpful.  10___ 9___ 8___ 7___ 6___ 5___ 4___ 3___     2___1___0_x__Comment:_________________________________________________    ______________________________________________________________________      Incision: (No redness, pain, swelling or fever) Healing Well___x___     Healed______Redness_________ Pain_________     Swelling_________ Fever__________(greater than 101 needs evaluation)    Comment:____________________________________________________________    ______________________________________________________________________  Use of incentive spirometer: Yes____       No  x         Next Appointment:___6/20/19___________                 Support Group: Yes______No__x____    Additional Comments:__will  support schedule when she comes on Thursday__________________________________________________________    ____________________________________________________________________      If more than one parameter is not met or considered poor, nurse needs to discuss with provider recommend for patient to be seen in the office as soon as possible or refer to the provider for follow-up. Reinforce to patient to use bariatric educational booklet as guide. It is appropriate to refer patient to the nutritionist to discuss more in detail of diet and nutrition.

## 2019-06-20 ENCOUNTER — OFFICE VISIT (OUTPATIENT)
Dept: SURGERY | Age: 54
End: 2019-06-20

## 2019-06-20 VITALS
RESPIRATION RATE: 18 BRPM | DIASTOLIC BLOOD PRESSURE: 75 MMHG | HEART RATE: 70 BPM | HEIGHT: 65 IN | WEIGHT: 243 LBS | TEMPERATURE: 98.3 F | SYSTOLIC BLOOD PRESSURE: 115 MMHG | BODY MASS INDEX: 40.48 KG/M2 | OXYGEN SATURATION: 95 %

## 2019-06-20 DIAGNOSIS — Z09 FOLLOW-UP EXAMINATION AFTER ABDOMINAL SURGERY: Primary | ICD-10-CM

## 2019-06-20 DIAGNOSIS — E66.01 MORBID OBESITY WITH BMI OF 40.0-44.9, ADULT (HCC): ICD-10-CM

## 2019-06-20 DIAGNOSIS — K91.2 POSTOPERATIVE INTESTINAL MALABSORPTION: ICD-10-CM

## 2019-06-20 NOTE — PROGRESS NOTES
1. Have you been to the ER, urgent care clinic since your last visit? Hospitalized since your last visit? No    2. Have you seen or consulted any other health care providers outside of the [address] [place] [address] since your last visit? Include any pap smears or colon screening.  No

## 2019-06-25 NOTE — PROGRESS NOTES
Chief Complaint   Patient presents with    Surgical Follow-up     4wks s/p revision of gastric bypass, down 33lbs lost 3lbs       Teto Francois is 4 weeks status post revision Malabsorptive gastric bypass for treatment of morbid obesity . Presents today for obesity management. Patient has lost 33 lbs. Since surgery. Patient is satisfied with progress. Patient is consuming 55-65 grams of protein daily. Continues to supplement  Patient is drinking 70+ oz of fluids per day. Bowels moving  daily. Constipated  no  Using laxatives no  Nausea  no  Regurgitation  no  No fever or chills, chest pain or shortness of breath. Vitamin compliance yes  Activity  Minimal as back and joint pain \"bad right now\"   Sleep   Ok     Physical Exam  Visit Vitals  /75 (BP 1 Location: Left arm, BP Patient Position: Sitting)   Pulse 70   Temp 98.3 °F (36.8 °C) (Oral)   Resp 18   Ht 5' 5\" (1.651 m)   Wt 243 lb (110.2 kg)   SpO2 95%   BMI 40.44 kg/m²     A + O x 3  Chest  CTA, unlabored   COR  RRR  ABD Soft, obese, lap sites C/D/I well healed; NT/ND, no masses or hernias   EXT No edema; ambulating with cane     weeks     ICD-10-CM ICD-9-CM    1. Follow-up examination after abdominal surgery Z09 V67.09    2. 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 BMI 40.0-44.9, adult ([facility] Z68.41 V85.41    4. Morbid obesity with BMI of 40.0-44.9, adult ([facility]) E66.01 278.01     Z68.41 V85.41        Teto Francois is 4 weeks s/p revision  doing well   Diet stage II soft  Continue vitamins and protein supplements   Activity daily walking 10 minutes every hour / pool  Sleep hygiene reviewed   Follow-up in 2 weeks   Support group  Teto Francois verbalized understanding and questions were answered to the best of my knowledge and ability. Diet, activity and mindfulness educational materials were provided. 13 minutes spent in face to face with Durwin Needs [place] > 50% counseling.

## 2019-06-28 ENCOUNTER — HOSPITAL ENCOUNTER (OUTPATIENT)
Dept: GENERAL RADIOLOGY | Age: 54
Discharge: HOME | End: 2019-06-28
Payer: COMMERCIAL

## 2019-06-28 DIAGNOSIS — M54.2 CERVICALGIA: ICD-10-CM

## 2019-06-28 PROCEDURE — 72050 X-RAY EXAM NECK SPINE 4/5VWS: CPT

## 2019-07-03 ENCOUNTER — OFFICE VISIT (OUTPATIENT)
Dept: SURGERY | Age: 54
End: 2019-07-03

## 2019-07-03 VITALS
WEIGHT: 246 LBS | DIASTOLIC BLOOD PRESSURE: 68 MMHG | RESPIRATION RATE: 20 BRPM | OXYGEN SATURATION: 95 % | BODY MASS INDEX: 40.98 KG/M2 | SYSTOLIC BLOOD PRESSURE: 92 MMHG | TEMPERATURE: 97.9 F | HEART RATE: 70 BPM | HEIGHT: 65 IN

## 2019-07-03 DIAGNOSIS — Z09 SURGICAL FOLLOWUP: ICD-10-CM

## 2019-07-03 DIAGNOSIS — E66.01 OBESITY, MORBID (HCC): Primary | ICD-10-CM

## 2019-07-03 NOTE — PROGRESS NOTES
.6 weeks post lap partial gastrectomy, SM resection, hiatal hernia, JOHN, Intraoperative upper endoscopy with endoscopic removal of foreign body . She presnts for obesity managment  Pt reports doing well on liquids, soft and soft meats. .    She complains of back pain. She has has not been eating a lot of soft textured foods due back pain. She has been drinking her shakes. Pt reports nausea with pills. She is taking 6-7 pills together at night which is when she feels nausea. Sheis drinking approximately 64+ oz of water daily  + BM, constipation takes Miralax  She is drinking and eating 50+ grams of protein daily. She is taking bariatric vitamins without issue. Total weight loss since surgery 30lbs  Weight gain 3 llbs  Visit Vitals  BP 92/68 (BP 1 Location: Left arm, BP Patient Position: Sitting)   Pulse 70   Temp 97.9 °F (36.6 °C) (Oral)   Resp 20   Ht 5' 5\" (1.651 m)   Wt 246 lb (111.6 kg)   SpO2 95%   BMI 40.94 kg/m²          Patient has an advanced directive: NOt on file    Ms. Hector has a reminder for a \"due or due soon\" health maintenance. I have asked that she contact her primary care provider for follow-up on this health maintenance. Physical Examination: General appearance - alert, well appearing, and in no distress,  Chest - clear to auscultation bilaterally  Heart - normal rate, regular rhythm, normal S1, S2, no murmurs, rubs, clicks or gallops  Abdomen - soft, nontender, nondistended  scars from previous incisions healing without erythema or induration    A/P    Doing well . 6 weeks post lap partial gastrectomy, SM resection, hiatal hernia, JOHN, Intraoperative upper endoscopy with endoscopic removal of foreign body   Diet advanced to solid foods. Focus on 50-60 grams of protein daily. Encouraged water intake to 64 oz of non-carbonated/no calorie beverages daily. Supplement with unflavored protein powder daily. May exercise when able due to back pain. Continue vitamins.    Follow up in 6 weeks. Pt verbalized understanding and questions were answered to the best of my knowledge and ability.       Malena Lopes NP

## 2019-07-03 NOTE — PROGRESS NOTES
1. Have you been to the ER, urgent care clinic since your last visit? Hospitalized since your last visit? No    2. Have you seen or consulted any other health care providers outside of the [address] [place] [address] since your last visit? Include any pap smears or colon screening.  No

## 2019-07-16 RX ORDER — PANTOPRAZOLE SODIUM 40 MG/1
TABLET, DELAYED RELEASE ORAL
Qty: 90 TAB | Refills: 0 | Status: SHIPPED | OUTPATIENT
Start: 2019-07-16 | End: 2019-10-12 | Stop reason: SDUPTHER

## 2019-08-19 ENCOUNTER — OFFICE VISIT (OUTPATIENT)
Dept: SURGERY | Age: 54
End: 2019-08-19

## 2019-08-19 VITALS
WEIGHT: 231 LBS | RESPIRATION RATE: 18 BRPM | DIASTOLIC BLOOD PRESSURE: 82 MMHG | OXYGEN SATURATION: 98 % | HEIGHT: 65 IN | SYSTOLIC BLOOD PRESSURE: 128 MMHG | HEART RATE: 81 BPM | BODY MASS INDEX: 38.49 KG/M2 | TEMPERATURE: 98.3 F

## 2019-08-19 DIAGNOSIS — Z09 FOLLOW-UP EXAMINATION AFTER ABDOMINAL SURGERY: Primary | ICD-10-CM

## 2019-08-19 DIAGNOSIS — E66.9 OBESITY, CLASS II, BMI 35-39.9: ICD-10-CM

## 2019-08-19 DIAGNOSIS — E61.1 IRON DEFICIENCY: ICD-10-CM

## 2019-08-19 DIAGNOSIS — E55.9 VITAMIN D DEFICIENCY: ICD-10-CM

## 2019-08-19 DIAGNOSIS — K91.2 POSTOPERATIVE INTESTINAL MALABSORPTION: ICD-10-CM

## 2019-08-19 DIAGNOSIS — E53.8 B12 DEFICIENCY: ICD-10-CM

## 2019-08-19 NOTE — PROGRESS NOTES
Chief Complaint   Patient presents with    Surgical Follow-up     13wks s/p lap partial gastrectomy, SM resection, hiatal hernia, JOHN, Intraoperative upper endoscopy with endoscopic removal of foreign body down, 45lbs lost 15lbs       Devorah Clark is 3 months status post revision gastric bypass. Presents today for obesity management. Patient has lost 45 lbs. Since surgery. Patient is satisfied with progress. Patient is consuming about 55-65 grams of protein daily. Patient is drinking 64+ oz of fluids per day. Bowels moving daily. Constipated  no  Using laxatives no  Nausea  no  Regurgitation  no  No fever or chills, chest pain or shortness of breath. Vitamin compliance yes  Activity  Minimal due to hip and back pain   Sleep   Ok   Hoping to get right hip replaced soon, but needs to find caregiver assist for her mother. 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 Physical Exam  Visit Vitals  /82 (BP 1 Location: Left arm, BP Patient Position: Sitting)   Pulse 81   Temp 98.3 °F (36.8 °C) (Oral)   Resp 18   Ht 5' 5\" (1.651 m)   Wt 231 lb (104.8 kg)   SpO2 98%   BMI 38.44 kg/m²     A + O x 3  Chest  CTA, unlabored   COR  RRR  ABD Soft, obese, lap sites C/D/I well healed; NT/ND, no masses or hernias   EXT No edema; ambulating slowly       ICD-10-CM ICD-9-CM    1. Follow-up examination after abdominal surgery Z09 V67.09 CBC W/O DIFF      VITAMIN B12 & FOLATE      VITAMIN D, 25 HYDROXY      METABOLIC PANEL, COMPREHENSIVE      IRON   2. Obesity, Class II, BMI 35-39.9 E66.9 278.00 CBC W/O DIFF      VITAMIN B12 & FOLATE      VITAMIN D, 25 HYDROXY      METABOLIC PANEL, COMPREHENSIVE      IRON   3. Postoperative intestinal malabsorption K91.2 579.3 CBC W/O DIFF      VITAMIN B12 & FOLATE      VITAMIN D, 25 HYDROXY      METABOLIC PANEL, COMPREHENSIVE      IRON   4.  Iron deficiency E61.1 280.9 CBC W/O DIFF      VITAMIN B12 & FOLATE      VITAMIN D, 25 HYDROXY      METABOLIC PANEL, COMPREHENSIVE      IRON   5. B12 deficiency E53.8 266.2 CBC W/O DIFF      VITAMIN B12 & FOLATE      VITAMIN D, 25 HYDROXY      METABOLIC PANEL, COMPREHENSIVE      IRON   6. Vitamin D deficiency E55.9 268.9 CBC W/O DIFF      VITAMIN B12 & FOLATE      VITAMIN D, 25 HYDROXY      METABOLIC PANEL, COMPREHENSIVE      IRON       Jsoe Rutherford is 3 months  s/p revision gastric bypass  doing well   Diet bariatric protein and produce   Continue vitamins and protein supplements   Activity joining the [facility] and will use the pool   Sleep hygiene reviewed   Follow-up in 3 months   Labs ordered and she can do when she sees her endocrinologist next month   Referred to [facility] as resource for caring for aging parent/alzheimers care   Jose Rutherford verbalized understanding and questions were answered to the best of my knowledge and ability. Diet, activity and mindfulness educational materials were provided. 22 minutes spent in face to face with Shanice Hector > 50% counseling.

## 2019-08-19 NOTE — PATIENT INSTRUCTIONS
Check with the [employer] [facility] about support for you and your mom     Look into [address] (usually 2 weeks in a nursing home type facility)     I'll follow up when I get your labs back     Stay on your vitamins

## 2019-08-19 NOTE — PROGRESS NOTES
1. Have you been to the ER, urgent care clinic since your last visit? Hospitalized since your last visit? No    2. Have you seen or consulted any other health care providers outside of the [address] [place] [address] since your last visit? Include any pap smears or colon screening.  No

## 2019-10-14 RX ORDER — PANTOPRAZOLE SODIUM 40 MG/1
TABLET, DELAYED RELEASE ORAL
Qty: 90 TAB | Refills: 0 | Status: SHIPPED | OUTPATIENT
Start: 2019-10-14 | End: 2019-11-20

## 2019-11-20 ENCOUNTER — HOSPITAL ENCOUNTER (OUTPATIENT)
Dept: PREADMISSION TESTING | Age: 54
Discharge: HOME | End: 2019-11-20
Payer: COMMERCIAL

## 2019-11-20 VITALS
SYSTOLIC BLOOD PRESSURE: 129 MMHG | HEIGHT: 63 IN | BODY MASS INDEX: 40.66 KG/M2 | TEMPERATURE: 97.9 F | WEIGHT: 229.5 LBS | HEART RATE: 68 BPM | DIASTOLIC BLOOD PRESSURE: 85 MMHG

## 2019-11-20 DIAGNOSIS — E66.01 MORBID OBESITY (HCC): Primary | ICD-10-CM

## 2019-11-20 LAB
ABO + RH BLD: NORMAL
ANION GAP SERPL CALC-SCNC: 4 MMOL/L (ref 5–15)
APPEARANCE UR: CLEAR
ATRIAL RATE: 62 BPM
BACTERIA URNS QL MICRO: NEGATIVE /HPF
BILIRUB UR QL: NEGATIVE
BLOOD GROUP ANTIBODIES SERPL: NORMAL
BUN SERPL-MCNC: 7 MG/DL (ref 6–20)
BUN/CREAT SERPL: 9 (ref 12–20)
CALCIUM SERPL-MCNC: 8.4 MG/DL (ref 8.5–10.1)
CALCULATED P AXIS, ECG09: 6 DEGREES
CALCULATED R AXIS, ECG10: 19 DEGREES
CALCULATED T AXIS, ECG11: -14 DEGREES
CHLORIDE SERPL-SCNC: 108 MMOL/L (ref 97–108)
CO2 SERPL-SCNC: 29 MMOL/L (ref 21–32)
COLOR UR: NORMAL
CREAT SERPL-MCNC: 0.74 MG/DL (ref 0.55–1.02)
DIAGNOSIS, 93000: NORMAL
EPITH CASTS URNS QL MICRO: NORMAL /LPF
ERYTHROCYTE [DISTWIDTH] IN BLOOD BY AUTOMATED COUNT: 13.8 % (ref 11.5–14.5)
EST. AVERAGE GLUCOSE BLD GHB EST-MCNC: 117 MG/DL
GLUCOSE SERPL-MCNC: 95 MG/DL (ref 65–100)
GLUCOSE UR STRIP.AUTO-MCNC: NEGATIVE MG/DL
HBA1C MFR BLD: 5.7 % (ref 4–5.6)
HCT VFR BLD AUTO: 40 % (ref 35–47)
HGB BLD-MCNC: 12.5 G/DL (ref 11.5–16)
HGB UR QL STRIP: NEGATIVE
INR PPP: 1 (ref 0.9–1.1)
KETONES UR QL STRIP.AUTO: NEGATIVE MG/DL
LEUKOCYTE ESTERASE UR QL STRIP.AUTO: NEGATIVE
MCH RBC QN AUTO: 30.6 PG (ref 26–34)
MCHC RBC AUTO-ENTMCNC: 31.3 G/DL (ref 30–36.5)
MCV RBC AUTO: 98 FL (ref 80–99)
NITRITE UR QL STRIP.AUTO: NEGATIVE
NRBC # BLD: 0 K/UL (ref 0–0.01)
NRBC BLD-RTO: 0 PER 100 WBC
P-R INTERVAL, ECG05: 156 MS
PH UR STRIP: 5.5 [PH] (ref 5–8)
PLATELET # BLD AUTO: 250 K/UL (ref 150–400)
PMV BLD AUTO: 10.9 FL (ref 8.9–12.9)
POTASSIUM SERPL-SCNC: 4.3 MMOL/L (ref 3.5–5.1)
PROT UR STRIP-MCNC: NEGATIVE MG/DL
PROTHROMBIN TIME: 9.8 SEC (ref 9–11.1)
Q-T INTERVAL, ECG07: 454 MS
QRS DURATION, ECG06: 90 MS
QTC CALCULATION (BEZET), ECG08: 460 MS
RBC # BLD AUTO: 4.08 M/UL (ref 3.8–5.2)
RBC #/AREA URNS HPF: NORMAL /HPF (ref 0–5)
SODIUM SERPL-SCNC: 141 MMOL/L (ref 136–145)
SP GR UR REFRACTOMETRY: 1.02 (ref 1–1.03)
SPECIMEN EXP DATE BLD: NORMAL
UA: UC IF INDICATED,UAUC: NORMAL
UROBILINOGEN UR QL STRIP.AUTO: 0.2 EU/DL (ref 0.2–1)
VENTRICULAR RATE, ECG03: 62 BPM
WBC # BLD AUTO: 4 K/UL (ref 3.6–11)
WBC URNS QL MICRO: NORMAL /HPF (ref 0–4)

## 2019-11-20 PROCEDURE — 85027 COMPLETE CBC AUTOMATED: CPT

## 2019-11-20 PROCEDURE — 36415 COLL VENOUS BLD VENIPUNCTURE: CPT

## 2019-11-20 PROCEDURE — 85610 PROTHROMBIN TIME: CPT

## 2019-11-20 PROCEDURE — 83036 HEMOGLOBIN GLYCOSYLATED A1C: CPT

## 2019-11-20 PROCEDURE — 80048 BASIC METABOLIC PNL TOTAL CA: CPT

## 2019-11-20 PROCEDURE — 81001 URINALYSIS AUTO W/SCOPE: CPT

## 2019-11-20 PROCEDURE — 86900 BLOOD TYPING SEROLOGIC ABO: CPT

## 2019-11-20 PROCEDURE — 93005 ELECTROCARDIOGRAM TRACING: CPT

## 2019-11-20 NOTE — PERIOP NOTES
Preoperative instructions reviewed with patient. Patient given two bottles of CHG soap. Instructions reviewed on use of CHG soap. Patient given SSI infection FAQS sheet, as well as a  MRSA/MSSA treatment instruction sheet  With an explanation to patient that they will be notified if treatment instructions need to be initiated. Patient was given the opportunity to ask questions on the information provided.

## 2019-11-21 LAB
BACTERIA SPEC CULT: NORMAL
BACTERIA SPEC CULT: NORMAL
SERVICE CMNT-IMP: NORMAL

## 2019-11-21 RX ORDER — ACETAMINOPHEN 500 MG
1000 TABLET ORAL ONCE
Status: CANCELLED | OUTPATIENT
Start: 2019-11-21 | End: 2019-11-21

## 2019-11-21 RX ORDER — CELECOXIB 200 MG/1
200 CAPSULE ORAL ONCE
Status: CANCELLED | OUTPATIENT
Start: 2019-11-21 | End: 2019-11-21

## 2019-11-21 RX ORDER — CEFAZOLIN SODIUM/WATER 2 G/20 ML
2 SYRINGE (ML) INTRAVENOUS ONCE
Status: CANCELLED | OUTPATIENT
Start: 2019-11-26 | End: 2019-11-26

## 2019-11-25 ENCOUNTER — ANESTHESIA EVENT (OUTPATIENT)
Dept: SURGERY | Age: 54
DRG: 470 | End: 2019-11-25
Payer: COMMERCIAL

## 2019-11-26 ENCOUNTER — HOSPITAL ENCOUNTER (INPATIENT)
Age: 54
LOS: 1 days | Discharge: HOME HEALTH | DRG: 470 | End: 2019-11-27
Attending: ORTHOPAEDIC SURGERY | Admitting: ORTHOPAEDIC SURGERY
Payer: COMMERCIAL

## 2019-11-26 ENCOUNTER — ANESTHESIA (OUTPATIENT)
Dept: SURGERY | Age: 54
DRG: 470 | End: 2019-11-26
Payer: COMMERCIAL

## 2019-11-26 ENCOUNTER — APPOINTMENT (OUTPATIENT)
Dept: GENERAL RADIOLOGY | Age: 54
DRG: 470 | End: 2019-11-26
Attending: PHYSICIAN ASSISTANT
Payer: COMMERCIAL

## 2019-11-26 DIAGNOSIS — M16.11 PRIMARY OSTEOARTHRITIS OF RIGHT HIP: Primary | ICD-10-CM

## 2019-11-26 LAB
GLUCOSE BLD STRIP.AUTO-MCNC: 88 MG/DL (ref 65–100)
SERVICE CMNT-IMP: NORMAL

## 2019-11-26 PROCEDURE — 77030020365 HC SOL INJ SOD CL 0.9% 50ML: Performed by: ORTHOPAEDIC SURGERY

## 2019-11-26 PROCEDURE — 94664 DEMO&/EVAL PT USE INHALER: CPT

## 2019-11-26 PROCEDURE — 77030008684 HC TU ET CUF COVD -B: Performed by: ANESTHESIOLOGY

## 2019-11-26 PROCEDURE — 77030011640 HC PAD GRND REM COVD -A: Performed by: ORTHOPAEDIC SURGERY

## 2019-11-26 PROCEDURE — 77030018673: Performed by: ORTHOPAEDIC SURGERY

## 2019-11-26 PROCEDURE — 77030033067 HC SUT PDO STRATFX SPIR J&J -B: Performed by: ORTHOPAEDIC SURGERY

## 2019-11-26 PROCEDURE — 74011250636 HC RX REV CODE- 250/636: Performed by: NURSE ANESTHETIST, CERTIFIED REGISTERED

## 2019-11-26 PROCEDURE — 77030018723 HC ELCTRD BLD COVD -A: Performed by: ORTHOPAEDIC SURGERY

## 2019-11-26 PROCEDURE — 74011250636 HC RX REV CODE- 250/636: Performed by: PHYSICIAN ASSISTANT

## 2019-11-26 PROCEDURE — 74011250637 HC RX REV CODE- 250/637: Performed by: ORTHOPAEDIC SURGERY

## 2019-11-26 PROCEDURE — 77030002933 HC SUT MCRYL J&J -A: Performed by: ORTHOPAEDIC SURGERY

## 2019-11-26 PROCEDURE — 73501 X-RAY EXAM HIP UNI 1 VIEW: CPT

## 2019-11-26 PROCEDURE — 74011250636 HC RX REV CODE- 250/636: Performed by: ANESTHESIOLOGY

## 2019-11-26 PROCEDURE — 74011000250 HC RX REV CODE- 250: Performed by: NURSE ANESTHETIST, CERTIFIED REGISTERED

## 2019-11-26 PROCEDURE — 77030012935 HC DRSG AQUACEL BMS -B: Performed by: ORTHOPAEDIC SURGERY

## 2019-11-26 PROCEDURE — P9045 ALBUMIN (HUMAN), 5%, 250 ML: HCPCS | Performed by: NURSE ANESTHETIST, CERTIFIED REGISTERED

## 2019-11-26 PROCEDURE — 74011000258 HC RX REV CODE- 258: Performed by: NURSE ANESTHETIST, CERTIFIED REGISTERED

## 2019-11-26 PROCEDURE — 82962 GLUCOSE BLOOD TEST: CPT

## 2019-11-26 PROCEDURE — 77030040922 HC BLNKT HYPOTHRM STRY -A

## 2019-11-26 PROCEDURE — 76060000037 HC ANESTHESIA 3 TO 3.5 HR: Performed by: ORTHOPAEDIC SURGERY

## 2019-11-26 PROCEDURE — 77030031139 HC SUT VCRL2 J&J -A: Performed by: ORTHOPAEDIC SURGERY

## 2019-11-26 PROCEDURE — 77030018074 HC RTVR SUT ARTH4 S&N -B: Performed by: ORTHOPAEDIC SURGERY

## 2019-11-26 PROCEDURE — 77030018846 HC SOL IRR STRL H20 ICUM -A: Performed by: ORTHOPAEDIC SURGERY

## 2019-11-26 PROCEDURE — 74011250637 HC RX REV CODE- 250/637: Performed by: PHYSICIAN ASSISTANT

## 2019-11-26 PROCEDURE — 76210000016 HC OR PH I REC 1 TO 1.5 HR: Performed by: ORTHOPAEDIC SURGERY

## 2019-11-26 PROCEDURE — 77030039266 HC ADH SKN EXOFIN S2SG -A: Performed by: ORTHOPAEDIC SURGERY

## 2019-11-26 PROCEDURE — 77030018547 HC SUT ETHBND1 J&J -B: Performed by: ORTHOPAEDIC SURGERY

## 2019-11-26 PROCEDURE — 74011250636 HC RX REV CODE- 250/636: Performed by: ORTHOPAEDIC SURGERY

## 2019-11-26 PROCEDURE — 97116 GAIT TRAINING THERAPY: CPT

## 2019-11-26 PROCEDURE — 77030018836 HC SOL IRR NACL ICUM -A: Performed by: ORTHOPAEDIC SURGERY

## 2019-11-26 PROCEDURE — 65270000029 HC RM PRIVATE

## 2019-11-26 PROCEDURE — 77030026438 HC STYL ET INTUB CARD -A: Performed by: ANESTHESIOLOGY

## 2019-11-26 PROCEDURE — 74011000250 HC RX REV CODE- 250: Performed by: PHYSICIAN ASSISTANT

## 2019-11-26 PROCEDURE — 94640 AIRWAY INHALATION TREATMENT: CPT

## 2019-11-26 PROCEDURE — 0SR904A REPLACEMENT OF RIGHT HIP JOINT WITH CERAMIC ON POLYETHYLENE SYNTHETIC SUBSTITUTE, UNCEMENTED, OPEN APPROACH: ICD-10-PCS | Performed by: ORTHOPAEDIC SURGERY

## 2019-11-26 PROCEDURE — 76010000173 HC OR TIME 3 TO 3.5 HR INTENSV-TIER 1: Performed by: ORTHOPAEDIC SURGERY

## 2019-11-26 PROCEDURE — 77030003882 HC BIT DRL TWST BRSM -B: Performed by: ORTHOPAEDIC SURGERY

## 2019-11-26 PROCEDURE — C1776 JOINT DEVICE (IMPLANTABLE): HCPCS | Performed by: ORTHOPAEDIC SURGERY

## 2019-11-26 PROCEDURE — 74011000250 HC RX REV CODE- 250: Performed by: ORTHOPAEDIC SURGERY

## 2019-11-26 PROCEDURE — 74011250636 HC RX REV CODE- 250/636

## 2019-11-26 PROCEDURE — 97530 THERAPEUTIC ACTIVITIES: CPT

## 2019-11-26 PROCEDURE — 97161 PT EVAL LOW COMPLEX 20 MIN: CPT

## 2019-11-26 PROCEDURE — 77030006822 HC BLD SAW SAG BRSM -B: Performed by: ORTHOPAEDIC SURGERY

## 2019-11-26 DEVICE — SCREW, CANCELLOUS, 25MM
Type: IMPLANTABLE DEVICE | Site: HIP | Status: FUNCTIONAL
Brand: DJO SURGICAL

## 2019-11-26 DEVICE — COMPONENT HIP PRSS FT H1 CERM ON CERM POLYETH: Type: IMPLANTABLE DEVICE | Site: HIP | Status: FUNCTIONAL

## 2019-11-26 DEVICE — FMP HEMISPHERICAL SHELLS W/SCREW HOLES, 52MM, W/P2 COATING
Type: IMPLANTABLE DEVICE | Site: HIP | Status: FUNCTIONAL
Brand: DJO SURGICAL

## 2019-11-26 DEVICE — HEAD, FEMORAL, CERAMIC, BILOX DELTA, 36MM +4.0
Type: IMPLANTABLE DEVICE | Site: HIP | Status: FUNCTIONAL
Brand: DJO SURGICAL

## 2019-11-26 DEVICE — LINER/NON-HOODED-NEU, MP7, HXE-PLUS, 36MM
Type: IMPLANTABLE DEVICE | Site: HIP | Status: FUNCTIONAL
Brand: DJO SURGICAL

## 2019-11-26 DEVICE — TAPERFILL HIP STEM, STANDARD, SIZE 11
Type: IMPLANTABLE DEVICE | Site: HIP | Status: FUNCTIONAL
Brand: DJO SURGICAL

## 2019-11-26 RX ORDER — SODIUM CHLORIDE 9 MG/ML
125 INJECTION, SOLUTION INTRAVENOUS CONTINUOUS
Status: DISPENSED | OUTPATIENT
Start: 2019-11-26 | End: 2019-11-27

## 2019-11-26 RX ORDER — FENTANYL CITRATE 50 UG/ML
50 INJECTION, SOLUTION INTRAMUSCULAR; INTRAVENOUS AS NEEDED
Status: DISCONTINUED | OUTPATIENT
Start: 2019-11-26 | End: 2019-11-26 | Stop reason: HOSPADM

## 2019-11-26 RX ORDER — ARFORMOTEROL TARTRATE 15 UG/2ML
15 SOLUTION RESPIRATORY (INHALATION)
Status: DISCONTINUED | OUTPATIENT
Start: 2019-11-26 | End: 2019-11-27 | Stop reason: HOSPADM

## 2019-11-26 RX ORDER — HYDROMORPHONE HYDROCHLORIDE 2 MG/ML
INJECTION, SOLUTION INTRAMUSCULAR; INTRAVENOUS; SUBCUTANEOUS AS NEEDED
Status: DISCONTINUED | OUTPATIENT
Start: 2019-11-26 | End: 2019-11-26 | Stop reason: HOSPADM

## 2019-11-26 RX ORDER — MIDAZOLAM HYDROCHLORIDE 1 MG/ML
INJECTION, SOLUTION INTRAMUSCULAR; INTRAVENOUS
Status: COMPLETED
Start: 2019-11-26 | End: 2019-11-26

## 2019-11-26 RX ORDER — OXYCODONE HYDROCHLORIDE 5 MG/1
5-10 TABLET ORAL
Qty: 60 TAB | Refills: 0 | Status: SHIPPED | OUTPATIENT
Start: 2019-11-26 | End: 2019-12-03

## 2019-11-26 RX ORDER — DEXMEDETOMIDINE HYDROCHLORIDE 100 UG/ML
INJECTION, SOLUTION INTRAVENOUS AS NEEDED
Status: DISCONTINUED | OUTPATIENT
Start: 2019-11-26 | End: 2019-11-26 | Stop reason: HOSPADM

## 2019-11-26 RX ORDER — MIDAZOLAM HYDROCHLORIDE 1 MG/ML
1 INJECTION, SOLUTION INTRAMUSCULAR; INTRAVENOUS AS NEEDED
Status: DISCONTINUED | OUTPATIENT
Start: 2019-11-26 | End: 2019-11-26 | Stop reason: HOSPADM

## 2019-11-26 RX ORDER — MIDAZOLAM HYDROCHLORIDE 1 MG/ML
0.5 INJECTION, SOLUTION INTRAMUSCULAR; INTRAVENOUS
Status: DISCONTINUED | OUTPATIENT
Start: 2019-11-26 | End: 2019-11-26 | Stop reason: HOSPADM

## 2019-11-26 RX ORDER — OXYCODONE HYDROCHLORIDE 5 MG/1
5 TABLET ORAL
Status: DISCONTINUED | OUTPATIENT
Start: 2019-11-26 | End: 2019-11-27 | Stop reason: HOSPADM

## 2019-11-26 RX ORDER — ESMOLOL HYDROCHLORIDE 10 MG/ML
INJECTION INTRAVENOUS AS NEEDED
Status: DISCONTINUED | OUTPATIENT
Start: 2019-11-26 | End: 2019-11-26 | Stop reason: HOSPADM

## 2019-11-26 RX ORDER — CEFAZOLIN SODIUM/WATER 2 G/20 ML
2 SYRINGE (ML) INTRAVENOUS ONCE
Status: COMPLETED | OUTPATIENT
Start: 2019-11-26 | End: 2019-11-26

## 2019-11-26 RX ORDER — ONDANSETRON 2 MG/ML
4 INJECTION INTRAMUSCULAR; INTRAVENOUS
Status: ACTIVE | OUTPATIENT
Start: 2019-11-26 | End: 2019-11-27

## 2019-11-26 RX ORDER — SODIUM CHLORIDE 0.9 % (FLUSH) 0.9 %
5-40 SYRINGE (ML) INJECTION AS NEEDED
Status: DISCONTINUED | OUTPATIENT
Start: 2019-11-26 | End: 2019-11-26 | Stop reason: HOSPADM

## 2019-11-26 RX ORDER — ALBUTEROL SULFATE 0.83 MG/ML
2.5 SOLUTION RESPIRATORY (INHALATION)
Status: DISCONTINUED | OUTPATIENT
Start: 2019-11-26 | End: 2019-11-27 | Stop reason: HOSPADM

## 2019-11-26 RX ORDER — CEFAZOLIN SODIUM/WATER 2 G/20 ML
2 SYRINGE (ML) INTRAVENOUS EVERY 8 HOURS
Status: COMPLETED | OUTPATIENT
Start: 2019-11-26 | End: 2019-11-27

## 2019-11-26 RX ORDER — CELECOXIB 200 MG/1
200 CAPSULE ORAL ONCE
Status: COMPLETED | OUTPATIENT
Start: 2019-11-26 | End: 2019-11-26

## 2019-11-26 RX ORDER — PANTOPRAZOLE SODIUM 40 MG/1
40 TABLET, DELAYED RELEASE ORAL
Status: DISCONTINUED | OUTPATIENT
Start: 2019-11-26 | End: 2019-11-27 | Stop reason: HOSPADM

## 2019-11-26 RX ORDER — ALBUMIN HUMAN 50 G/1000ML
SOLUTION INTRAVENOUS AS NEEDED
Status: DISCONTINUED | OUTPATIENT
Start: 2019-11-26 | End: 2019-11-26 | Stop reason: HOSPADM

## 2019-11-26 RX ORDER — HYDROMORPHONE HYDROCHLORIDE 1 MG/ML
0.2 INJECTION, SOLUTION INTRAMUSCULAR; INTRAVENOUS; SUBCUTANEOUS
Status: DISCONTINUED | OUTPATIENT
Start: 2019-11-26 | End: 2019-11-26 | Stop reason: HOSPADM

## 2019-11-26 RX ORDER — BUDESONIDE 0.5 MG/2ML
500 INHALANT ORAL
Status: DISCONTINUED | OUTPATIENT
Start: 2019-11-26 | End: 2019-11-27 | Stop reason: HOSPADM

## 2019-11-26 RX ORDER — ACETAMINOPHEN 500 MG
1000 TABLET ORAL ONCE
Status: COMPLETED | OUTPATIENT
Start: 2019-11-26 | End: 2019-11-26

## 2019-11-26 RX ORDER — AMOXICILLIN 250 MG
1 CAPSULE ORAL
Qty: 60 TAB | Refills: 0 | Status: SHIPPED | OUTPATIENT
Start: 2019-11-26

## 2019-11-26 RX ORDER — SODIUM CHLORIDE, SODIUM LACTATE, POTASSIUM CHLORIDE, CALCIUM CHLORIDE 600; 310; 30; 20 MG/100ML; MG/100ML; MG/100ML; MG/100ML
50 INJECTION, SOLUTION INTRAVENOUS CONTINUOUS
Status: DISCONTINUED | OUTPATIENT
Start: 2019-11-26 | End: 2019-11-26 | Stop reason: HOSPADM

## 2019-11-26 RX ORDER — HYDROMORPHONE HYDROCHLORIDE 1 MG/ML
0.5 INJECTION, SOLUTION INTRAMUSCULAR; INTRAVENOUS; SUBCUTANEOUS
Status: DISPENSED | OUTPATIENT
Start: 2019-11-26 | End: 2019-11-27

## 2019-11-26 RX ORDER — PHENYLEPHRINE HCL IN 0.9% NACL 0.4MG/10ML
SYRINGE (ML) INTRAVENOUS AS NEEDED
Status: DISCONTINUED | OUTPATIENT
Start: 2019-11-26 | End: 2019-11-26 | Stop reason: HOSPADM

## 2019-11-26 RX ORDER — DULOXETIN HYDROCHLORIDE 60 MG/1
60 CAPSULE, DELAYED RELEASE ORAL EVERY 12 HOURS
Status: DISCONTINUED | OUTPATIENT
Start: 2019-11-26 | End: 2019-11-27 | Stop reason: HOSPADM

## 2019-11-26 RX ORDER — AMOXICILLIN 250 MG
1 CAPSULE ORAL 2 TIMES DAILY
Status: DISCONTINUED | OUTPATIENT
Start: 2019-11-26 | End: 2019-11-27 | Stop reason: HOSPADM

## 2019-11-26 RX ORDER — TRAZODONE HYDROCHLORIDE 50 MG/1
50 TABLET ORAL
Status: DISCONTINUED | OUTPATIENT
Start: 2019-11-26 | End: 2019-11-27 | Stop reason: HOSPADM

## 2019-11-26 RX ORDER — ONDANSETRON 2 MG/ML
4 INJECTION INTRAMUSCULAR; INTRAVENOUS AS NEEDED
Status: DISCONTINUED | OUTPATIENT
Start: 2019-11-26 | End: 2019-11-26 | Stop reason: HOSPADM

## 2019-11-26 RX ORDER — OXYCODONE HYDROCHLORIDE 5 MG/1
10 TABLET ORAL
Status: DISCONTINUED | OUTPATIENT
Start: 2019-11-26 | End: 2019-11-27 | Stop reason: HOSPADM

## 2019-11-26 RX ORDER — PROPOFOL 10 MG/ML
INJECTION, EMULSION INTRAVENOUS AS NEEDED
Status: DISCONTINUED | OUTPATIENT
Start: 2019-11-26 | End: 2019-11-26 | Stop reason: HOSPADM

## 2019-11-26 RX ORDER — MONTELUKAST SODIUM 10 MG/1
10 TABLET ORAL
Status: DISCONTINUED | OUTPATIENT
Start: 2019-11-26 | End: 2019-11-27 | Stop reason: HOSPADM

## 2019-11-26 RX ORDER — SODIUM CHLORIDE 0.9 % (FLUSH) 0.9 %
5-40 SYRINGE (ML) INJECTION EVERY 8 HOURS
Status: DISCONTINUED | OUTPATIENT
Start: 2019-11-26 | End: 2019-11-27 | Stop reason: HOSPADM

## 2019-11-26 RX ORDER — ROCURONIUM BROMIDE 10 MG/ML
INJECTION, SOLUTION INTRAVENOUS AS NEEDED
Status: DISCONTINUED | OUTPATIENT
Start: 2019-11-26 | End: 2019-11-26 | Stop reason: HOSPADM

## 2019-11-26 RX ORDER — SODIUM CHLORIDE, SODIUM LACTATE, POTASSIUM CHLORIDE, CALCIUM CHLORIDE 600; 310; 30; 20 MG/100ML; MG/100ML; MG/100ML; MG/100ML
INJECTION, SOLUTION INTRAVENOUS
Status: DISCONTINUED | OUTPATIENT
Start: 2019-11-26 | End: 2019-11-26 | Stop reason: HOSPADM

## 2019-11-26 RX ORDER — SODIUM CHLORIDE 0.9 % (FLUSH) 0.9 %
5-40 SYRINGE (ML) INJECTION AS NEEDED
Status: DISCONTINUED | OUTPATIENT
Start: 2019-11-26 | End: 2019-11-27 | Stop reason: HOSPADM

## 2019-11-26 RX ORDER — SODIUM CHLORIDE 0.9 % (FLUSH) 0.9 %
5-40 SYRINGE (ML) INJECTION EVERY 8 HOURS
Status: DISCONTINUED | OUTPATIENT
Start: 2019-11-26 | End: 2019-11-26 | Stop reason: HOSPADM

## 2019-11-26 RX ORDER — ACETAMINOPHEN 500 MG
500 TABLET ORAL EVERY 4 HOURS
Status: DISCONTINUED | OUTPATIENT
Start: 2019-11-26 | End: 2019-11-27 | Stop reason: HOSPADM

## 2019-11-26 RX ORDER — KETAMINE HYDROCHLORIDE 10 MG/ML
INJECTION, SOLUTION INTRAMUSCULAR; INTRAVENOUS AS NEEDED
Status: DISCONTINUED | OUTPATIENT
Start: 2019-11-26 | End: 2019-11-26 | Stop reason: HOSPADM

## 2019-11-26 RX ORDER — DEXAMETHASONE SODIUM PHOSPHATE 4 MG/ML
INJECTION, SOLUTION INTRA-ARTICULAR; INTRALESIONAL; INTRAMUSCULAR; INTRAVENOUS; SOFT TISSUE AS NEEDED
Status: DISCONTINUED | OUTPATIENT
Start: 2019-11-26 | End: 2019-11-26 | Stop reason: HOSPADM

## 2019-11-26 RX ORDER — FENTANYL CITRATE 50 UG/ML
25 INJECTION, SOLUTION INTRAMUSCULAR; INTRAVENOUS
Status: COMPLETED | OUTPATIENT
Start: 2019-11-26 | End: 2019-11-26

## 2019-11-26 RX ORDER — MIDAZOLAM HYDROCHLORIDE 1 MG/ML
INJECTION, SOLUTION INTRAMUSCULAR; INTRAVENOUS AS NEEDED
Status: DISCONTINUED | OUTPATIENT
Start: 2019-11-26 | End: 2019-11-26 | Stop reason: HOSPADM

## 2019-11-26 RX ORDER — ACETAMINOPHEN 500 MG/1
500 CAPSULE, LIQUID FILLED ORAL
Qty: 100 CAP | Refills: 0 | Status: SHIPPED
Start: 2019-11-26

## 2019-11-26 RX ORDER — LABETALOL HYDROCHLORIDE 5 MG/ML
INJECTION, SOLUTION INTRAVENOUS AS NEEDED
Status: DISCONTINUED | OUTPATIENT
Start: 2019-11-26 | End: 2019-11-26 | Stop reason: HOSPADM

## 2019-11-26 RX ORDER — ALBUTEROL SULFATE 90 UG/1
2 AEROSOL, METERED RESPIRATORY (INHALATION)
Status: DISCONTINUED | OUTPATIENT
Start: 2019-11-26 | End: 2019-11-26 | Stop reason: CLARIF

## 2019-11-26 RX ORDER — FENTANYL CITRATE 50 UG/ML
INJECTION, SOLUTION INTRAMUSCULAR; INTRAVENOUS AS NEEDED
Status: DISCONTINUED | OUTPATIENT
Start: 2019-11-26 | End: 2019-11-26 | Stop reason: HOSPADM

## 2019-11-26 RX ORDER — ACETAMINOPHEN 325 MG/1
650 TABLET ORAL ONCE
Status: DISCONTINUED | OUTPATIENT
Start: 2019-11-26 | End: 2019-11-26 | Stop reason: SDUPTHER

## 2019-11-26 RX ORDER — ONDANSETRON 2 MG/ML
INJECTION INTRAMUSCULAR; INTRAVENOUS AS NEEDED
Status: DISCONTINUED | OUTPATIENT
Start: 2019-11-26 | End: 2019-11-26 | Stop reason: HOSPADM

## 2019-11-26 RX ORDER — KETOROLAC TROMETHAMINE 30 MG/ML
15 INJECTION, SOLUTION INTRAMUSCULAR; INTRAVENOUS EVERY 6 HOURS
Status: DISCONTINUED | OUTPATIENT
Start: 2019-11-26 | End: 2019-11-27 | Stop reason: HOSPADM

## 2019-11-26 RX ORDER — GABAPENTIN 300 MG/1
900 CAPSULE ORAL
Status: DISCONTINUED | OUTPATIENT
Start: 2019-11-26 | End: 2019-11-27 | Stop reason: HOSPADM

## 2019-11-26 RX ORDER — HYDROXYZINE HYDROCHLORIDE 10 MG/1
10 TABLET, FILM COATED ORAL
Status: DISCONTINUED | OUTPATIENT
Start: 2019-11-26 | End: 2019-11-27 | Stop reason: HOSPADM

## 2019-11-26 RX ORDER — NALOXONE HYDROCHLORIDE 0.4 MG/ML
0.4 INJECTION, SOLUTION INTRAMUSCULAR; INTRAVENOUS; SUBCUTANEOUS AS NEEDED
Status: DISCONTINUED | OUTPATIENT
Start: 2019-11-26 | End: 2019-11-27 | Stop reason: HOSPADM

## 2019-11-26 RX ORDER — POLYETHYLENE GLYCOL 3350 17 G/17G
17 POWDER, FOR SOLUTION ORAL DAILY
Status: DISCONTINUED | OUTPATIENT
Start: 2019-11-27 | End: 2019-11-27 | Stop reason: HOSPADM

## 2019-11-26 RX ORDER — TOPIRAMATE 25 MG/1
50 TABLET ORAL
Status: DISCONTINUED | OUTPATIENT
Start: 2019-11-26 | End: 2019-11-27 | Stop reason: HOSPADM

## 2019-11-26 RX ORDER — LIDOCAINE HYDROCHLORIDE 10 MG/ML
0.1 INJECTION, SOLUTION EPIDURAL; INFILTRATION; INTRACAUDAL; PERINEURAL AS NEEDED
Status: DISCONTINUED | OUTPATIENT
Start: 2019-11-26 | End: 2019-11-26 | Stop reason: HOSPADM

## 2019-11-26 RX ORDER — CLONIDINE HYDROCHLORIDE 0.1 MG/1
0.1 TABLET ORAL 3 TIMES DAILY
Status: DISCONTINUED | OUTPATIENT
Start: 2019-11-26 | End: 2019-11-27 | Stop reason: HOSPADM

## 2019-11-26 RX ORDER — LIDOCAINE HYDROCHLORIDE 20 MG/ML
INJECTION, SOLUTION EPIDURAL; INFILTRATION; INTRACAUDAL; PERINEURAL AS NEEDED
Status: DISCONTINUED | OUTPATIENT
Start: 2019-11-26 | End: 2019-11-26 | Stop reason: HOSPADM

## 2019-11-26 RX ORDER — PREGABALIN 75 MG/1
75 CAPSULE ORAL ONCE
Status: COMPLETED | OUTPATIENT
Start: 2019-11-26 | End: 2019-11-26

## 2019-11-26 RX ORDER — FACIAL-BODY WIPES
10 EACH TOPICAL DAILY PRN
Status: DISCONTINUED | OUTPATIENT
Start: 2019-11-28 | End: 2019-11-27 | Stop reason: HOSPADM

## 2019-11-26 RX ADMIN — MONTELUKAST SODIUM 10 MG: 10 TABLET, FILM COATED ORAL at 21:52

## 2019-11-26 RX ADMIN — FENTANYL CITRATE 50 MCG: 50 INJECTION, SOLUTION INTRAMUSCULAR; INTRAVENOUS at 09:00

## 2019-11-26 RX ADMIN — DEXMEDETOMIDINE HYDROCHLORIDE 4 MCG: 100 INJECTION, SOLUTION, CONCENTRATE INTRAVENOUS at 09:47

## 2019-11-26 RX ADMIN — HYDROMORPHONE HYDROCHLORIDE 0.5 MG: 2 INJECTION, SOLUTION INTRAMUSCULAR; INTRAVENOUS; SUBCUTANEOUS at 09:51

## 2019-11-26 RX ADMIN — LABETALOL HYDROCHLORIDE 10 MG: 5 INJECTION INTRAVENOUS at 10:16

## 2019-11-26 RX ADMIN — ACETAMINOPHEN 500 MG: 500 TABLET ORAL at 14:25

## 2019-11-26 RX ADMIN — LIDOCAINE HYDROCHLORIDE 100 MG: 20 INJECTION, SOLUTION EPIDURAL; INFILTRATION; INTRACAUDAL; PERINEURAL at 09:00

## 2019-11-26 RX ADMIN — PANTOPRAZOLE SODIUM 40 MG: 40 TABLET, DELAYED RELEASE ORAL at 21:51

## 2019-11-26 RX ADMIN — ESMOLOL HYDROCHLORIDE 30 MG: 10 INJECTION, SOLUTION INTRAVENOUS at 09:52

## 2019-11-26 RX ADMIN — KETOROLAC TROMETHAMINE 15 MG: 30 INJECTION, SOLUTION INTRAMUSCULAR at 21:45

## 2019-11-26 RX ADMIN — SUGAMMADEX 200 MG: 100 INJECTION, SOLUTION INTRAVENOUS at 11:17

## 2019-11-26 RX ADMIN — ACETAMINOPHEN 500 MG: 500 TABLET ORAL at 21:52

## 2019-11-26 RX ADMIN — HYDROMORPHONE HYDROCHLORIDE 0.5 MG: 2 INJECTION, SOLUTION INTRAMUSCULAR; INTRAVENOUS; SUBCUTANEOUS at 09:26

## 2019-11-26 RX ADMIN — Medication 2 G: at 18:40

## 2019-11-26 RX ADMIN — KETAMINE HYDROCHLORIDE 20 MG: 10 INJECTION, SOLUTION INTRAMUSCULAR; INTRAVENOUS at 09:47

## 2019-11-26 RX ADMIN — HYDROMORPHONE HYDROCHLORIDE 0.5 MG: 1 INJECTION, SOLUTION INTRAMUSCULAR; INTRAVENOUS; SUBCUTANEOUS at 14:08

## 2019-11-26 RX ADMIN — Medication 10 ML: at 21:45

## 2019-11-26 RX ADMIN — HYDROMORPHONE HYDROCHLORIDE 0.5 MG: 2 INJECTION, SOLUTION INTRAMUSCULAR; INTRAVENOUS; SUBCUTANEOUS at 09:48

## 2019-11-26 RX ADMIN — CLONIDINE HYDROCHLORIDE 0.1 MG: 0.1 TABLET ORAL at 18:39

## 2019-11-26 RX ADMIN — STANDARDIZED SENNA CONCENTRATE AND DOCUSATE SODIUM 1 TABLET: 8.6; 5 TABLET ORAL at 18:39

## 2019-11-26 RX ADMIN — KETAMINE HYDROCHLORIDE 10 MG: 10 INJECTION, SOLUTION INTRAMUSCULAR; INTRAVENOUS at 10:05

## 2019-11-26 RX ADMIN — DULOXETINE HYDROCHLORIDE 60 MG: 60 CAPSULE, DELAYED RELEASE ORAL at 21:51

## 2019-11-26 RX ADMIN — DEXMEDETOMIDINE HYDROCHLORIDE 8 MCG: 100 INJECTION, SOLUTION, CONCENTRATE INTRAVENOUS at 09:51

## 2019-11-26 RX ADMIN — TRANEXAMIC ACID 1 G: 100 INJECTION, SOLUTION INTRAVENOUS at 09:10

## 2019-11-26 RX ADMIN — BUDESONIDE 500 MCG: 0.5 INHALANT RESPIRATORY (INHALATION) at 20:51

## 2019-11-26 RX ADMIN — TOPIRAMATE 50 MG: 25 TABLET, FILM COATED ORAL at 21:52

## 2019-11-26 RX ADMIN — ONDANSETRON HYDROCHLORIDE 4 MG: 2 INJECTION, SOLUTION INTRAMUSCULAR; INTRAVENOUS at 09:23

## 2019-11-26 RX ADMIN — ROCURONIUM BROMIDE 50 MG: 10 SOLUTION INTRAVENOUS at 09:00

## 2019-11-26 RX ADMIN — ALBUMIN (HUMAN) 250 ML: 12.5 INJECTION, SOLUTION INTRAVENOUS at 10:27

## 2019-11-26 RX ADMIN — FENTANYL CITRATE 25 MCG: 50 INJECTION, SOLUTION INTRAMUSCULAR; INTRAVENOUS at 12:12

## 2019-11-26 RX ADMIN — ONDANSETRON HYDROCHLORIDE 4 MG: 2 INJECTION, SOLUTION INTRAMUSCULAR; INTRAVENOUS at 10:47

## 2019-11-26 RX ADMIN — SODIUM CHLORIDE, POTASSIUM CHLORIDE, SODIUM LACTATE AND CALCIUM CHLORIDE: 600; 310; 30; 20 INJECTION, SOLUTION INTRAVENOUS at 08:49

## 2019-11-26 RX ADMIN — FENTANYL CITRATE 25 MCG: 50 INJECTION, SOLUTION INTRAMUSCULAR; INTRAVENOUS at 12:19

## 2019-11-26 RX ADMIN — SODIUM CHLORIDE 125 ML/HR: 900 INJECTION, SOLUTION INTRAVENOUS at 12:44

## 2019-11-26 RX ADMIN — OXYCODONE HYDROCHLORIDE 10 MG: 5 TABLET ORAL at 23:13

## 2019-11-26 RX ADMIN — Medication 10 ML: at 18:40

## 2019-11-26 RX ADMIN — PROPOFOL 150 MG: 10 INJECTION, EMULSION INTRAVENOUS at 09:00

## 2019-11-26 RX ADMIN — TRAZODONE HYDROCHLORIDE 50 MG: 50 TABLET ORAL at 21:52

## 2019-11-26 RX ADMIN — OXYCODONE HYDROCHLORIDE 10 MG: 5 TABLET ORAL at 20:03

## 2019-11-26 RX ADMIN — FENTANYL CITRATE 25 MCG: 50 INJECTION, SOLUTION INTRAMUSCULAR; INTRAVENOUS at 12:34

## 2019-11-26 RX ADMIN — GABAPENTIN 900 MG: 300 CAPSULE ORAL at 21:51

## 2019-11-26 RX ADMIN — DEXMEDETOMIDINE HYDROCHLORIDE 8 MCG: 100 INJECTION, SOLUTION, CONCENTRATE INTRAVENOUS at 09:58

## 2019-11-26 RX ADMIN — DEXAMETHASONE SODIUM PHOSPHATE 4 MG: 4 INJECTION, SOLUTION INTRAMUSCULAR; INTRAVENOUS at 09:23

## 2019-11-26 RX ADMIN — KETAMINE HYDROCHLORIDE 20 MG: 10 INJECTION, SOLUTION INTRAMUSCULAR; INTRAVENOUS at 09:38

## 2019-11-26 RX ADMIN — FENTANYL CITRATE 50 MCG: 50 INJECTION, SOLUTION INTRAMUSCULAR; INTRAVENOUS at 09:38

## 2019-11-26 RX ADMIN — SODIUM CHLORIDE, SODIUM LACTATE, POTASSIUM CHLORIDE, AND CALCIUM CHLORIDE 50 ML/HR: 600; 310; 30; 20 INJECTION, SOLUTION INTRAVENOUS at 08:44

## 2019-11-26 RX ADMIN — MIDAZOLAM 0.5 MG: 1 INJECTION INTRAMUSCULAR; INTRAVENOUS at 12:34

## 2019-11-26 RX ADMIN — MIDAZOLAM 0.5 MG: 1 INJECTION INTRAMUSCULAR; INTRAVENOUS at 12:48

## 2019-11-26 RX ADMIN — Medication 80 MCG: at 09:08

## 2019-11-26 RX ADMIN — Medication 2 G: at 09:23

## 2019-11-26 RX ADMIN — FENTANYL CITRATE 25 MCG: 50 INJECTION, SOLUTION INTRAMUSCULAR; INTRAVENOUS at 12:47

## 2019-11-26 RX ADMIN — LABETALOL HYDROCHLORIDE 10 MG: 5 INJECTION INTRAVENOUS at 10:02

## 2019-11-26 RX ADMIN — CELECOXIB 200 MG: 200 CAPSULE ORAL at 08:50

## 2019-11-26 RX ADMIN — HYDROMORPHONE HYDROCHLORIDE 0.2 MG: 1 INJECTION, SOLUTION INTRAMUSCULAR; INTRAVENOUS; SUBCUTANEOUS at 13:05

## 2019-11-26 RX ADMIN — OXYCODONE HYDROCHLORIDE 10 MG: 5 TABLET ORAL at 16:08

## 2019-11-26 RX ADMIN — SODIUM CHLORIDE 125 ML/HR: 900 INJECTION, SOLUTION INTRAVENOUS at 21:44

## 2019-11-26 RX ADMIN — PREGABALIN 75 MG: 75 CAPSULE ORAL at 08:50

## 2019-11-26 RX ADMIN — PROPOFOL 50 MG: 10 INJECTION, EMULSION INTRAVENOUS at 10:18

## 2019-11-26 RX ADMIN — MIDAZOLAM 3 MG: 1 INJECTION INTRAMUSCULAR; INTRAVENOUS at 08:53

## 2019-11-26 RX ADMIN — CLONIDINE HYDROCHLORIDE 0.1 MG: 0.1 TABLET ORAL at 21:44

## 2019-11-26 RX ADMIN — ARFORMOTEROL TARTRATE 15 MCG: 15 SOLUTION RESPIRATORY (INHALATION) at 20:51

## 2019-11-26 RX ADMIN — ACETAMINOPHEN 1000 MG: 500 TABLET ORAL at 08:50

## 2019-11-26 RX ADMIN — DEXMEDETOMIDINE HYDROCHLORIDE 8 MCG: 100 INJECTION, SOLUTION, CONCENTRATE INTRAVENOUS at 09:49

## 2019-11-26 RX ADMIN — LABETALOL HYDROCHLORIDE 5 MG: 5 INJECTION INTRAVENOUS at 09:59

## 2019-11-26 RX ADMIN — ACETAMINOPHEN 500 MG: 500 TABLET ORAL at 18:39

## 2019-11-26 RX ADMIN — PHENYLEPHRINE HYDROCHLORIDE 80 MCG/MIN: 10 INJECTION INTRAVENOUS at 09:04

## 2019-11-26 NOTE — OP NOTES
[address]   OPERATIVE REPORT    Name:  Cecily Nava  MR#:  [id number]  :  1965  ACCOUNT #:  [de-identified]  DATE OF SERVICE:  2019      PREOPERATIVE DIAGNOSIS:  Osteoarthritis, right hip. POSTOPERATIVE DIAGNOSIS:  Osteoarthritis, right hip. PROCEDURE PERFORMED:  Right total hip arthroplasty. SURGEON:  Caleb Jewell MD    ASSISTANT:  Paul Rea PA-C    ANESTHESIA:  General.    COMPLICATIONS:  None. SPECIMENS REMOVED:  None. IMPLANTS:  Components implanted:  DonJoy 52-mm FMP acetabular shell with 2 cancellous screws and 36-mm inside diameter neutral polyethylene liner, size 11 standard offset TaperFill femoral stem with 36 mm +4 ceramic femoral head. ESTIMATED BLOOD LOSS:  450 mL. INDICATIONS:  The patient is a 25-year-old female with progressive right hip and groin pain due to severe osteoarthritis. Symptoms have progressed despite comprehensive conservative treatment. She presents for right total hip replacement. Risks, benefits, and alternatives of procedure reviewed with her in detail and she desires to proceed. She understands increased risk for perioperative medical complications including infection due to obesity. She has lost significant amount of weight over the last year or so to get ready for this procedure. PROCEDURE IN DETAIL:  The patient was taken to the operating room and placed supine on the operating table. Preoperative IV antibiotics were administered. Due to history of multiple spine procedures of presence of a spinal cord stimulator, attempts at spinal anesthesia was not made. After induction of general anesthesia, she was turned to left lateral decubitus position on a [facility] frame hip positioner. All bony prominences were well-padded and an axillary roll was placed. The right hip and thigh were prepped and draped in the usual sterile fashion.   Through a lateral hip incision, I performed a posterior approach to the right hip.  Short rotators and posterior capsule were reflected posteriorly. Leg lengths were checked on the table for comparison with trial reduction later during the procedure. Hip was dislocated and femoral neck osteotomy was made. Acetabular retractors were placed and labrum was excised. The acetabulum was reamed with hemispherical reamers up to 51 mm before impacting a 52 mm FMP acetabular shell. Intrinsic stability was satisfactory and initially fixation was augmented with one cancellous screw which provided a very poor purchase, so a second screw was inserted. The 36 trial liner was placed. The femur was prepared with TaperFill broaches up to size 11 before achieving rotational stability. Calcar was planed. Based on native anatomy, hip was reduced with a standard offset neck trial.  A 36 +4 head trial produced satisfactory leg length and soft tissue tension. Hip was stable to full extension and external rotation with no internal impingement, stable to straight hyperflexion, and with the hip flexed to 90 degrees and neutral abduction, there was greater than 60-70 degrees of internal rotation. Trials were removed. The wound was copiously irrigated by pulse lavage before the real components were implanted. Periarticular soft tissues were injected with solution containing 0.5% ropivacaine with epinephrine as well as clonidine and Toradol. Posterior capsule was repaired to the inner aspect of posterior greater trochanter through drill holes using #2 Ethibond sutures. Deep fascia was closed with a combination of heavy Vicryl sutures and running #2 Stratafix suture. Skin and subcutaneous layers were closed with a combination of interrupted Vicryl sutures and a running Monocryl subcuticular stitch. Multiple layers of very deep adipose tissue were closed to eliminate dead space. The wound was dressed with Dermabond and an Aquacel occlusive dressing.   The patient was awakened, extubated, and transported to the postanesthesia care unit in stable condition. All counts were correct at the end of the procedure. The physician assistant was critical throughout the case to assist with positioning, retraction, and closure. There were no other available residents, fellows, or surgical assistants available to assist during this procedure.         Sarwat Vazquez MD      [id number]  D:  11/26/2019 13:59  T:  11/26/2019 16:55  JOB #:  [id number]  CC:  Bel Marcial MD

## 2019-11-26 NOTE — DISCHARGE SUMMARY
[address]     DISCHARGE SUMMARY     Name: Makayla Figueroa       MR#: [id number]    : 1965  ADMIT DATE: 2019  DISCHARGE DATE: 2019     ADMISSION DIAGNOSIS: Primary osteoarthritis of right hip [M16.11]     DISCHARGE DIAGNOSIS: OSTEOARTHRITIS RIGHT HIP     PROCEDURE PERFORMED: Procedure(s):  RIGHT TOTAL HIP ARTHROPLASTY (SPINAL WITH IV SED)     CONSULTATIONS:  None.     HISTORY OF PRESENT ILLNESS: The patient is a 60-year-old female with progressive right hip and groin pain due to severe osteoarthritis. Symptoms have progressed despite comprehensive conservative treatment. She presents for right total hip replacement. Risks, benefits, and alternatives of procedure reviewed with her in detail and she desires to proceed. She understands increased risk for perioperative medical complications including infection due to obesity. She has lost significant amount of weight over the last year or so to get ready for this procedure.     HOSPITAL COURSE:  The patient underwent the aforementioned procedure on date of admission under general anesthesia with adductor canal block. There were no immediate postoperative complications. She was started on a multimodal pain regimen and DVT prophylaxis.     DISPOSITION: The patient made slow, steady progress with physical therapy and was appropriate for discharge to Home in stable condition on postoperative day 1. DISCHARGE MEDICATIONS:  Reinitiate preadmission medications. In addition, the patient will be on eliquis for DVT prophylaxis and low dose oxycodone and Tylenol for pain. DISCHARGE INSTRUCTIONS:  Detailed printed instructions were provided to the patient. Follow up with Dr. Kevin Fraga in approximately 3 weeks. The patient will receive home health physical therapy in the meantime.     Signed by: Yesica Goodson PA-C  2019

## 2019-11-26 NOTE — DISCHARGE INSTRUCTIONS
Post-op Discharge Instructions Following Total Joint Replacement  Little Cramer MD  [address]  [phone], ext [phone]  Follow-up Office Visit   See Dr. Evangelista Renee approximately 3-4 weeks from date of surgery. Call [phone], ext [phone] to make an appointment. Activity   Use your walker for ambulation. Weight bearing as tolerated unless instructed otherwise by the physical therapist. Get up every hour you are awake and take a brief walk. Lengthen walking distance daily as your strength improves.  Continue using your walker until seen in the office for your first follow up visit.  Practice your exercises 3 times daily as instructed by the physical therapist. Bharath Ontiveros for 20 minutes after exercising.  No driving until seen in the office for your first follow up visit. Incision Care   The light brown Aquacel surgical dressing is waterproof and is to remain on your incision for 7 days. On the 7th day, carefully lift the edge of the dressing to break the adhesive seal and gently peel it off.  If your Aquacel dressings comes loose or falls off before the 7th day, replace it with a dry sterile gauze dressing and change this dressing daily. Once there is no drainage on the bandage, you mean leave the incision open to air.  You may take a shower with the Aquacel dressing in place. After you remove the Aquacel dressing on day 7, you may continue to shower and get your incision wet in the shower. Do not submerge your incision under water in a bathtub, hot tub, swimming pool, etc. until after you have been evaluated at your first office visit. Medications   Blood Clot Prevention: Take medication as prescribed by your physician for 4 weeks postop.  Pain Management: Take pain medication as prescribed; wean yourself off of pain medication as your pain lessens. Take with food.  You make also take Tylenol every 4-6 hours as needed for pain. Do not exceed 3 grams (3000mg) per day.    Place an ice bag on or around the incision for 20 minutes on / 20 minutes off as needed throughout the day and night, especially after exercising.  Stool Softener: You may want to take a stool softener (such as Senokot-S or Colace) to prevent constipation while taking pain medication. If constipation occurs, you may also use a laxative (such as Dulcolax tablets, Miralax, or a suppository). Diet   Resume usual diet at home. Drink plenty of fluids. Eat foods high in fiber and protein. Calcium and Vitamin D supplements recommended. Avoid alcoholic beverages. No smoking. When to call your Orthopaedic Surgeon: If you call after 5pm or on a weekend, the on call physician will return your call   Pain that is not relieved by pain medication, ice, and activity modification   Signs of infection (red incision, continuous drainage from the incision, malodorous drainage, persistent fever greater than 101 degrees Fahrenheit)   Signs of a blood clot in your leg (calf pain, tenderness, redness, and/or swelling of the lower leg)  ?   When to call your Primary Care Physician   Concerns about your medical conditions such as diabetes, high blood pressure, asthma, congestive heart failure   Call if blood sugars are elevated, if you have a persistent headache or dizziness, coughing or congestion, constipation or diarrhea, burning with urination, abnormal heart rate (fast or slow)  When to call 911 and go to the nearest Emergency Room   Acute onset of chest pain, shortness of breath, difficulty breathing

## 2019-11-26 NOTE — ANESTHESIA POSTPROCEDURE EVALUATION
Post-Anesthesia Evaluation and Assessment    Patient: Cookie Fajardo MRN: [id number]  SSN: [id number]    YOB: 1965  Age: 49 y.o. Sex: female      I have evaluated the patient and they are stable and ready for discharge from the PACU. Cardiovascular Function/Vital Signs  Visit Vitals  /71   Pulse 72   Temp 36.6 °C (97.9 °F)   Resp 12   Ht 5' 3\" (1.6 m)   Wt 104.1 kg (229 lb 8 oz)   SpO2 100%   BMI 40.65 kg/m²       Patient is status post Spinal anesthesia for Procedure(s):  RIGHT TOTAL HIP ARTHROPLASTY (SPINAL WITH IV SED). Nausea/Vomiting: None    Postoperative hydration reviewed and adequate. Pain:  Pain Scale 1: Numeric (0 - 10) (11/26/19 1220)  Pain Intensity 1: 10 (11/26/19 1220)   Managed    Neurological Status:   Neuro (WDL): Within Defined Limits (11/26/19 1259)   At baseline    Mental Status, Level of Consciousness: Alert and  oriented to person, place, and time    Pulmonary Status:   O2 Device: Nasal cannula (11/26/19 1259)   Adequate oxygenation and airway patent    Complications related to anesthesia: None    Post-anesthesia assessment completed.  No concerns    Signed By: Rubio Chirinos MD     November 26, 2019

## 2019-11-26 NOTE — PERIOP NOTES
Patient: Angela Adair MRN: [id number]  SSN: [id number]   YOB: 1965  Age: 49 y.o. Sex: female     Patient is status post Procedure(s):  RIGHT TOTAL HIP ARTHROPLASTY (SPINAL WITH IV SED). Surgeon(s) and Role:     Quyen Lim MD - Primary    Local/Dose/Irrigation:  See Titus Altamirano                  Peripheral IV 11/26/19 Right Hand (Active)   Site Assessment Clean, dry, & intact 11/26/2019  8:43 AM   Phlebitis Assessment 0 11/26/2019  8:43 AM   Infiltration Assessment 0 11/26/2019  8:43 AM   Dressing Status Clean, dry, & intact; New 11/26/2019  8:43 AM   Dressing Type Tape;Transparent 11/26/2019  8:43 AM   Hub Color/Line Status Blue 11/26/2019  8:43 AM            Airway - Endotracheal Tube 11/26/19 Oral (Active)                   Dressing/Packing:  Wound Hip Right-Dressing Type: Topical skin adhesive/glue; Aquacel (11/26/19 1126)     Other:

## 2019-11-26 NOTE — ANESTHESIA PREPROCEDURE EVALUATION
Anesthetic History     PONV          Review of Systems / Medical History  Patient summary reviewed, nursing notes reviewed and pertinent labs reviewed    Pulmonary        Sleep apnea    Asthma     Comments: Sarcoidosis   Neuro/Psych         Neuromuscular disease     Cardiovascular    Hypertension                Comments: Pulmonary HTN   GI/Hepatic/Renal     GERD           Endo/Other        Morbid obesity and arthritis     Other Findings   Comments: Muscular dystrophy biopsy proven  Spinal fusion  Spinal cord stimulator         Physical Exam    Airway  Mallampati: II  TM Distance: 4 - 6 cm  Neck ROM: normal range of motion   Mouth opening: Normal     Cardiovascular    Rhythm: regular  Rate: normal         Dental    Dentition: Poor dentition, Caps/crowns and Upper partial plate     Pulmonary  Breath sounds clear to auscultation               Abdominal  Abdominal exam normal       Other Findings            Anesthetic Plan    ASA: 3  Anesthesia type: general          Induction: Intravenous  Anesthetic plan and risks discussed with: Patient      Plan for GETA with real and sugammadex due to hx of neuromuscular disease and lumbar fusion and spinal cord stimulator.

## 2019-11-26 NOTE — PROGRESS NOTES
Problem: Mobility Impaired (Adult and Pediatric)  Goal: *Acute Goals and Plan of Care (Insert Text)  Description  FUNCTIONAL STATUS PRIOR TO ADMISSION: Patient was independent and active without use of DME.    HOME SUPPORT PRIOR TO ADMISSION: The patient lived with family but did not require assist.    Physical Therapy Goals  Initiated 11/26/2019    1. Patient will move from supine to sit and sit to supine  and roll side to side in bed with modified independence within 4 days. 2. Patient will perform sit to stand with modified independence within 4 days. 3. Patient will ambulate with modified independence for 150 feet with the least restrictive device within 4 days. 4. Patient will ascend/descend 4 stairs with 1 handrail(s) with modified independence within 4 days. 5. Patient will perform AMANDA home exercise program per protocol with modified independence within 4 days. Outcome: Progressing Towards Goal   PHYSICAL THERAPY EVALUATION  Patient: Long Fan (88 y.o. female)  Date: 11/26/2019  Primary Diagnosis: Primary osteoarthritis of right hip [M16.11]  Procedure(s) (LRB):  RIGHT TOTAL HIP ARTHROPLASTY (SPINAL WITH IV SED) (Right) Day of Surgery   Precautions:   Fall, WBAT      ASSESSMENT  Based on the objective data described below, the patient presents with decreased mobility and pain after R AMANDA POD0. She has a complex medical history including L AMANDA with post op infection requiring wound vac, several spinal fusions with spine stimulator and breast cancer with mastectomy and complicated reconstruction. Today, she was able to mobilize with VSS and good overall pain control. She moves very slowly due to pain, but reported less pain with activity compared with in the bed. She has a walker at home already and will only need to navigate entry stairs as she plans to stay in the downstairs bedroom initially. Expect that she will progress steadily and be able to return home with HHPT per pathway.     Current Level of Function Impacting Discharge (mobility/balance): min assist for short distance ambulation    Functional Outcome Measure: The patient scored Total: 55/100 on the Barthel Index which is indicative of moderately impaired ability to care for basic self needs/dependency on others. Other factors to consider for discharge: none     Patient will benefit from skilled therapy intervention to address the above noted impairments. PLAN :  Recommendations and Planned Interventions: bed mobility training, transfer training, gait training, therapeutic exercises, patient and family training/education, and therapeutic activities      Frequency/Duration: Patient will be followed by physical therapy:  twice daily to address goals. Recommendation for discharge: (in order for the patient to meet his/her long term goals)  Physical therapy at least 2 days/week in the home AND ensure assist and/or supervision for safety with stairs     This discharge recommendation:  Has been made in collaboration with the attending provider and/or case management    IF patient discharges home will need the following DME: patient owns DME required for discharge         SUBJECTIVE:   Patient stated I think I need to move.     OBJECTIVE DATA SUMMARY:   HISTORY:    Past Medical History:   Diagnosis Date    Arthritis     Asthma     Cancer ([address] 2012    LEFT BREAST    Chronic pain     LOWER BACK, SCIATICA, RIGHT HIP PAIN    Fall     GERD (gastroesophageal reflux disease)     Headache     Hypertension     Morbid obesity ([address]     Muscular dystrophy ([address]     Nausea & vomiting     USED SCOPALAMINE PATCH IN PAST    Pulmonary hypertension ([address]     Sarcoidosis     IN LUNGS  RESOLVED      Sleep apnea     USES BIPAP  PT DOES NOT USE DUE TO FACIAL INJURY     Past Surgical History:   Procedure Laterality Date    CHEST SURGERY PROCEDURE UNLISTED  2000    BIOPSY OUTER LUNG    HX ABDOMINAL LAPAROSCOPY  05/23/2019    LAPAROSCOPIC PARTIAL GASTRECTOMY WITH RESECTION OF SMALL BOWEL , LYSIS OF ADHSION , HIATAL HEANIA REPAIR    HX BREAST RECONSTRUCTION  2012    HX BREAST RECONSTRUCTION  2012     3 SURGERIES TO REMOVE DEAD TISSUE    HX BREAST RECONSTRUCTION  2013 2015    LATISAMUS FLAP, IMPLANTS FAT GRAFTING     HX BREAST RECONSTRUCTION      13 TOTAL SURGERIES    HX CERVICAL FUSION  2012    C5-C6 hardware    HX CHOLECYSTECTOMY  1998    HX COLPOSCOPY  2001    HX GASTRIC BYPASS  2003    HX GYN  2001    CERVIX CONE PROCEDURE    HX GYN  2008    EUTERO ABLATION    HX HEENT      HX HEENT  05/29/2017    DOG BITE TO FACE     HX HEENT  09/15/2017    DOG BITE TO FACE     HX HEENT Bilateral 08/20/2018    EYE /NOSE FROM DOG BITE    HX HYSTERECTOMY  2014    HX HYSTEROSCOPY WITH ENDOMETRIAL ABLATION  2008    HX KNEE ARTHROSCOPY Right 2016    HX LUMBAR FUSION  2011    L5-S1 hardware    HX MASTECTOMY Bilateral 2012    HX ORTHOPAEDIC Right 2010    BONE GRAFT SCAFOID BONE WRIST    HX ORTHOPAEDIC Left 2016    THR    HX ORTHOPAEDIC Right 09/28/2015    MUSCLE BIOPSY -MADD MUSCULAR DYSTROPHY     HX OTHER SURGICAL  2013    PAIN STIMULATOR    HX OTHER SURGICAL  2015    MUSCLE BIOPSY     HX RHINOPLASTY  6478,8448    X 2    HX TONSILLECTOMY  1982    HX UROLOGICAL  02/08/2018    CYSTOSCOPY WITH BLADDER BIOPSY     HX UROLOGICAL  2018    KIDNEY STONE REMOVAL    IR BRONCHOSCOPY      NEUROLOGICAL PROCEDURE UNLISTED  11/16/2017    REPLACE RELOCATE BATTERY FOR STIMULATOR     NEUROLOGICAL PROCEDURE UNLISTED  2013    PAIN STIMULATOR IMPLANTED T8-T9       Personal factors and/or comorbidities impacting plan of care: R AMANDA, spine pain, chronic pain, obesity         EXAMINATION/PRESENTATION/DECISION MAKING:   Critical Behavior:              Hearing:     Skin:  post op dressing in place with no drainage noted  Edema: none  Range Of Motion:  AROM: Within functional limits(limited by pain RLE)                       Strength:    Strength:  Within functional limits(RLE 3/5 due to pain)                    Tone & Sensation:   Tone: Normal              Sensation: Intact               Coordination:  Coordination: Within functional limits  Vision:      Functional Mobility:  Bed Mobility:     Supine to Sit: Moderate assistance; Additional time(for RLE)  Sit to Supine: Moderate assistance; Additional time(for RLE due to pain)     Transfers:  Sit to Stand: Minimum assistance; Adaptive equipment; Additional time  Stand to Sit: Minimum assistance; Adaptive equipment; Additional time        Bed to Chair: Minimum assistance; Adaptive equipment; Additional time              Balance:   Sitting: Intact; Without support  Standing: Intact; With support  Ambulation/Gait Training:  Distance (ft): 40 Feet (ft)  Assistive Device: Gait belt;Walker, rolling  Ambulation - Level of Assistance: Minimal assistance; Adaptive equipment; Additional time        Gait Abnormalities: Antalgic;Decreased step clearance; Step to gait  Right Side Weight Bearing: As tolerated  Left Side Weight Bearing: Full  Base of Support: Widened  Stance: Right decreased  Speed/Rebekah: Pace decreased (<100 feet/min)  Step Length: Left shortened;Right shortened  Swing Pattern: Right asymmetrical     Interventions: Safety awareness training; Tactile cues; Verbal cues; Visual/Demos            Stairs: Therapeutic Exercises: Ankle pumps    Functional Measure:  Barthel Index:    Bathin  Bladder: 10  Bowels: 10  Groomin  Dressin  Feeding: 10  Mobility: 0  Stairs: 0  Toilet Use: 5  Transfer (Bed to Chair and Back): 10  Total: 55/100       The Barthel ADL Index: Guidelines  1. The index should be used as a record of what a patient does, not as a record of what a patient could do. 2. The main aim is to establish degree of independence from any help, physical or verbal, however minor and for whatever reason. 3. The need for supervision renders the patient not independent. 4. A patient's performance should be established using the best available evidence.  Asking the patient, friends/relatives and nurses are the usual sources, but direct observation and common sense are also important. However direct testing is not needed. 5. Usually the patient's performance over the preceding 24-48 hours is important, but occasionally longer periods will be relevant. 6. Middle categories imply that the patient supplies over 50 per cent of the effort. 7. Use of aids to be independent is allowed. Bevelyn Code., Barthel, D.W. (8913). Functional evaluation: the Barthel Index. [facility] (14)2. Mina Hong conor CLIFFORD Gabriel, Terese Castleman., Barney Moralest., Elmer, [address] [facility] (1999). Measuring the change indisability after inpatient rehabilitation; comparison of the responsiveness of the Barthel Index and Functional [place] Measure. Journal of Neurology, Neurosurgery, and Psychiatry, 66(4), 129-267. Jayro Lara, NROSA.BLAISE, CATALINA Clifton, & Lilia Jane MCandiceA. (2004.) Assessment of post-stroke quality of life in cost-effectiveness studies: The usefulness of the Barthel Index and the EuroQoL-5D. Quality of Life Research, 15, 571-55        Physical Therapy Evaluation Charge Determination   History Examination Presentation Decision-Making   HIGH Complexity :3+ comorbidities / personal factors will impact the outcome/ POC  MEDIUM Complexity : 3 Standardized tests and measures addressing body structure, function, activity limitation and / or participation in recreation  LOW Complexity : Stable, uncomplicated  LOW Complexity : FOTO score of       Based on the above components, the patient evaluation is determined to be of the following complexity level: LOW     Pain Rating:  Decreased pain after mobility and improved with ice    Activity Tolerance:   Good  Please refer to the flowsheet for vital signs taken during this treatment.     After treatment patient left in no apparent distress:   Supine in bed, Call bell within reach, Caregiver / family present, Side rails x 3, and ice in place R hip COMMUNICATION/EDUCATION:   The patients plan of care was discussed with: Registered Nurse. Fall prevention education was provided and the patient/caregiver indicated understanding., Patient/family have participated as able in goal setting and plan of care. , and Patient/family agree to work toward stated goals and plan of care.     Thank you for this referral.  Katrin Barney, PT   Time Calculation: 35 mins

## 2019-11-26 NOTE — PERIOP NOTES
TRANSFER - OUT REPORT:    Verbal report given to Yobany(name) on Jean Marques  being transferred to 55(unit) for routine post - op       Report consisted of patients Situation, Background, Assessment and   Recommendations(SBAR). Time Pre op antibiotic given:0923  Anesthesia Stop time: 3621  Kendrick Present on Transfer to floor:no  Order for Kendrick on Chart:no  Discharge Prescriptions with Chart:yes    Information from the following report(s) SBAR, Kardex, OR Summary, Procedure Summary, Intake/Output, MAR, Recent Results and Med Rec Status was reviewed with the receiving nurse. Opportunity for questions and clarification was provided. Is the patient on 02? YES       L/Min 2       Other     Is the patient on a monitor? NO    Is the nurse transporting with the patient? NO    Surgical Waiting Area notified of patient's transfer from PACU? YES      The following personal items collected during your admission accompanied patient upon transfer:   Dental Appliance: Dental Appliances: None  Vision: Visual Aid: Glasses  Hearing Aid:    Jewelry: Jewelry: None  Clothing: Clothing: Other (comment)(CLOTHING & SHOES TO PACU)  Other Valuables: Other Valuables: Eyeglasses(GLASSES TO PACU)  Valuables sent to safe:      Clothes, glasses and cane sent to floor.

## 2019-11-26 NOTE — BRIEF OP NOTE
BRIEF OPERATIVE NOTE    Date of Procedure: 11/26/2019   Preoperative Diagnosis: OSTEOARTHRITIS RIGHT HIP  Postoperative Diagnosis: OSTEOARTHRITIS RIGHT HIP    Procedure(s):  RIGHT TOTAL HIP ARTHROPLASTY (SPINAL WITH IV SED)  Surgeon(s) and Role:     Quyen Lim MD - Primary         Surgical Assistant: Parker Bustillo PA-C     Surgical Staff:  Circ-1: Vandana Dobbins RN  Circ-Intern: Michel Ford  Physician Assistant: Sara Wilkinson PA-C  Scrub Tech-1: Michelle David  Retractor Conroy: Dawna Lee  Surg Asst-1: Serena Newell  Event Time In Time Out   Incision Start 9951    Incision Close       Anesthesia: Spinal   Estimated Blood Loss: 450cc  Specimens: * No specimens in log *   Findings: right hip OA   Complications: none  Implants:   Implant Name Type Inv.  Item Serial No.  Lot No. LRB No. Used Action   Hemispherical Shell, 3-Hole Cluster 52mm   NA [employer] [id number] Right 1 Implanted   Femoral Hip Size 11   [employer] [id number] Right 1 Implanted   FMP Acetabular Linear - e-plus 36mm   [employer] [id number] Right 1 Implanted   SCREW BONE CANCELLOUS 6.5MM X 25MM - SNA Screw SCREW BONE CANCELLOUS 6.5MM X 25MM [employer] [id number] Right 1 Implanted   SCREW BONE CANCELLOUS 6.5MM X 25MM - SNA Screw SCREW BONE CANCELLOUS 6.5MM X 25MM [employer] [id number] Right 1 Implanted   HEAD FEMORAL CERAMIC 36MM +4 - SNA Joint Component HEAD FEMORAL CERAMIC 36MM +4 [employer] [id number] Right 1 Implanted

## 2019-11-27 VITALS
HEIGHT: 63 IN | TEMPERATURE: 99.6 F | SYSTOLIC BLOOD PRESSURE: 117 MMHG | RESPIRATION RATE: 16 BRPM | HEART RATE: 64 BPM | WEIGHT: 229.5 LBS | DIASTOLIC BLOOD PRESSURE: 80 MMHG | OXYGEN SATURATION: 97 % | BODY MASS INDEX: 40.66 KG/M2

## 2019-11-27 LAB
ANION GAP SERPL CALC-SCNC: 5 MMOL/L (ref 5–15)
BUN SERPL-MCNC: 9 MG/DL (ref 6–20)
BUN/CREAT SERPL: 10 (ref 12–20)
CALCIUM SERPL-MCNC: 7.9 MG/DL (ref 8.5–10.1)
CHLORIDE SERPL-SCNC: 112 MMOL/L (ref 97–108)
CO2 SERPL-SCNC: 25 MMOL/L (ref 21–32)
CREAT SERPL-MCNC: 0.87 MG/DL (ref 0.55–1.02)
GLUCOSE SERPL-MCNC: 110 MG/DL (ref 65–100)
HGB BLD-MCNC: 10.4 G/DL (ref 11.5–16)
POTASSIUM SERPL-SCNC: 4 MMOL/L (ref 3.5–5.1)
SODIUM SERPL-SCNC: 142 MMOL/L (ref 136–145)

## 2019-11-27 PROCEDURE — 74011250637 HC RX REV CODE- 250/637: Performed by: PHYSICIAN ASSISTANT

## 2019-11-27 PROCEDURE — 74011250636 HC RX REV CODE- 250/636: Performed by: PHYSICIAN ASSISTANT

## 2019-11-27 PROCEDURE — 97165 OT EVAL LOW COMPLEX 30 MIN: CPT | Performed by: OCCUPATIONAL THERAPIST

## 2019-11-27 PROCEDURE — 80048 BASIC METABOLIC PNL TOTAL CA: CPT

## 2019-11-27 PROCEDURE — 36415 COLL VENOUS BLD VENIPUNCTURE: CPT

## 2019-11-27 PROCEDURE — 97116 GAIT TRAINING THERAPY: CPT

## 2019-11-27 PROCEDURE — 85018 HEMOGLOBIN: CPT

## 2019-11-27 PROCEDURE — 97535 SELF CARE MNGMENT TRAINING: CPT | Performed by: OCCUPATIONAL THERAPIST

## 2019-11-27 RX ORDER — NALOXONE HYDROCHLORIDE 4 MG/.1ML
SPRAY NASAL
Qty: 1 EACH | Refills: 0 | Status: SHIPPED | OUTPATIENT
Start: 2019-11-27

## 2019-11-27 RX ADMIN — KETOROLAC TROMETHAMINE 15 MG: 30 INJECTION, SOLUTION INTRAMUSCULAR at 03:32

## 2019-11-27 RX ADMIN — OXYCODONE HYDROCHLORIDE 10 MG: 5 TABLET ORAL at 13:47

## 2019-11-27 RX ADMIN — ACETAMINOPHEN 500 MG: 500 TABLET ORAL at 09:39

## 2019-11-27 RX ADMIN — Medication 2 G: at 01:55

## 2019-11-27 RX ADMIN — SODIUM CHLORIDE 125 ML/HR: 900 INJECTION, SOLUTION INTRAVENOUS at 03:35

## 2019-11-27 RX ADMIN — ACETAMINOPHEN 500 MG: 500 TABLET ORAL at 01:55

## 2019-11-27 RX ADMIN — DULOXETINE HYDROCHLORIDE 60 MG: 60 CAPSULE, DELAYED RELEASE ORAL at 09:39

## 2019-11-27 RX ADMIN — Medication 10 ML: at 03:32

## 2019-11-27 RX ADMIN — OXYCODONE HYDROCHLORIDE 10 MG: 5 TABLET ORAL at 07:16

## 2019-11-27 RX ADMIN — ACETAMINOPHEN 500 MG: 500 TABLET ORAL at 13:47

## 2019-11-27 RX ADMIN — CLONIDINE HYDROCHLORIDE 0.1 MG: 0.1 TABLET ORAL at 09:39

## 2019-11-27 RX ADMIN — OXYCODONE HYDROCHLORIDE 10 MG: 5 TABLET ORAL at 10:53

## 2019-11-27 RX ADMIN — APIXABAN 2.5 MG: 2.5 TABLET, FILM COATED ORAL at 07:16

## 2019-11-27 RX ADMIN — ACETAMINOPHEN 500 MG: 500 TABLET ORAL at 07:16

## 2019-11-27 NOTE — PROGRESS NOTES
Patient instructed in all discharge information, medications, anticoagulant therapy, wound care and home health is arranged. Discharged via wc and accompanied to car by nurse.

## 2019-11-27 NOTE — ROUTINE PROCESS
Bedside shift change report given to Ramon Santillan (oncoming nurse) by Nixon Osorio (offgoing nurse). Report included the following information SBAR.

## 2019-11-27 NOTE — PROGRESS NOTES
JACKIE: home with home health. [facility] accepted patient. Care Management Interventions  PCP Verified by CM: Yes  Mode of Transport at Discharge: Other (see comment)(family/car)  Transition of Care Consult (CM Consult): Home Health, Discharge [address]: Yes  MyChart Signup: No  Discharge Durable Medical Equipment: No  Physical Therapy Consult: Yes  Occupational Therapy Consult: Yes  Speech Therapy Consult: No  Current Support Network: Lives with Spouse, Own Home  Confirm Follow Up Transport: Family  Plan discussed with Pt/Family/Caregiver: Yes  Freedom of Choice Offered: Yes  Discharge Location  Discharge Placement: Home with home health     Reason for Admission:   RIGHT TOTAL HIP ARTHROPLASTY                    RRAT Score:   14                  Plan for utilizing home health:   Offered freedom of choice, patient prefers [facility] [address], however they do not accept [employer]. Patient next choice is [facility]. Referral sent. [place] of choice letter placed on hard chart. Current Advanced Directive/Advance Care Plan: code status not on file. Advanced care plan not on file                         Transition of Care Plan:    Home with home health.     YARIEL Escalante/CRM

## 2019-11-27 NOTE — PROGRESS NOTES
Primary Nurse Lisa Pearson RN performed a dual skin assessment on this patient No impairment noted  Dread score is 21    New incision right hip    Old scars on spine;lateral sides and breast areas r/t mastectomy and breast reconstruction

## 2019-11-27 NOTE — PROGRESS NOTES
OCCUPATIONAL THERAPY EVALUATION/DISCHARGE  Patient: Giana Sanders (34 y.o. female)  Date: 11/27/2019  Primary Diagnosis: Primary osteoarthritis of right hip [M16.11]  Procedure(s) (LRB):  RIGHT TOTAL HIP ARTHROPLASTY (SPINAL WITH IV SED) (Right) 1 Day Post-Op   Precautions:  Fall, WBAT    ASSESSMENT  Based on the objective data described below, the patient presents with good safety awareness and no LOB POD 1 R AMANDA. She has good family support who will assist her as needed at home and all equipment needed for safety. Educated pt on hip precautions and safety awareness and pt demonstrated good understanding. Current Level of Function (ADLs/self-care): Min A for LE ADLs, SBA for toileting and functional mobility using RW    Functional Outcome Measure: The patient scored 70/100 on the Barthel Index outcome measure. Other factors to consider for discharge: none     PLAN :  Recommendation for discharge: (in order for the patient to meet his/her long term goals)  No skilled occupational therapy/ follow up rehabilitation needs identified at this time. This discharge recommendation:  Has not yet been discussed the attending provider and/or case management    IF patient discharges home will need the following DME: patient owns DME required for discharge       SUBJECTIVE:   Patient stated I feel ok.     OBJECTIVE DATA SUMMARY:   HISTORY:   Past Medical History:   Diagnosis Date    Arthritis     Asthma     Cancer ([address] 2012    LEFT BREAST    Chronic pain     LOWER BACK, SCIATICA, RIGHT HIP PAIN    Fall     GERD (gastroesophageal reflux disease)     Headache     Hypertension     Morbid obesity ([address]     Muscular dystrophy ([address]     Nausea & vomiting     USED SCOPALAMINE PATCH IN PAST    Pulmonary hypertension ([address]     Sarcoidosis     IN LUNGS  RESOLVED      Sleep apnea     USES BIPAP  PT DOES NOT USE DUE TO FACIAL INJURY     Past Surgical History:   Procedure Laterality Date    CHEST SURGERY PROCEDURE UNLISTED  2000    BIOPSY OUTER LUNG    HX ABDOMINAL LAPAROSCOPY  05/23/2019    LAPAROSCOPIC PARTIAL GASTRECTOMY  WITH RESECTION OF SMALL BOWEL , LYSIS OF ADHSION , HIATAL HEANIA REPAIR    HX BREAST RECONSTRUCTION  2012    HX BREAST RECONSTRUCTION  2012     3 SURGERIES TO REMOVE DEAD TISSUE    HX BREAST RECONSTRUCTION  2013 2015    LATISAMUS FLAP, IMPLANTS FAT GRAFTING     HX BREAST RECONSTRUCTION      13 TOTAL SURGERIES    HX CERVICAL FUSION  2012    C5-C6 hardware    HX CHOLECYSTECTOMY  1998    HX COLPOSCOPY  2001    HX GASTRIC BYPASS  2003    HX GYN  2001    CERVIX CONE PROCEDURE    HX GYN  2008    EUTERO ABLATION    HX HEENT      HX HEENT  05/29/2017    DOG BITE TO FACE     HX HEENT  09/15/2017    DOG BITE TO FACE     HX HEENT Bilateral 08/20/2018    EYE /NOSE FROM DOG BITE    HX HYSTERECTOMY  2014    HX HYSTEROSCOPY WITH ENDOMETRIAL ABLATION  2008    HX KNEE ARTHROSCOPY Right 2016    HX LUMBAR FUSION  2011    L5-S1 hardware    HX MASTECTOMY Bilateral 2012    HX ORTHOPAEDIC Right 2010    BONE GRAFT SCAFOID BONE WRIST    HX ORTHOPAEDIC Left 2016    THR    HX ORTHOPAEDIC Right 09/28/2015    MUSCLE BIOPSY -MADD MUSCULAR DYSTROPHY     HX OTHER SURGICAL  2013    PAIN STIMULATOR    HX OTHER SURGICAL  2015    MUSCLE BIOPSY     HX RHINOPLASTY  0799,[id number]    X 2    HX TONSILLECTOMY  1982    HX UROLOGICAL  02/08/2018    CYSTOSCOPY WITH BLADDER BIOPSY     HX UROLOGICAL  2018    KIDNEY STONE REMOVAL    IR BRONCHOSCOPY      NEUROLOGICAL PROCEDURE UNLISTED  11/16/2017    REPLACE RELOCATE BATTERY FOR STIMULATOR     NEUROLOGICAL PROCEDURE UNLISTED  2013    PAIN STIMULATOR IMPLANTED T8-T9       Prior Level of Function/Environment/Context: Mod I with ADLs and functional mobility due to chronic back pain.  Drives   Expanded or extensive additional review of patient history:     Home Situation  Home Environment: Private residence  [place] to Enter: Yes  Hand Rails : Right  One/Two Story Residence: Two story  # of Interior Steps: 23  Interior Rails: Right  Living Alone: No  Support Systems: Child(lauren), Family member(s), Spouse/Significant Other/Partner  Patient Expects to be Discharged to[de-identified] Private residence  Current DME Used/Available at Home: Geeta Elva, straight, Walker, rolling, Shower chair, Grab bars(reacher)  Tub or Shower Type: Tub/Shower combination    Hand dominance: Right    EXAMINATION OF PERFORMANCE DEFICITS:  Cognitive/Behavioral Status:  Neurologic State: Alert; Appropriate for age  Orientation Level: Oriented X4  Cognition: Appropriate decision making; Appropriate for age attention/concentration; Appropriate safety awareness; Follows commands  Perception: Appears intact  Perseveration: No perseveration noted  Safety/Judgement: Awareness of environment;Driving appropriateness; Fall prevention;Good awareness of safety precautions; Home safety; Insight into deficits    Hearing: Auditory  Auditory Impairment: None    Vision/Perceptual:    Acuity: Within Defined Limits    Corrective Lenses: Reading glasses    Range of Motion:  AROM: Generally decreased, functional                         Strength:  Strength: Generally decreased, functional                Coordination:  Coordination: Generally decreased, functional  Fine Motor Skills-Upper: Left Intact; Right Intact    Gross Motor Skills-Upper: Left Intact; Right Intact    Tone & Sensation:  Tone: Normal  Sensation: Intact                      Balance:  Sitting: Intact  Standing: Intact; With support(W)    Functional Mobility and Transfers for ADLs:  Bed Mobility:  Supine to Sit: Supervision(using gait belt as leg )  Scooting: Supervision    Transfers:  Sit to Stand: Supervision(RW)  Stand to Sit: Supervision  Bathroom Mobility: Stand-by assistance(RW)  Toilet Transfer : Stand-by assistance  Assistive Device : Walker, rolling    ADL Assessment:  The patient recalled and demonstrated hip contraindications Right LE during ADLs and functional mobility with verbal cues. Feeding: Independent    Oral Facial Hygiene/Grooming: Stand-by assistance(standing at sink)    Bathing: Minimum assistance(seated- A to reach feet)    Upper Body Dressing: Setup    Lower Body Dressing: Minimum assistance; Adaptive equipment(A to reach feet)    Toileting: Stand by assistance                ADL Intervention and task modifications:  Cognitive Retraining  Safety/Judgement: Awareness of environment;Driving appropriateness; Fall prevention;Good awareness of safety precautions; Home safety; Insight into deficits    Bathing: Patient instructed when bathing to not submerge wound in water, stand to shower or sponge bathe, cover wound with plastic and tape to ensure no water reaches bandage/wound without cues. Patient indicated understanding. Dressing joint: Patient instructed and demonstrated to don/doff Right LE first/last verbal cues. Patient instructed and demonstrated to don all clothing while sitting prior to standing, doff all clothing to knees while standing, then sit to doff clothing off from knees to feet in order to facilitate fall prevention, pain management, and energy conservation with Supervision. Home safety: Patient instructed on home modifications and safety (raise height of ADL objects, appropriate height of chair surfaces, recliner safety, change of floor surfaces, clear pathways) to increase independence and fall prevention. Patient indicated understanding. Standing: Patient instructed and demonstrated to walk up to sink/counter top/surfaces, step into walker to increase safety of joint and fall prevention with Supervision. Instructed to apply concept of hip contraindications to ADLs within the home (no extreme reaching across body to Right side, square off while using objects, slide objects along surfaces).   Patient instructed to increase amount of time standing, observe standing position during ADLs in order to increase even weight bearing through bilateral LEs in order to increase independence with ADLs. Goal to be reached 30 days post - op, per orthopedic surgeon or per PT. Patient indicated understanding. Tub transfer: Patient instructed regarding when it is safe to begin transfer into tub (complete stairs with PT, advance exercises with PT high enough to clear tub height). Patient instructed to use the same technique as used with stairs when entering and exiting tub (\"up with the non-surgical, down with the surgical leg\"). Patient indicated understanding. Functional Measure:  Barthel Index:    Bathin  Bladder: 10  Bowels: 10  Groomin  Dressin  Feeding: 10  Mobility: 10  Stairs: 5  Toilet Use: 5  Transfer (Bed to Chair and Back): 10  Total: 70/100        The Barthel ADL Index: Guidelines  1. The index should be used as a record of what a patient does, not as a record of what a patient could do. 2. The main aim is to establish degree of independence from any help, physical or verbal, however minor and for whatever reason. 3. The need for supervision renders the patient not independent. 4. A patient's performance should be established using the best available evidence. Asking the patient, friends/relatives and nurses are the usual sources, but direct observation and common sense are also important. However direct testing is not needed. 5. Usually the patient's performance over the preceding 24-48 hours is important, but occasionally longer periods will be relevant. 6. Middle categories imply that the patient supplies over 50 per cent of the effort. 7. Use of aids to be independent is allowed. Wale Shin., Barthel, D.W. (1669). Functional evaluation: the Barthel Index. [address] [facility] (14)2. CLIFFORD Price, Tran Pickens., Radha Escobar., [place], [address] [facility] (). Measuring the change indisability after inpatient rehabilitation; comparison of the responsiveness of the Barthel Index and Functional [place] Measure.  Journal of Neurology, Neurosurgery, and Psychiatry, 664), 371-711. ROSENDO Alfaro, CATALINA Clifton, & Dontae Kendall M.A. (2004.) Assessment of post-stroke quality of life in cost-effectiveness studies: The usefulness of the Barthel Index and the EuroQoL-5D. Quality of Life Research, 15, 272-14       Occupational Therapy Evaluation Charge Determination   History Examination Decision-Making   LOW Complexity : Brief history review  LOW Complexity : 1-3 performance deficits relating to physical, cognitive , or psychosocial skils that result in activity limitations and / or participation restrictions  LOW Complexity : No comorbidities that affect functional and no verbal or physical assistance needed to complete eval tasks       Based on the above components, the patient evaluation is determined to be of the following complexity level: LOW   Pain Ratin/10    Activity Tolerance:   Fair and requires rest breaks  Please refer to the flowsheet for vital signs taken during this treatment. After treatment patient left in no apparent distress:    Sitting in chair and Call bell within reach    COMMUNICATION/EDUCATION:   The patients plan of care was discussed with: Registered Nurse. Home safety education was provided and the patient/caregiver indicated understanding., Patient/family have participated as able in goal setting and plan of care. and Patient/family agree to work toward stated goals and plan of care. This patients plan of care is appropriate for delegation to [facility].     Thank you for this referral.  Monty Benton OT  Time Calculation: 29 mins

## 2019-11-27 NOTE — PROGRESS NOTES
Problem: Mobility Impaired (Adult and Pediatric)  Goal: *Acute Goals and Plan of Care (Insert Text)  Description  FUNCTIONAL STATUS PRIOR TO ADMISSION: Patient was independent and active without use of DME.    HOME SUPPORT PRIOR TO ADMISSION: The patient lived with family but did not require assist.    Physical Therapy Goals  Initiated 11/26/2019    1. Patient will move from supine to sit and sit to supine  and roll side to side in bed with modified independence within 4 days. 2. Patient will perform sit to stand with modified independence within 4 days. 3. Patient will ambulate with modified independence for 150 feet with the least restrictive device within 4 days. 4. Patient will ascend/descend 4 stairs with 1 handrail(s) with modified independence within 4 days. 5. Patient will perform AMANDA home exercise program per protocol with modified independence within 4 days. Outcome: Progressing Towards Goal     PHYSICAL THERAPY TREATMENT WITH DISCHARGE  Patient: Alice Quan (89 y.o. female)  Date: 11/27/2019  Diagnosis: Primary osteoarthritis of right hip [M16.11]   <principal problem not specified>  Procedure(s) (LRB):  RIGHT TOTAL HIP ARTHROPLASTY (SPINAL WITH IV SED) (Right) 1 Day Post-Op  Precautions: Fall, WBAT  Chart, physical therapy assessment, plan of care and goals were reviewed. ASSESSMENT  Patient continues with skilled PT services and has progressed towards goals. Patient ambulating well in the hallway with RW. Slow but steady gait pattern. Focus on RLE alignment when stepping as patient has a tendency to outwardly rotate RLE during gait. Patient cleared stairs this AM. Reviewed bed exercises in a painfree range. Patient is cleared to return home with family members. Not questions/ concerns at this time. Has equipment needed.      Other factors to consider for discharge: Has assist from family at home, multiple surgeries in the past, complex medical hx         PLAN :  Patient is cleared by physical therapy for discharge at this time. Rationale for discharge:  Goals achieved    Recommendation for discharge: (in order for the patient to meet his/her long term goals)  Physical therapy at least 2 days/week in the home     This discharge recommendation:  Has been made in collaboration with the attending provider and/or case management    IF patient discharges home will need the following DME: patient owns DME required for discharge       SUBJECTIVE:   Patient stated I live with pain, and I want to do this and go home.     OBJECTIVE DATA SUMMARY:   Critical Behavior:  Neurologic State: Alert, Appropriate for age  Orientation Level: Oriented X4  Cognition: Appropriate decision making, Appropriate for age attention/concentration, Appropriate safety awareness, Follows commands  Safety/Judgement: Awareness of environment, Driving appropriateness, Fall prevention, Good awareness of safety precautions, Home safety, Insight into deficits  Functional Mobility Training:  Bed Mobility:  Supine to Sit: Supervision(using gait belt as leg )  Sit to Supine: Modified independent  Scooting: Supervision     Transfers:  Sit to Stand: Supervision  Stand to Sit: Supervision  Bed to Chair: Stand-by assistance    Balance:  Sitting: Intact  Standing: Intact; With support  Ambulation/Gait Training:  Distance (ft): 200 Feet (ft)  Assistive Device: Gait belt;Walker, rolling  Ambulation - Level of Assistance: Stand-by assistance; Additional time; Adaptive equipment  Gait Abnormalities: Antalgic;Decreased step clearance  Right Side Weight Bearing: As tolerated  Left Side Weight Bearing: Full  Base of Support: Widened  Stance: Right decreased  Speed/Rebekah: Slow  Step Length: Right shortened;Left shortened  Swing Pattern: Right asymmetrical  Interventions: Safety awareness training;Verbal cues    Stairs:  Number of Stairs Trained: 4  Stairs - Level of Assistance: Contact guard assistance   Rail Use: Both    Therapeutic Exercises:   SUPINE  EXERCISES   Sets   Reps   Active Active Assist   Passive Self ROM   Comments   Ankle Pumps 1 10 [x]                                        []                                        []                                        []                                           Quad Sets 1 5 [x]                                        []                                        []                                        []                                           Heel Slides 1 5 []                                        [x]                                        []                                        []                                           Hip Abduction   []                                        []                                        []                                        []                                           Glut Sets 1 5 [x]                                        []                                        []                                        []                                              []                                        []                                        []                                        []                                              []                                        []                                        []                                        []                                               Pain Ratin/10 with mobility, not exacerbated with ambulation. Pain medication received approx 1 hour prior to treatment. Activity Tolerance:   Good, SpO2 stable on RA, and requires rest breaks  Please refer to the flowsheet for vital signs taken during this treatment.     After treatment patient left in no apparent distress:   Supine in bed, Call bell within reach, and Caregiver / family present    COMMUNICATION/COLLABORATION:   The patients plan of care was discussed with: Registered Nurse    Ashlyn Akhtar, PT, DPT  Geriatric Clinical Specialist Time Calculation: 24 mins

## 2019-11-27 NOTE — PROGRESS NOTES
Orthopaedics Daily Progress Note                            Date of Surgery:  11/26/2019      Patient: Erin Moore   YOB: 1965  Age: 49 y.o. SUBJECTIVE:   1 Day Post-Op following RIGHT TOTAL HIP ARTHROPLASTY (SPINAL WITH IV SED). The patient's post operative pain is controlled. No CP/SOB. No N/V. The patient's mobility will be evaluated today during PT sessions. OBJECTIVE:     Vital Signs:      Visit Vitals  BP 93/60 (BP 1 Location: Right arm, BP Patient Position: At rest)   Pulse 64   Temp 98 °F (36.7 °C)   Resp 15   Ht 5' 3\" (1.6 m)   Wt 104.1 kg (229 lb 8 oz)   SpO2 97%   Breastfeeding No   BMI 40.65 kg/m²       Physical Exam:  General: A&Ox3. The patient is cooperative, and in no acute distress. Respiratory: Respirations are unlabored. Surgical site(s): dressing clean, dry  Musculoskeletal: Calves are soft, supple, and non-tender upon palpation. Motor 5/5. Neurological:  Neurovascularly intact with good dorsi and plantar flexion. Pulses symmetrical.    Laboratory Values:             Recent Results (from the past 12 hour(s))   METABOLIC PANEL, BASIC    Collection Time: 11/27/19  4:04 AM   Result Value Ref Range    Sodium 142 136 - 145 mmol/L    Potassium 4.0 3.5 - 5.1 mmol/L    Chloride 112 (H) 97 - 108 mmol/L    CO2 25 21 - 32 mmol/L    Anion gap 5 5 - 15 mmol/L    Glucose 110 (H) 65 - 100 mg/dL    BUN 9 6 - 20 MG/DL    Creatinine 0.87 0.55 - 1.02 MG/DL    BUN/Creatinine ratio 10 (L) 12 - 20      GFR est AA >60 >60 ml/min/1.73m2    GFR est non-AA >60 >60 ml/min/1.73m2    Calcium 7.9 (L) 8.5 - 10.1 MG/DL   HEMOGLOBIN    Collection Time: 11/27/19  4:04 AM   Result Value Ref Range    HGB 10.4 (L) 11.5 - 16.0 g/dL         PLAN:     S/P RIGHT TOTAL HIP ARTHROPLASTY (SPINAL WITH IV SED) -Continue WBAT. -Mobilize and continue with PT/OT until discharged     Hemodynamics Hgb today is 10.4. Acute blood loss anemia as expected. Patient asymptomatic. Continue to monitor.      Wound Monitor postop dressing; no postop dressing changes necessary. Reinforce PRN. Post Operative Pain Pain Control: stable, mild-to-moderate joint symptoms intermittently, reasonably well controlled by current meds. - will be trying oxycodone PO only today     DVT Prophylaxis Continue with SCD'S, Ankle Pump Exercises. Eliquis     Discharge Disposition Discharge plan: Home with HHPT today.        Signed By: Amisha Slaughter PA-C  November 27, 2019 7:30 AM

## 2019-11-27 NOTE — PROGRESS NOTES
Problem: Falls - Risk of  Goal: *Absence of Falls  Description  Document Maria De Jesus Blackburn Fall Risk and appropriate interventions in the flowsheet.   Outcome: Progressing Towards Goal  Note: Fall Risk Interventions:  Mobility Interventions: Communicate number of staff needed for ambulation/transfer, Patient to call before getting OOB, PT Consult for mobility concerns, PT Consult for assist device competence, Utilize walker, cane, or other assistive device         Medication Interventions: Patient to call before getting OOB, Teach patient to arise slowly, Evaluate medications/consider consulting pharmacy    Elimination Interventions: Call light in reach, Toileting schedule/hourly rounds, Stay With Me (per policy), Patient to call for help with toileting needs              Problem: Hip Replacement: Post Op Day 1  Goal: *Hemodynamically stable  Outcome: Progressing Towards Goal

## 2020-01-10 RX ORDER — PANTOPRAZOLE SODIUM 40 MG/1
TABLET, DELAYED RELEASE ORAL
Qty: 90 TAB | Refills: 1 | Status: SHIPPED | OUTPATIENT
Start: 2020-01-10 | End: 2020-06-22

## 2020-02-14 NOTE — TELEPHONE ENCOUNTER
I called the patient and she said her Doctor has prescribed Celecoxib (Celebrex) and her Doctor wants a letter from Dr William Mcdowell stating it is okay for her to take faxed to Dr Mouna Johnson at fax number [phone], she said she is taking 100 mg at bedtime. He told her it was hard on her stomach which is why he wants a letter. She then asked me would she be able to [facility] to [place] May 1-6th as her daughter is in a cheering competition, she said if need be her surgery could be moved up earlier in March but she did say she is having some asthma problems now. I told her ai need to speak to Dr William Mcdowell and I will get back with her today or tomorrow. Pt in agreement. BLE: grossly assessed 4/5 throughout/grossly assessed due to

## 2020-03-24 ENCOUNTER — TELEPHONE (OUTPATIENT)
Dept: SURGERY | Age: 55
End: 2020-03-24

## 2020-07-08 ENCOUNTER — TELEPHONE (OUTPATIENT)
Dept: SURGERY | Age: 55
End: 2020-07-08

## 2021-03-05 ENCOUNTER — TRANSCRIBE ORDER (OUTPATIENT)
Dept: SCHEDULING | Age: 56
End: 2021-03-05

## 2021-03-05 DIAGNOSIS — M54.17 RADICULOPATHY, LUMBOSACRAL REGION: Primary | ICD-10-CM

## 2021-03-24 ENCOUNTER — TELEPHONE (OUTPATIENT)
Dept: SURGERY | Age: 56
End: 2021-03-24

## 2021-03-25 ENCOUNTER — HOSPITAL ENCOUNTER (OUTPATIENT)
Dept: CT IMAGING | Age: 56
Discharge: HOME | End: 2021-03-25
Attending: NURSE PRACTITIONER
Payer: COMMERCIAL

## 2021-03-25 DIAGNOSIS — M54.17 RADICULOPATHY, LUMBOSACRAL REGION: ICD-10-CM

## 2021-03-25 PROCEDURE — 72131 CT LUMBAR SPINE W/O DYE: CPT

## 2021-03-31 ENCOUNTER — HOSPITAL ENCOUNTER (OUTPATIENT)
Dept: GENERAL RADIOLOGY | Age: 56
Discharge: HOME | End: 2021-03-31
Attending: NURSE PRACTITIONER
Payer: COMMERCIAL

## 2021-03-31 ENCOUNTER — TRANSCRIBE ORDER (OUTPATIENT)
Dept: GENERAL RADIOLOGY | Age: 56
End: 2021-03-31

## 2021-03-31 DIAGNOSIS — M25.552 LEFT HIP PAIN: Primary | ICD-10-CM

## 2021-03-31 DIAGNOSIS — M25.552 LEFT HIP PAIN: ICD-10-CM

## 2021-03-31 PROCEDURE — 73502 X-RAY EXAM HIP UNI 2-3 VIEWS: CPT

## 2021-08-03 PROBLEM — E66.01 OBESITY, MORBID (HCC): Status: RESOLVED | Noted: 2018-03-05 | Resolved: 2021-08-03

## 2022-02-02 NOTE — TELEPHONE ENCOUNTER
Patient identified with two patient identifiers. Patient informed she needs to contact GI specialist regarding test they are performing and questions she has regarding test.  Patient given number to Dr. Jami Quesada [phone]. Patient expressed understanding will call if any other questions or concerns.
Patient is schedule for a procedure and needs more information about what her esophagus test and what she needs to do to prepare.
No

## 2022-03-18 PROBLEM — E66.01 MORBID OBESITY (HCC): Status: ACTIVE | Noted: 2019-05-23

## 2022-03-18 PROBLEM — M16.11 PRIMARY OSTEOARTHRITIS OF RIGHT HIP: Status: ACTIVE | Noted: 2019-11-26

## 2022-03-19 PROBLEM — F11.90 CHRONIC, CONTINUOUS USE OF OPIOIDS: Status: ACTIVE | Noted: 2019-05-10

## 2022-03-19 PROBLEM — G47.33 OSA TREATED WITH BIPAP: Status: ACTIVE | Noted: 2019-05-10

## 2022-03-19 PROBLEM — K21.9 GERD (GASTROESOPHAGEAL REFLUX DISEASE): Status: ACTIVE | Noted: 2019-05-10

## 2022-10-27 ENCOUNTER — ANESTHESIA (OUTPATIENT)
Dept: ENDOSCOPY | Age: 57
End: 2022-10-27
Payer: COMMERCIAL

## 2022-10-27 ENCOUNTER — HOSPITAL ENCOUNTER (OUTPATIENT)
Age: 57
Setting detail: OUTPATIENT SURGERY
Discharge: HOME | End: 2022-10-27
Attending: INTERNAL MEDICINE | Admitting: INTERNAL MEDICINE
Payer: COMMERCIAL

## 2022-10-27 ENCOUNTER — ANESTHESIA EVENT (OUTPATIENT)
Dept: ENDOSCOPY | Age: 57
End: 2022-10-27
Payer: COMMERCIAL

## 2022-10-27 VITALS
HEART RATE: 68 BPM | TEMPERATURE: 98.6 F | WEIGHT: 243.83 LBS | HEIGHT: 63 IN | OXYGEN SATURATION: 100 % | SYSTOLIC BLOOD PRESSURE: 106 MMHG | DIASTOLIC BLOOD PRESSURE: 79 MMHG | RESPIRATION RATE: 17 BRPM | BODY MASS INDEX: 43.2 KG/M2

## 2022-10-27 PROCEDURE — 88305 TISSUE EXAM BY PATHOLOGIST: CPT

## 2022-10-27 PROCEDURE — 76040000007: Performed by: INTERNAL MEDICINE

## 2022-10-27 PROCEDURE — 76060000032 HC ANESTHESIA 0.5 TO 1 HR: Performed by: INTERNAL MEDICINE

## 2022-10-27 PROCEDURE — 74011000250 HC RX REV CODE- 250

## 2022-10-27 PROCEDURE — 2709999900 HC NON-CHARGEABLE SUPPLY: Performed by: INTERNAL MEDICINE

## 2022-10-27 PROCEDURE — 77030013992 HC SNR POLYP ENDOSC BSC -B: Performed by: INTERNAL MEDICINE

## 2022-10-27 PROCEDURE — 74011250636 HC RX REV CODE- 250/636

## 2022-10-27 RX ORDER — LANOLIN ALCOHOL/MO/W.PET/CERES
CREAM (GRAM) TOPICAL
COMMUNITY

## 2022-10-27 RX ORDER — DUPILUMAB 300 MG/2ML
300 INJECTION, SOLUTION SUBCUTANEOUS
COMMUNITY

## 2022-10-27 RX ORDER — LANOLIN ALCOHOL/MO/W.PET/CERES
1 CREAM (GRAM) TOPICAL
COMMUNITY

## 2022-10-27 RX ORDER — LIDOCAINE HYDROCHLORIDE 20 MG/ML
INJECTION, SOLUTION EPIDURAL; INFILTRATION; INTRACAUDAL; PERINEURAL AS NEEDED
Status: DISCONTINUED | OUTPATIENT
Start: 2022-10-27 | End: 2022-10-27 | Stop reason: HOSPADM

## 2022-10-27 RX ORDER — FLUMAZENIL 0.1 MG/ML
0.2 INJECTION INTRAVENOUS
Status: DISCONTINUED | OUTPATIENT
Start: 2022-10-27 | End: 2022-10-27 | Stop reason: HOSPADM

## 2022-10-27 RX ORDER — SODIUM CHLORIDE 9 MG/ML
INJECTION, SOLUTION INTRAVENOUS
Status: DISCONTINUED | OUTPATIENT
Start: 2022-10-27 | End: 2022-10-27 | Stop reason: HOSPADM

## 2022-10-27 RX ORDER — ATROPINE SULFATE 0.1 MG/ML
0.5 INJECTION INTRAVENOUS
Status: DISCONTINUED | OUTPATIENT
Start: 2022-10-27 | End: 2022-10-27 | Stop reason: HOSPADM

## 2022-10-27 RX ORDER — PROPOFOL 10 MG/ML
INJECTION, EMULSION INTRAVENOUS AS NEEDED
Status: DISCONTINUED | OUTPATIENT
Start: 2022-10-27 | End: 2022-10-27 | Stop reason: HOSPADM

## 2022-10-27 RX ORDER — MIDAZOLAM HYDROCHLORIDE 1 MG/ML
.25-5 INJECTION, SOLUTION INTRAMUSCULAR; INTRAVENOUS
Status: DISCONTINUED | OUTPATIENT
Start: 2022-10-27 | End: 2022-10-27 | Stop reason: HOSPADM

## 2022-10-27 RX ORDER — NALOXONE HYDROCHLORIDE 0.4 MG/ML
0.4 INJECTION, SOLUTION INTRAMUSCULAR; INTRAVENOUS; SUBCUTANEOUS
Status: DISCONTINUED | OUTPATIENT
Start: 2022-10-27 | End: 2022-10-27 | Stop reason: HOSPADM

## 2022-10-27 RX ORDER — SODIUM CHLORIDE 9 MG/ML
50 INJECTION, SOLUTION INTRAVENOUS CONTINUOUS
Status: DISCONTINUED | OUTPATIENT
Start: 2022-10-27 | End: 2022-10-27 | Stop reason: HOSPADM

## 2022-10-27 RX ORDER — CHOLECALCIFEROL (VITAMIN D3) 125 MCG
400 CAPSULE ORAL DAILY
COMMUNITY

## 2022-10-27 RX ORDER — GUAIFENESIN 600 MG/1
600 TABLET, EXTENDED RELEASE ORAL 2 TIMES DAILY
COMMUNITY

## 2022-10-27 RX ORDER — DEXTROMETHORPHAN/PSEUDOEPHED 2.5-7.5/.8
1.2 DROPS ORAL
Status: DISCONTINUED | OUTPATIENT
Start: 2022-10-27 | End: 2022-10-27 | Stop reason: HOSPADM

## 2022-10-27 RX ORDER — EPINEPHRINE 0.1 MG/ML
1 INJECTION INTRACARDIAC; INTRAVENOUS
Status: DISCONTINUED | OUTPATIENT
Start: 2022-10-27 | End: 2022-10-27 | Stop reason: HOSPADM

## 2022-10-27 RX ORDER — BUPRENORPHINE HCL 300 MCG
300 FILM, MEDICATED (EA) BUCCAL EVERY 12 HOURS
COMMUNITY

## 2022-10-27 RX ORDER — FENTANYL CITRATE 50 UG/ML
100 INJECTION, SOLUTION INTRAMUSCULAR; INTRAVENOUS
Status: DISCONTINUED | OUTPATIENT
Start: 2022-10-27 | End: 2022-10-27 | Stop reason: HOSPADM

## 2022-10-27 RX ORDER — FLUTICASONE FUROATE, UMECLIDINIUM BROMIDE AND VILANTEROL TRIFENATATE 200; 62.5; 25 UG/1; UG/1; UG/1
1 POWDER RESPIRATORY (INHALATION) DAILY
COMMUNITY

## 2022-10-27 RX ADMIN — PROPOFOL 50 MG: 10 INJECTION, EMULSION INTRAVENOUS at 11:23

## 2022-10-27 RX ADMIN — PROPOFOL 30 MG: 10 INJECTION, EMULSION INTRAVENOUS at 11:28

## 2022-10-27 RX ADMIN — PROPOFOL 20 MG: 10 INJECTION, EMULSION INTRAVENOUS at 11:41

## 2022-10-27 RX ADMIN — PROPOFOL 50 MG: 10 INJECTION, EMULSION INTRAVENOUS at 11:18

## 2022-10-27 RX ADMIN — PROPOFOL 20 MG: 10 INJECTION, EMULSION INTRAVENOUS at 11:21

## 2022-10-27 RX ADMIN — PROPOFOL 20 MG: 10 INJECTION, EMULSION INTRAVENOUS at 11:25

## 2022-10-27 RX ADMIN — PROPOFOL 50 MG: 10 INJECTION, EMULSION INTRAVENOUS at 11:19

## 2022-10-27 RX ADMIN — PROPOFOL 20 MG: 10 INJECTION, EMULSION INTRAVENOUS at 11:45

## 2022-10-27 RX ADMIN — PROPOFOL 80 MG: 10 INJECTION, EMULSION INTRAVENOUS at 11:16

## 2022-10-27 RX ADMIN — PROPOFOL 50 MG: 10 INJECTION, EMULSION INTRAVENOUS at 11:33

## 2022-10-27 RX ADMIN — LIDOCAINE HYDROCHLORIDE 100 MG: 20 INJECTION, SOLUTION EPIDURAL; INFILTRATION; INTRACAUDAL; PERINEURAL at 11:15

## 2022-10-27 RX ADMIN — PROPOFOL 100 MG: 10 INJECTION, EMULSION INTRAVENOUS at 11:15

## 2022-10-27 RX ADMIN — SODIUM CHLORIDE: 900 INJECTION, SOLUTION INTRAVENOUS at 11:10

## 2022-10-27 RX ADMIN — PROPOFOL 20 MG: 10 INJECTION, EMULSION INTRAVENOUS at 11:42

## 2022-10-27 RX ADMIN — PROPOFOL 50 MG: 10 INJECTION, EMULSION INTRAVENOUS at 11:39

## 2022-10-27 NOTE — PROGRESS NOTES
Sean Alejandrashantal  1965  [id number]    Situation:  Verbal report received from: Tawnya Morel RN   Procedure: Procedure(s):  ESOPHAGOGASTRODUODENOSCOPY (EGD)  COLONOSCOPY  ENDOSCOPIC POLYPECTOMY    Background:    Preoperative diagnosis: GI Bleed  Right side abdominal pain  Postoperative diagnosis: 1.- s/p gastric Bypass Surgery  2.- Colonic Polyp  3.- Diverticulosis    :  Dr. Nadya Douglas  Assistant(s): Endoscopy Technician-1: Kyara Phillips  Endoscopy RN-1: Naomi Cardona RN    Specimens:   ID Type Source Tests Collected by Time Destination   1 : Polyp Preservative Colon, Ascending  Daly Hernandez MD 10/27/2022 1134 Pathology     H. Pylori  no    Assessment:    Anesthesia gave intra-procedure sedation and medications, see anesthesia flow sheet no    Intravenous fluids: NS@ KVO     Vital signs stable     Abdominal assessment: round and soft     Recommendation:  Discharge patient per MD order.   Return to floor  Family or Friend   Permission to share finding with family or friend yes

## 2022-10-27 NOTE — PERIOP NOTES

## 2022-10-27 NOTE — DISCHARGE INSTRUCTIONS
Candida Bravo  [id number]  1965    DISCHARGE INSTRUCTIONS    Results:  Gastric bypass anatomy without ulcers seen  Incidental finding of minor colonic diverticulosis, colon polyp; no origin of pain complaints identified  Discomfort:  Redness at IV site- apply warm compress to area; if redness or soreness persist- contact your physician. There may be a slight amount of blood passed from the rectum. Gaseous discomfort - walking, belching will help relieve any discomfort. You may not operate a vehicle for 12 hours. You may not engage in an occupation involving machinery or appliances for rest of today. You may not drink alcoholic beverages for at least 12 hours. Avoid making any critical decisions for at least 24 hours. DIET:   High fiber diet. Medications:                Resume usual medications today   ACTIVITY:  You may resume your normal daily activities it is recommended that you spend the remainder of the day resting -  avoid any strenuous activity. CALL M.D.   ANY SIGN OF:   Increasing pain, nausea, vomiting  Abdominal distension (swelling)  New increased bleeding (oral or rectal)  Fever (chills)  Pain in chest area  Bloody discharge from nose or mouth  Shortness of breath     Follow-up Instructions:  Call Dr. Ramos Peres in 5 years for follow-up colonoscopy  Please follow-up with orthopedic surgery who had cared for your back in the past    DISCHARGE SUMMARY from Nurse    The following personal items collected during your admission are returned to you:   Dental Appliance: Dental Appliances: None  Vision: Visual Aid: None  Hearing Aid:    Jewelry:    Clothing:    Other Valuables:    Valuables sent to safe:

## 2022-10-27 NOTE — PROGRESS NOTES
Endoscopy discharge instructions have been reviewed and given to patient. The patient verbalized understanding and acceptance of instructions. Dr. Katja Terrell discussed with patient procedure findings and next steps.

## 2022-10-27 NOTE — ANESTHESIA POSTPROCEDURE EVALUATION
Procedure(s):  ESOPHAGOGASTRODUODENOSCOPY (EGD)  COLONOSCOPY  ENDOSCOPIC POLYPECTOMY. MAC    Anesthesia Post Evaluation      Multimodal analgesia: multimodal analgesia not used between 6 hours prior to anesthesia start to PACU discharge  Patient location during evaluation: PACU  Patient participation: complete - patient participated  Level of consciousness: awake and alert  Pain score: 0  Pain management: adequate  Airway patency: patent  Anesthetic complications: no  Cardiovascular status: hemodynamically stable and acceptable  Respiratory status: acceptable  Hydration status: acceptable  Comments: Patient seen and evaluated; no concerns. Post anesthesia nausea and vomiting:  none      INITIAL Post-op Vital signs:   Vitals Value Taken Time   /51 10/27/22 1207   Temp 37 °C (98.6 °F) 10/27/22 1200   Pulse 80 10/27/22 1208   Resp 24 10/27/22 1208   SpO2 100 % 10/27/22 1208   Vitals shown include unvalidated device data.

## 2022-10-27 NOTE — H&P
Patient Name: Savanna Black  Gender: Female   (age): 1965 (65)    Referring Physician:    Sathya Miranda MD  [address] [place] [address].., [address]  [phone] (phone)  [phone] (fax)     Chief Complaint:    abdominal pain     History of Present Illness: The patient complains of diarrhea. Diarrheal symptoms started 10 - 15 weeks ago. Bowel movements have been occurring 5 - 7 times per day. At the onset, diarrhea started continuously. Stools have been occurring at random times of the day. The stool is described as loose in consistency. The patient has seen blood in the stool. The patient has also noted RLQ abdominal pain, nausea and vomiting. Possible exposures/etiologies include none. Stool studies for microbial analysis accomplished, reviewed; these are without diagnosis. Discussed differential diagnosis, EGD, colonoscopy, potential alternatives complications; all questions answered. Past Medical History  Medical Conditions:   Asthma  breast cancer  spinal pain stimulator  Tracheobronchomalacia  Surgical Procedures:    Other major surgeries:, spinal fusion  Hip Replacement  Gastric By-Pass  Hysterectomy  Dx Studies:   No Prior Diagnostic Studies  Medications:   albuterol sulfate 90 mcg/actuation Inhale 2 puff by mouth every four hours  B12 5,000-100 mcg Take 1 lozenge by mouth once a day  bupropion HCl 150 mg Take 1 tablet by mouth once a day  Caltrate 600 plus D 600 mg-20 mcg (800 unit) Take 1 tablet by mouth once a day  clonidine HCl 0.1 mg Take 1 tablet by mouth once a day  Co Q-10 150 mg Take 1 capsule by mouth once a day  duloxetine 60 mg Take 1 capsule by mouth once a day  Dupixent Pen 300 mg/2 mL Inject 1 pen injector subcutaneously once a month  ferrous sulfate 325 mg (65 mg iron) Take 1 tablet by mouth once a day  gabapentin 300 mg Take 1 capsule by mouth once a day  montelukast 10 mg Take 1 tablet by mouth once a day  Mucinex 600 mg Take 1 tablet by mouth once a day  topiramate 50 mg Take 1 tablet by mouth once a day  Maury Ellipta 200-62.5-25 mcg Inhale 2 puff by mouth once a day  Zyrtec 10 mg Take 1 tablet by mouth once a day  Allergies:   Bactrim  Levaquin  morphine  Immunizations:   Influenza, seasonal, injectable, 10/01/2022  pneumococcal polysaccharide PPV23, 10/01/2022  Influenza vaccination, 10/01/2021  COVID Vaccine  zoster  Social History  Alcohol:   None  Tobacco:   Never smoker  Drugs:   None  Exercise:   None  Caffeine:   Daily. Review of Systems:  Cardiovascular: Denies chest pain, irregular heart beat, palpitations, peripheral edema, syncope, Sweats. Constitutional: Denies fatigue, fever, loss of appetite, weight gain, weight loss. ENMT: Denies nose bleeds, sore throat, hearing loss. Endocrine: Denies excessive thirst, heat intolerance. Eyes: Denies loss of vision. Gastrointestinal: Presents suffers from abdominal pain, diarrhea, nausea. Denies abdominal swelling, change in bowel habits, constipation, Bloating/gas, heartburn, jaundice, rectal bleeding, stomach cramps, vomiting, dysphagia, rectal pain, Stool incontinence, hematemesis. Genitourinary: Denies dark urine, dysuria, frequent urination, hematuria, incontinence. Hematologic/Lymphatic: Denies easy bruising, prolonged bleeding. Integumentary: Denies itching, rashes, sun sensitivity. Musculoskeletal: Denies arthritis, back pain, gout, joint pain, muscle weakness, stiffness. Neurological: Denies dizziness, fainting, frequent headaches, memory loss. Psychiatric: Denies anxiety, depression, difficulty sleeping, hallucinations, nervousness, panic attacks, paranoia. Respiratory: Denies cough, dyspnea, wheezing. Vital Signs: See nursing notes  Physical Exam:  Constitutional:  Appearance: No distress, appears comfortable. Skin:  Inspection: No rash, no jaundice.   Lungs clear to percussion and auscultation   Cardiac regular rate and rhythm  Abdomen no distention, mass lesion, focal tenderness    impressions:   Gastrointestinal hemorrhage, unspecified  Right sided abdominal pain  Vomiting    Plan:   I've discussed EGD, colonoscopy possible biopsy, polypectomy, cautery, injection, alternatives, complications including but not limited to pain, cardiopulmonary event, bleeding, perforation requiring additional blood transfusion or operative repair; all questions answered.

## 2022-10-27 NOTE — ANESTHESIA PREPROCEDURE EVALUATION
Relevant Problems   No relevant active problems       Anesthetic History     PONV          Review of Systems / Medical History  Patient summary reviewed and nursing notes reviewed    Pulmonary    COPD    Sleep apnea: No treatment    Asthma     Comments: Tracheomalacia   Neuro/Psych         Headaches    Comments: Lumbar radiculopathy Cardiovascular                  Exercise tolerance: <4 METS  Comments: Pulmonary hypertension   GI/Hepatic/Renal     GERD           Endo/Other        Morbid obesity, arthritis and cancer    Comments: Left breast cancer Other Findings   Comments: Sarcoidosis  Chronic pain           Physical Exam    Airway  Mallampati: III    Neck ROM: normal range of motion   Mouth opening: Normal     Cardiovascular    Rhythm: regular  Rate: normal         Dental      Comments: Mostly missing teeth   Pulmonary  Breath sounds clear to auscultation               Abdominal         Other Findings            Anesthetic Plan    ASA: 3  Anesthesia type: MAC            Anesthetic plan and risks discussed with: Patient      Informed consent obtained.

## 2022-10-27 NOTE — PROCEDURES
Terrence Shultz M.D. 2022    Esophagogastroduodenoscopy (EGD) Colonoscopy Procedure Note  Ivan Mariee  : 1965  [facility] Medical Record Number: [id number]      Indications:    Right-sided abdominal pain , diarrhea  Referring Physician:  Carmen Segura MD  Anesthesia/Sedation: see nursing notes  Endoscopist:  Dr. Victorino Saba  Assistants: None  Permit:  The indications, risks, benefits and alternatives were reviewed with the patient or their decision maker who was provided an opportunity to ask questions and all questions were answered. The specific risks of esophagogastroduodenoscopy with conscious sedation were reviewed, including but not limited to anesthetic complication, bleeding, adverse drug reaction, missed lesion, infection, IV site reactions, and intestinal perforation which would lead to the need for surgical repair. Alternatives to EGD including radiographic imaging, observation without testing, or laboratory testing were reviewed as well as the limitations of those alternatives discussed. After considering the options and having all their questions answered, the patient or their decision maker provided both verbal and written consent to proceed. Procedure in Detail:  After obtaining informed consent, positioning of the patient in the left lateral decubitus position, and conduction of a pre-procedure pause or \"time out\" the endoscope was introduced into the mouth and advanced to the duodenum. A careful inspection was made, and findings or interventions are described below.     Findings:   Esophagus:normal  Stomach: previous surgery -gastric bypass anatomy and no mucosal lesion appreciated  Duodenum/jejunum:  Normal proximal anastomosis and normal through the length of jejunal limb examined; distal anastomosis not reached    Complications/estimated blood loss: none    Therapies: none    Specimens: none    Implants: None           Endoscopist:  Dr. Marleny Najera  Assistants: None  Complications:  None  Estimate Blood Loss:  None    Colonoscopy is to follow    Permit:  The indications, risks, benefits and alternatives were reviewed with the patient or their decision maker who was provided an opportunity to ask questions and all questions were answered. The specific risks of colonoscopy with conscious sedation were reviewed, including but not limited to anesthetic complication, bleeding, adverse drug reaction, missed lesion, infection, IV site reactions, and intestinal perforation which would lead to the need for surgical repair. Alternatives to colonoscopy including radiographic imaging, observation without testing, or laboratory testing were reviewed including the limitations of those alternatives. After considering the options and having all their questions answered, the patient or their decision maker provided both verbal and written consent to proceed. Procedure in Detail:  After obtaining informed consent, positioning of the patient in the left lateral decubitus position, and conduction of a pre-procedure pause or \"time out\" the endoscope was introduced into the anus and advanced to the terminal ileum which is examined for 45 cm or more. The quality of the colonic preparation was good. A careful inspection was made as the colonoscope was withdrawn, findings and interventions are described below. Appendiceal orifice photographed    Findings:   Ileum is normal  Ascending colon has an 8 x 12 mm sessile polyp  There are a few diverticulosis present; no inflammation seen    Specimens:     Polyp removed cold snare    Implants: None    Complications:   None; patient tolerated the procedure well. Estimated blood loss: none    Impression:  Incidental findings of gastric bypass anatomy, colon polyp, diverticulosis; no etiology of pain, diarrhea identified.     Recommendations:     - Repeat colonoscopy in 5 years. - high fiber low fat diet  Follow-up with orthopedic surgery    Thank you for entrusting me with this patient's care. Please do not hesitate to contact me with any questions or if I can be of assistance with any of your other patients' GI needs.     Signed By: Joao Arellano MD                        October 27, 2022

## 2023-01-27 ENCOUNTER — TRANSCRIBE ORDER (OUTPATIENT)
Dept: SCHEDULING | Age: 58
End: 2023-01-27

## 2023-01-27 DIAGNOSIS — M54.12 RADICULOPATHY, CERVICAL REGION: Primary | ICD-10-CM

## 2023-02-09 ENCOUNTER — HOSPITAL ENCOUNTER (OUTPATIENT)
Dept: CT IMAGING | Age: 58
Discharge: HOME | End: 2023-02-09
Attending: NURSE PRACTITIONER
Payer: COMMERCIAL

## 2023-02-09 DIAGNOSIS — M54.12 RADICULOPATHY, CERVICAL REGION: ICD-10-CM

## 2023-02-09 PROCEDURE — 72125 CT NECK SPINE W/O DYE: CPT

## 2023-02-21 ENCOUNTER — TRANSCRIBE ORDER (OUTPATIENT)
Dept: SCHEDULING | Age: 58
End: 2023-02-21

## 2023-02-21 DIAGNOSIS — M54.6 PAIN IN THORACIC SPINE: Primary | ICD-10-CM

## 2023-02-23 ENCOUNTER — HOSPITAL ENCOUNTER (OUTPATIENT)
Dept: CT IMAGING | Age: 58
Discharge: HOME | End: 2023-02-23
Attending: NURSE PRACTITIONER
Payer: COMMERCIAL

## 2023-02-23 DIAGNOSIS — M54.6 PAIN IN THORACIC SPINE: ICD-10-CM

## 2023-02-23 PROCEDURE — 72128 CT CHEST SPINE W/O DYE: CPT

## 2023-06-26 ENCOUNTER — HOSPITAL ENCOUNTER (OUTPATIENT)
Facility: HOSPITAL | Age: 58
Discharge: HOME | End: 2023-06-29
Payer: MEDICARE

## 2023-06-26 VITALS
BODY MASS INDEX: 36.48 KG/M2 | SYSTOLIC BLOOD PRESSURE: 116 MMHG | OXYGEN SATURATION: 98 % | DIASTOLIC BLOOD PRESSURE: 81 MMHG | RESPIRATION RATE: 18 BRPM | TEMPERATURE: 98.3 F | HEIGHT: 63 IN | WEIGHT: 205.91 LBS | HEART RATE: 94 BPM

## 2023-06-26 LAB
ABO + RH BLD: NORMAL
ANION GAP SERPL CALC-SCNC: 5 MMOL/L (ref 5–15)
APPEARANCE UR: CLEAR
BACTERIA URNS QL MICRO: NEGATIVE /HPF
BILIRUB UR QL: NEGATIVE
BLOOD GROUP ANTIBODIES SERPL: NORMAL
BUN SERPL-MCNC: 8 MG/DL (ref 6–20)
BUN/CREAT SERPL: 8 (ref 12–20)
CALCIUM SERPL-MCNC: 9.1 MG/DL (ref 8.5–10.1)
CHLORIDE SERPL-SCNC: 106 MMOL/L (ref 97–108)
CO2 SERPL-SCNC: 27 MMOL/L (ref 21–32)
COLOR UR: ABNORMAL
CREAT SERPL-MCNC: 1.04 MG/DL (ref 0.55–1.02)
EKG ATRIAL RATE: 69 BPM
EKG DIAGNOSIS: NORMAL
EKG P AXIS: 12 DEGREES
EKG P-R INTERVAL: 150 MS
EKG Q-T INTERVAL: 382 MS
EKG QRS DURATION: 94 MS
EKG QTC CALCULATION (BAZETT): 409 MS
EKG R AXIS: 12 DEGREES
EKG T AXIS: -1 DEGREES
EKG VENTRICULAR RATE: 69 BPM
EPITH CASTS URNS QL MICRO: ABNORMAL /LPF
ERYTHROCYTE [DISTWIDTH] IN BLOOD BY AUTOMATED COUNT: 14.4 % (ref 11.5–14.5)
EST. AVERAGE GLUCOSE BLD GHB EST-MCNC: 94 MG/DL
GLUCOSE SERPL-MCNC: 83 MG/DL (ref 65–100)
GLUCOSE UR STRIP.AUTO-MCNC: NEGATIVE MG/DL
HBA1C MFR BLD: 4.9 % (ref 4–5.6)
HCT VFR BLD AUTO: 47.7 % (ref 35–47)
HGB BLD-MCNC: 14.8 G/DL (ref 11.5–16)
HGB UR QL STRIP: NEGATIVE
HYALINE CASTS URNS QL MICRO: ABNORMAL /LPF (ref 0–5)
INR PPP: 1 (ref 0.9–1.1)
KETONES UR QL STRIP.AUTO: NEGATIVE MG/DL
LEUKOCYTE ESTERASE UR QL STRIP.AUTO: ABNORMAL
MCH RBC QN AUTO: 30.6 PG (ref 26–34)
MCHC RBC AUTO-ENTMCNC: 31 G/DL (ref 30–36.5)
MCV RBC AUTO: 98.8 FL (ref 80–99)
NITRITE UR QL STRIP.AUTO: NEGATIVE
NRBC # BLD: 0 K/UL (ref 0–0.01)
NRBC BLD-RTO: 0 PER 100 WBC
PH UR STRIP: 6 (ref 5–8)
PLATELET # BLD AUTO: 308 K/UL (ref 150–400)
PMV BLD AUTO: 11.1 FL (ref 8.9–12.9)
POTASSIUM SERPL-SCNC: 3.7 MMOL/L (ref 3.5–5.1)
PROT UR STRIP-MCNC: NEGATIVE MG/DL
PROTHROMBIN TIME: 10.3 SEC (ref 9–11.1)
RBC # BLD AUTO: 4.83 M/UL (ref 3.8–5.2)
RBC #/AREA URNS HPF: ABNORMAL /HPF (ref 0–5)
SODIUM SERPL-SCNC: 138 MMOL/L (ref 136–145)
SP GR UR REFRACTOMETRY: 1.01 (ref 1–1.03)
SPECIMEN EXP DATE BLD: NORMAL
URINE CULTURE IF INDICATED: ABNORMAL
UROBILINOGEN UR QL STRIP.AUTO: 0.2 EU/DL (ref 0.2–1)
WBC # BLD AUTO: 6 K/UL (ref 3.6–11)
WBC URNS QL MICRO: ABNORMAL /HPF (ref 0–4)

## 2023-06-26 PROCEDURE — 86901 BLOOD TYPING SEROLOGIC RH(D): CPT

## 2023-06-26 PROCEDURE — 80048 BASIC METABOLIC PNL TOTAL CA: CPT

## 2023-06-26 PROCEDURE — 85027 COMPLETE CBC AUTOMATED: CPT

## 2023-06-26 PROCEDURE — 81001 URINALYSIS AUTO W/SCOPE: CPT

## 2023-06-26 PROCEDURE — 85610 PROTHROMBIN TIME: CPT

## 2023-06-26 PROCEDURE — 86850 RBC ANTIBODY SCREEN: CPT

## 2023-06-26 PROCEDURE — 86900 BLOOD TYPING SEROLOGIC ABO: CPT

## 2023-06-26 PROCEDURE — 83036 HEMOGLOBIN GLYCOSYLATED A1C: CPT

## 2023-06-26 PROCEDURE — 36415 COLL VENOUS BLD VENIPUNCTURE: CPT

## 2023-06-26 RX ORDER — IPRATROPIUM BROMIDE AND ALBUTEROL SULFATE 2.5; .5 MG/3ML; MG/3ML
1 SOLUTION RESPIRATORY (INHALATION) EVERY 6 HOURS
COMMUNITY

## 2023-06-26 RX ORDER — BUPROPION HYDROCHLORIDE 150 MG/1
150 TABLET ORAL EVERY MORNING
COMMUNITY

## 2023-06-26 RX ORDER — SEMAGLUTIDE 1.7 MG/.75ML
1.7 INJECTION, SOLUTION SUBCUTANEOUS
COMMUNITY

## 2023-06-26 RX ORDER — SODIUM CHLORIDE FOR INHALATION 3.5 %
VIAL, NEBULIZER (ML) INHALATION AS NEEDED
COMMUNITY

## 2023-06-26 ASSESSMENT — PAIN DESCRIPTION - PAIN TYPE: TYPE: CHRONIC PAIN

## 2023-06-26 ASSESSMENT — PAIN DESCRIPTION - LOCATION: LOCATION: NECK;BACK

## 2023-06-26 ASSESSMENT — PAIN DESCRIPTION - FREQUENCY: FREQUENCY: CONTINUOUS

## 2023-06-26 ASSESSMENT — PAIN DESCRIPTION - ORIENTATION: ORIENTATION: UPPER;LOWER

## 2023-06-26 ASSESSMENT — PAIN SCALES - GENERAL: PAINLEVEL_OUTOF10: 7

## 2023-06-26 ASSESSMENT — PAIN DESCRIPTION - DESCRIPTORS: DESCRIPTORS: ACHING

## 2023-06-27 LAB
BACTERIA SPEC CULT: NORMAL
BACTERIA SPEC CULT: NORMAL
SERVICE CMNT-IMP: NORMAL

## 2023-06-27 RX ORDER — SCOLOPAMINE TRANSDERMAL SYSTEM 1 MG/1
1 PATCH, EXTENDED RELEASE TRANSDERMAL ONCE
Qty: 1 PATCH | Refills: 0 | Status: SHIPPED | OUTPATIENT
Start: 2023-06-27 | End: 2023-06-27

## 2023-07-03 ENCOUNTER — HOSPITAL ENCOUNTER (OUTPATIENT)
Facility: HOSPITAL | Age: 58
Discharge: HOME | End: 2023-07-04
Attending: ORTHOPAEDIC SURGERY | Admitting: ORTHOPAEDIC SURGERY
Payer: MEDICARE

## 2023-07-03 ENCOUNTER — APPOINTMENT (OUTPATIENT)
Facility: HOSPITAL | Age: 58
End: 2023-07-03
Attending: ORTHOPAEDIC SURGERY
Payer: MEDICARE

## 2023-07-03 ENCOUNTER — ANESTHESIA (OUTPATIENT)
Facility: HOSPITAL | Age: 58
End: 2023-07-03
Payer: MEDICARE

## 2023-07-03 ENCOUNTER — ANESTHESIA EVENT (OUTPATIENT)
Facility: HOSPITAL | Age: 58
End: 2023-07-03
Payer: MEDICARE

## 2023-07-03 DIAGNOSIS — Z98.1 S/P CERVICAL SPINAL FUSION: Primary | ICD-10-CM

## 2023-07-03 PROBLEM — M48.02 CERVICAL STENOSIS OF SPINAL CANAL: Status: ACTIVE | Noted: 2023-07-03

## 2023-07-03 LAB
GLUCOSE BLD STRIP.AUTO-MCNC: 96 MG/DL (ref 65–117)
SERVICE CMNT-IMP: NORMAL

## 2023-07-03 PROCEDURE — 6360000002 HC RX W HCPCS: Performed by: PHYSICIAN ASSISTANT

## 2023-07-03 PROCEDURE — 6370000000 HC RX 637 (ALT 250 FOR IP): Performed by: STUDENT IN AN ORGANIZED HEALTH CARE EDUCATION/TRAINING PROGRAM

## 2023-07-03 PROCEDURE — 82962 GLUCOSE BLOOD TEST: CPT

## 2023-07-03 PROCEDURE — 22845 INSERT SPINE FIXATION DEVICE: CPT | Performed by: ORTHOPAEDIC SURGERY

## 2023-07-03 PROCEDURE — 3700000000 HC ANESTHESIA ATTENDED CARE: Performed by: ORTHOPAEDIC SURGERY

## 2023-07-03 PROCEDURE — 6360000002 HC RX W HCPCS: Performed by: STUDENT IN AN ORGANIZED HEALTH CARE EDUCATION/TRAINING PROGRAM

## 2023-07-03 PROCEDURE — 2580000003 HC RX 258: Performed by: NURSE ANESTHETIST, CERTIFIED REGISTERED

## 2023-07-03 PROCEDURE — 2500000003 HC RX 250 WO HCPCS: Performed by: ORTHOPAEDIC SURGERY

## 2023-07-03 PROCEDURE — 2580000003 HC RX 258: Performed by: STUDENT IN AN ORGANIZED HEALTH CARE EDUCATION/TRAINING PROGRAM

## 2023-07-03 PROCEDURE — 3700000001 HC ADD 15 MINUTES (ANESTHESIA): Performed by: ORTHOPAEDIC SURGERY

## 2023-07-03 PROCEDURE — 3600000004 HC SURGERY LEVEL 4 BASE: Performed by: ORTHOPAEDIC SURGERY

## 2023-07-03 PROCEDURE — 2580000003 HC RX 258: Performed by: PHYSICIAN ASSISTANT

## 2023-07-03 PROCEDURE — 7100000000 HC PACU RECOVERY - FIRST 15 MIN: Performed by: ORTHOPAEDIC SURGERY

## 2023-07-03 PROCEDURE — 7100000001 HC PACU RECOVERY - ADDTL 15 MIN: Performed by: ORTHOPAEDIC SURGERY

## 2023-07-03 PROCEDURE — 2709999900 HC NON-CHARGEABLE SUPPLY: Performed by: ORTHOPAEDIC SURGERY

## 2023-07-03 PROCEDURE — 20931 SP BONE ALGRFT STRUCT ADD-ON: CPT | Performed by: STUDENT IN AN ORGANIZED HEALTH CARE EDUCATION/TRAINING PROGRAM

## 2023-07-03 PROCEDURE — 2500000003 HC RX 250 WO HCPCS: Performed by: NURSE ANESTHETIST, CERTIFIED REGISTERED

## 2023-07-03 PROCEDURE — 22551 ARTHRD ANT NTRBDY CERVICAL: CPT | Performed by: STUDENT IN AN ORGANIZED HEALTH CARE EDUCATION/TRAINING PROGRAM

## 2023-07-03 PROCEDURE — C1713 ANCHOR/SCREW BN/BN,TIS/BN: HCPCS | Performed by: ORTHOPAEDIC SURGERY

## 2023-07-03 PROCEDURE — 20931 SP BONE ALGRFT STRUCT ADD-ON: CPT | Performed by: ORTHOPAEDIC SURGERY

## 2023-07-03 PROCEDURE — 22551 ARTHRD ANT NTRBDY CERVICAL: CPT | Performed by: ORTHOPAEDIC SURGERY

## 2023-07-03 PROCEDURE — 3600000014 HC SURGERY LEVEL 4 ADDTL 15MIN: Performed by: ORTHOPAEDIC SURGERY

## 2023-07-03 PROCEDURE — 2720000010 HC SURG SUPPLY STERILE: Performed by: ORTHOPAEDIC SURGERY

## 2023-07-03 PROCEDURE — 22845 INSERT SPINE FIXATION DEVICE: CPT | Performed by: STUDENT IN AN ORGANIZED HEALTH CARE EDUCATION/TRAINING PROGRAM

## 2023-07-03 PROCEDURE — 6360000002 HC RX W HCPCS: Performed by: NURSE ANESTHETIST, CERTIFIED REGISTERED

## 2023-07-03 DEVICE — IMPLANTABLE DEVICE: Type: IMPLANTABLE DEVICE | Site: NECK | Status: FUNCTIONAL

## 2023-07-03 DEVICE — I-FACTOR PUTTY, 1.0CC SYRINGE
Type: IMPLANTABLE DEVICE | Site: NECK | Status: FUNCTIONAL
Brand: I-FACTOR PEPTIDE ENHANCED BONE GRAFT

## 2023-07-03 DEVICE — GRAFT HUM TISS 3X4 CM WND COVERING AMNIO MEMBRN VERSASHIELD: Type: IMPLANTABLE DEVICE | Site: NECK | Status: FUNCTIONAL

## 2023-07-03 DEVICE — SCREW 7713515 ZEVO VAR SD 3.5MM X 15MM
Type: IMPLANTABLE DEVICE | Site: NECK | Status: FUNCTIONAL
Brand: ZEVO™ ANTERIOR CERVICAL PLATE SYSTEM

## 2023-07-03 RX ORDER — SODIUM CHLORIDE 9 MG/ML
INJECTION, SOLUTION INTRAVENOUS PRN
Status: DISCONTINUED | OUTPATIENT
Start: 2023-07-03 | End: 2023-07-03 | Stop reason: HOSPADM

## 2023-07-03 RX ORDER — HYDRALAZINE HYDROCHLORIDE 20 MG/ML
10 INJECTION INTRAMUSCULAR; INTRAVENOUS
Status: DISCONTINUED | OUTPATIENT
Start: 2023-07-03 | End: 2023-07-03 | Stop reason: HOSPADM

## 2023-07-03 RX ORDER — SODIUM CHLORIDE, SODIUM LACTATE, POTASSIUM CHLORIDE, CALCIUM CHLORIDE 600; 310; 30; 20 MG/100ML; MG/100ML; MG/100ML; MG/100ML
INJECTION, SOLUTION INTRAVENOUS CONTINUOUS
Status: DISCONTINUED | OUTPATIENT
Start: 2023-07-03 | End: 2023-07-03 | Stop reason: HOSPADM

## 2023-07-03 RX ORDER — ALBUTEROL SULFATE 90 UG/1
2 AEROSOL, METERED RESPIRATORY (INHALATION) 2 TIMES DAILY PRN
Status: DISCONTINUED | OUTPATIENT
Start: 2023-07-03 | End: 2023-07-03

## 2023-07-03 RX ORDER — GUAIFENESIN 600 MG/1
1200 TABLET, EXTENDED RELEASE ORAL 2 TIMES DAILY
Status: DISCONTINUED | OUTPATIENT
Start: 2023-07-03 | End: 2023-07-04 | Stop reason: HOSPADM

## 2023-07-03 RX ORDER — FENTANYL CITRATE 50 UG/ML
25 INJECTION, SOLUTION INTRAMUSCULAR; INTRAVENOUS EVERY 5 MIN PRN
Status: COMPLETED | OUTPATIENT
Start: 2023-07-03 | End: 2023-07-03

## 2023-07-03 RX ORDER — SENNA AND DOCUSATE SODIUM 50; 8.6 MG/1; MG/1
1 TABLET, FILM COATED ORAL 2 TIMES DAILY
Status: DISCONTINUED | OUTPATIENT
Start: 2023-07-03 | End: 2023-07-04 | Stop reason: HOSPADM

## 2023-07-03 RX ORDER — PROPOFOL 10 MG/ML
INJECTION, EMULSION INTRAVENOUS PRN
Status: DISCONTINUED | OUTPATIENT
Start: 2023-07-03 | End: 2023-07-03 | Stop reason: SDUPTHER

## 2023-07-03 RX ORDER — DULOXETIN HYDROCHLORIDE 60 MG/1
60 CAPSULE, DELAYED RELEASE ORAL 2 TIMES DAILY
Status: DISCONTINUED | OUTPATIENT
Start: 2023-07-03 | End: 2023-07-04 | Stop reason: HOSPADM

## 2023-07-03 RX ORDER — ONDANSETRON 2 MG/ML
4 INJECTION INTRAMUSCULAR; INTRAVENOUS
Status: COMPLETED | OUTPATIENT
Start: 2023-07-03 | End: 2023-07-03

## 2023-07-03 RX ORDER — SODIUM CHLORIDE 0.9 % (FLUSH) 0.9 %
5-40 SYRINGE (ML) INJECTION PRN
Status: DISCONTINUED | OUTPATIENT
Start: 2023-07-03 | End: 2023-07-04 | Stop reason: HOSPADM

## 2023-07-03 RX ORDER — ONDANSETRON 2 MG/ML
INJECTION INTRAMUSCULAR; INTRAVENOUS PRN
Status: DISCONTINUED | OUTPATIENT
Start: 2023-07-03 | End: 2023-07-03 | Stop reason: SDUPTHER

## 2023-07-03 RX ORDER — LIDOCAINE HYDROCHLORIDE 10 MG/ML
1 INJECTION, SOLUTION EPIDURAL; INFILTRATION; INTRACAUDAL; PERINEURAL
Status: DISCONTINUED | OUTPATIENT
Start: 2023-07-03 | End: 2023-07-03 | Stop reason: HOSPADM

## 2023-07-03 RX ORDER — IPRATROPIUM BROMIDE AND ALBUTEROL SULFATE 2.5; .5 MG/3ML; MG/3ML
1 SOLUTION RESPIRATORY (INHALATION) 4 TIMES DAILY
Status: DISCONTINUED | OUTPATIENT
Start: 2023-07-03 | End: 2023-07-04 | Stop reason: HOSPADM

## 2023-07-03 RX ORDER — SODIUM CHLORIDE FOR INHALATION 3 %
4 VIAL, NEBULIZER (ML) INHALATION AS NEEDED
Status: DISCONTINUED | OUTPATIENT
Start: 2023-07-03 | End: 2023-07-04 | Stop reason: HOSPADM

## 2023-07-03 RX ORDER — ONDANSETRON 2 MG/ML
4 INJECTION INTRAMUSCULAR; INTRAVENOUS EVERY 6 HOURS PRN
Status: DISCONTINUED | OUTPATIENT
Start: 2023-07-03 | End: 2023-07-04 | Stop reason: HOSPADM

## 2023-07-03 RX ORDER — PROCHLORPERAZINE EDISYLATE 5 MG/ML
5 INJECTION INTRAMUSCULAR; INTRAVENOUS
Status: DISCONTINUED | OUTPATIENT
Start: 2023-07-03 | End: 2023-07-03 | Stop reason: HOSPADM

## 2023-07-03 RX ORDER — HYDROMORPHONE HYDROCHLORIDE 1 MG/ML
0.5 INJECTION, SOLUTION INTRAMUSCULAR; INTRAVENOUS; SUBCUTANEOUS EVERY 5 MIN PRN
Status: DISCONTINUED | OUTPATIENT
Start: 2023-07-03 | End: 2023-07-03 | Stop reason: HOSPADM

## 2023-07-03 RX ORDER — SODIUM CHLORIDE 9 MG/ML
INJECTION, SOLUTION INTRAVENOUS PRN
Status: DISCONTINUED | OUTPATIENT
Start: 2023-07-03 | End: 2023-07-04 | Stop reason: HOSPADM

## 2023-07-03 RX ORDER — OXYCODONE HYDROCHLORIDE 5 MG/1
5 TABLET ORAL EVERY 4 HOURS PRN
Status: DISCONTINUED | OUTPATIENT
Start: 2023-07-03 | End: 2023-07-04 | Stop reason: HOSPADM

## 2023-07-03 RX ORDER — SODIUM CHLORIDE 9 MG/ML
INJECTION, SOLUTION INTRAVENOUS CONTINUOUS
Status: DISCONTINUED | OUTPATIENT
Start: 2023-07-03 | End: 2023-07-04 | Stop reason: HOSPADM

## 2023-07-03 RX ORDER — CETIRIZINE HYDROCHLORIDE 10 MG/1
10 TABLET ORAL NIGHTLY
Status: DISCONTINUED | OUTPATIENT
Start: 2023-07-03 | End: 2023-07-04 | Stop reason: HOSPADM

## 2023-07-03 RX ORDER — MIDAZOLAM HYDROCHLORIDE 2 MG/2ML
2 INJECTION, SOLUTION INTRAMUSCULAR; INTRAVENOUS
Status: DISCONTINUED | OUTPATIENT
Start: 2023-07-03 | End: 2023-07-03 | Stop reason: HOSPADM

## 2023-07-03 RX ORDER — LIDOCAINE HYDROCHLORIDE 20 MG/ML
INJECTION, SOLUTION EPIDURAL; INFILTRATION; INTRACAUDAL; PERINEURAL PRN
Status: DISCONTINUED | OUTPATIENT
Start: 2023-07-03 | End: 2023-07-03 | Stop reason: SDUPTHER

## 2023-07-03 RX ORDER — SODIUM CHLORIDE 0.9 % (FLUSH) 0.9 %
5-40 SYRINGE (ML) INJECTION EVERY 12 HOURS SCHEDULED
Status: DISCONTINUED | OUTPATIENT
Start: 2023-07-03 | End: 2023-07-03 | Stop reason: HOSPADM

## 2023-07-03 RX ORDER — MIDAZOLAM HYDROCHLORIDE 1 MG/ML
INJECTION INTRAMUSCULAR; INTRAVENOUS PRN
Status: DISCONTINUED | OUTPATIENT
Start: 2023-07-03 | End: 2023-07-03 | Stop reason: SDUPTHER

## 2023-07-03 RX ORDER — SODIUM CHLORIDE 0.9 % (FLUSH) 0.9 %
5-40 SYRINGE (ML) INJECTION PRN
Status: DISCONTINUED | OUTPATIENT
Start: 2023-07-03 | End: 2023-07-03 | Stop reason: HOSPADM

## 2023-07-03 RX ORDER — CLONIDINE HYDROCHLORIDE 0.1 MG/1
0.1 TABLET ORAL 3 TIMES DAILY
Status: DISCONTINUED | OUTPATIENT
Start: 2023-07-03 | End: 2023-07-04 | Stop reason: HOSPADM

## 2023-07-03 RX ORDER — OXYCODONE HYDROCHLORIDE 5 MG/1
10 TABLET ORAL EVERY 4 HOURS PRN
Status: DISCONTINUED | OUTPATIENT
Start: 2023-07-03 | End: 2023-07-04 | Stop reason: HOSPADM

## 2023-07-03 RX ORDER — BUPROPION HYDROCHLORIDE 150 MG/1
150 TABLET, EXTENDED RELEASE ORAL EVERY MORNING
Status: DISCONTINUED | OUTPATIENT
Start: 2023-07-04 | End: 2023-07-04 | Stop reason: HOSPADM

## 2023-07-03 RX ORDER — UBIDECARENONE 100 MG
400 CAPSULE ORAL DAILY
Status: DISCONTINUED | OUTPATIENT
Start: 2023-07-03 | End: 2023-07-03

## 2023-07-03 RX ORDER — CYCLOBENZAPRINE HCL 10 MG
10 TABLET ORAL EVERY 12 HOURS PRN
Status: DISCONTINUED | OUTPATIENT
Start: 2023-07-03 | End: 2023-07-04 | Stop reason: HOSPADM

## 2023-07-03 RX ORDER — ONDANSETRON 4 MG/1
4 TABLET, ORALLY DISINTEGRATING ORAL EVERY 8 HOURS PRN
Status: DISCONTINUED | OUTPATIENT
Start: 2023-07-03 | End: 2023-07-04 | Stop reason: HOSPADM

## 2023-07-03 RX ORDER — HYDROMORPHONE HYDROCHLORIDE 1 MG/ML
0.5 INJECTION, SOLUTION INTRAMUSCULAR; INTRAVENOUS; SUBCUTANEOUS
Status: DISCONTINUED | OUTPATIENT
Start: 2023-07-03 | End: 2023-07-04 | Stop reason: HOSPADM

## 2023-07-03 RX ORDER — FENTANYL CITRATE 50 UG/ML
100 INJECTION, SOLUTION INTRAMUSCULAR; INTRAVENOUS
Status: DISCONTINUED | OUTPATIENT
Start: 2023-07-03 | End: 2023-07-03 | Stop reason: HOSPADM

## 2023-07-03 RX ORDER — FENTANYL CITRATE 50 UG/ML
INJECTION, SOLUTION INTRAMUSCULAR; INTRAVENOUS PRN
Status: DISCONTINUED | OUTPATIENT
Start: 2023-07-03 | End: 2023-07-03 | Stop reason: SDUPTHER

## 2023-07-03 RX ORDER — MONTELUKAST SODIUM 10 MG/1
10 TABLET ORAL NIGHTLY
Status: DISCONTINUED | OUTPATIENT
Start: 2023-07-03 | End: 2023-07-04 | Stop reason: HOSPADM

## 2023-07-03 RX ORDER — POLYETHYLENE GLYCOL 3350 17 G/17G
17 POWDER, FOR SOLUTION ORAL DAILY
Status: DISCONTINUED | OUTPATIENT
Start: 2023-07-03 | End: 2023-07-04 | Stop reason: HOSPADM

## 2023-07-03 RX ORDER — GABAPENTIN 300 MG/1
900 CAPSULE ORAL 3 TIMES DAILY
Status: DISCONTINUED | OUTPATIENT
Start: 2023-07-03 | End: 2023-07-04 | Stop reason: HOSPADM

## 2023-07-03 RX ORDER — ACETAMINOPHEN 500 MG
1000 TABLET ORAL ONCE
Status: COMPLETED | OUTPATIENT
Start: 2023-07-03 | End: 2023-07-03

## 2023-07-03 RX ORDER — FAMOTIDINE 20 MG/1
20 TABLET, FILM COATED ORAL 2 TIMES DAILY
Status: DISCONTINUED | OUTPATIENT
Start: 2023-07-03 | End: 2023-07-04 | Stop reason: HOSPADM

## 2023-07-03 RX ORDER — OXYCODONE HYDROCHLORIDE 5 MG/1
5 TABLET ORAL
Status: DISCONTINUED | OUTPATIENT
Start: 2023-07-03 | End: 2023-07-03 | Stop reason: HOSPADM

## 2023-07-03 RX ORDER — FERROUS SULFATE 325(65) MG
325 TABLET ORAL
Status: DISCONTINUED | OUTPATIENT
Start: 2023-07-04 | End: 2023-07-04 | Stop reason: HOSPADM

## 2023-07-03 RX ORDER — SODIUM CHLORIDE 0.9 % (FLUSH) 0.9 %
5-40 SYRINGE (ML) INJECTION EVERY 12 HOURS SCHEDULED
Status: DISCONTINUED | OUTPATIENT
Start: 2023-07-03 | End: 2023-07-04 | Stop reason: HOSPADM

## 2023-07-03 RX ORDER — DEXAMETHASONE SODIUM PHOSPHATE 4 MG/ML
INJECTION, SOLUTION INTRA-ARTICULAR; INTRALESIONAL; INTRAMUSCULAR; INTRAVENOUS; SOFT TISSUE PRN
Status: DISCONTINUED | OUTPATIENT
Start: 2023-07-03 | End: 2023-07-03 | Stop reason: SDUPTHER

## 2023-07-03 RX ORDER — ACETAMINOPHEN 325 MG/1
650 TABLET ORAL EVERY 6 HOURS
Status: DISCONTINUED | OUTPATIENT
Start: 2023-07-03 | End: 2023-07-04 | Stop reason: HOSPADM

## 2023-07-03 RX ORDER — CHOLECALCIFEROL (VITAMIN D3) 125 MCG
1000 CAPSULE ORAL DAILY
Status: DISCONTINUED | OUTPATIENT
Start: 2023-07-03 | End: 2023-07-04 | Stop reason: HOSPADM

## 2023-07-03 RX ADMIN — ONDANSETRON 4 MG: 2 INJECTION INTRAMUSCULAR; INTRAVENOUS at 12:20

## 2023-07-03 RX ADMIN — GABAPENTIN 900 MG: 300 CAPSULE ORAL at 16:59

## 2023-07-03 RX ADMIN — CYANOCOBALAMIN TAB 500 MCG 1000 MCG: 500 TAB at 16:59

## 2023-07-03 RX ADMIN — FENTANYL CITRATE 25 MCG: 50 INJECTION, SOLUTION INTRAMUSCULAR; INTRAVENOUS at 12:50

## 2023-07-03 RX ADMIN — FENTANYL CITRATE 25 MCG: 50 INJECTION, SOLUTION INTRAMUSCULAR; INTRAVENOUS at 12:45

## 2023-07-03 RX ADMIN — SENNOSIDES AND DOCUSATE SODIUM 1 TABLET: 8.6; 5 TABLET ORAL at 20:51

## 2023-07-03 RX ADMIN — ACETAMINOPHEN 1000 MG: 500 TABLET ORAL at 09:37

## 2023-07-03 RX ADMIN — DEXAMETHASONE SODIUM PHOSPHATE 8 MG: 4 INJECTION, SOLUTION INTRAMUSCULAR; INTRAVENOUS at 10:35

## 2023-07-03 RX ADMIN — PROPOFOL 200 MG: 10 INJECTION, EMULSION INTRAVENOUS at 10:28

## 2023-07-03 RX ADMIN — MONTELUKAST 10 MG: 10 TABLET, FILM COATED ORAL at 20:51

## 2023-07-03 RX ADMIN — ACETAMINOPHEN 650 MG: 325 TABLET ORAL at 16:59

## 2023-07-03 RX ADMIN — ONDANSETRON HYDROCHLORIDE 4 MG: 2 INJECTION, SOLUTION INTRAMUSCULAR; INTRAVENOUS at 10:35

## 2023-07-03 RX ADMIN — CLONIDINE HYDROCHLORIDE 0.1 MG: 0.1 TABLET ORAL at 20:51

## 2023-07-03 RX ADMIN — PROPOFOL 125 MCG/KG/MIN: 10 INJECTION, EMULSION INTRAVENOUS at 10:28

## 2023-07-03 RX ADMIN — LIDOCAINE HYDROCHLORIDE 100 MG: 20 INJECTION, SOLUTION EPIDURAL; INFILTRATION; INTRACAUDAL; PERINEURAL at 10:28

## 2023-07-03 RX ADMIN — GUAIFENESIN 1200 MG: 600 TABLET, EXTENDED RELEASE ORAL at 20:51

## 2023-07-03 RX ADMIN — WATER 2000 MG: 1 INJECTION INTRAMUSCULAR; INTRAVENOUS; SUBCUTANEOUS at 10:35

## 2023-07-03 RX ADMIN — CETIRIZINE HYDROCHLORIDE 10 MG: 10 TABLET, FILM COATED ORAL at 20:51

## 2023-07-03 RX ADMIN — FENTANYL CITRATE 25 MCG: 50 INJECTION, SOLUTION INTRAMUSCULAR; INTRAVENOUS at 13:00

## 2023-07-03 RX ADMIN — FENTANYL CITRATE 25 MCG: 50 INJECTION, SOLUTION INTRAMUSCULAR; INTRAVENOUS at 12:35

## 2023-07-03 RX ADMIN — HYDROMORPHONE HYDROCHLORIDE 0.5 MG: 1 INJECTION, SOLUTION INTRAMUSCULAR; INTRAVENOUS; SUBCUTANEOUS at 20:51

## 2023-07-03 RX ADMIN — REMIFENTANIL HYDROCHLORIDE 0.2 MCG/KG/MIN: 1 INJECTION, POWDER, LYOPHILIZED, FOR SOLUTION INTRAVENOUS at 10:28

## 2023-07-03 RX ADMIN — SODIUM CHLORIDE, PRESERVATIVE FREE 10 ML: 5 INJECTION INTRAVENOUS at 20:00

## 2023-07-03 RX ADMIN — GABAPENTIN 900 MG: 300 CAPSULE ORAL at 20:51

## 2023-07-03 RX ADMIN — CLONIDINE HYDROCHLORIDE 0.1 MG: 0.1 TABLET ORAL at 16:59

## 2023-07-03 RX ADMIN — FENTANYL CITRATE 50 MCG: 0.05 INJECTION, SOLUTION INTRAMUSCULAR; INTRAVENOUS at 11:13

## 2023-07-03 RX ADMIN — SODIUM CHLORIDE, POTASSIUM CHLORIDE, SODIUM LACTATE AND CALCIUM CHLORIDE: 600; 310; 30; 20 INJECTION, SOLUTION INTRAVENOUS at 09:20

## 2023-07-03 RX ADMIN — FENTANYL CITRATE 100 MCG: 0.05 INJECTION, SOLUTION INTRAMUSCULAR; INTRAVENOUS at 10:28

## 2023-07-03 RX ADMIN — DULOXETINE HYDROCHLORIDE 60 MG: 60 CAPSULE, DELAYED RELEASE ORAL at 20:51

## 2023-07-03 RX ADMIN — SODIUM CHLORIDE: 9 INJECTION, SOLUTION INTRAVENOUS at 13:28

## 2023-07-03 RX ADMIN — POLYETHYLENE GLYCOL 3350 17 G: 17 POWDER, FOR SOLUTION ORAL at 16:59

## 2023-07-03 RX ADMIN — WATER 2000 MG: 1 INJECTION INTRAMUSCULAR; INTRAVENOUS; SUBCUTANEOUS at 19:29

## 2023-07-03 RX ADMIN — HYDROMORPHONE HYDROCHLORIDE 0.5 MG: 1 INJECTION, SOLUTION INTRAMUSCULAR; INTRAVENOUS; SUBCUTANEOUS at 12:20

## 2023-07-03 RX ADMIN — FAMOTIDINE 20 MG: 20 TABLET ORAL at 20:51

## 2023-07-03 RX ADMIN — HYDROMORPHONE HYDROCHLORIDE 0.5 MG: 1 INJECTION, SOLUTION INTRAMUSCULAR; INTRAVENOUS; SUBCUTANEOUS at 16:59

## 2023-07-03 RX ADMIN — PHENYLEPHRINE HYDROCHLORIDE 60 MCG/MIN: 10 INJECTION INTRAVENOUS at 10:28

## 2023-07-03 RX ADMIN — MIDAZOLAM HYDROCHLORIDE 3 MG: 1 INJECTION, SOLUTION INTRAMUSCULAR; INTRAVENOUS at 10:20

## 2023-07-03 ASSESSMENT — PAIN - FUNCTIONAL ASSESSMENT: PAIN_FUNCTIONAL_ASSESSMENT: 0-10

## 2023-07-03 ASSESSMENT — PAIN DESCRIPTION - LOCATION
LOCATION: NECK
LOCATION: NECK;BACK;HEAD

## 2023-07-03 ASSESSMENT — PAIN DESCRIPTION - DESCRIPTORS
DESCRIPTORS: SORE
DESCRIPTORS: ACHING
DESCRIPTORS: SORE
DESCRIPTORS: ACHING
DESCRIPTORS: ACHING

## 2023-07-03 ASSESSMENT — PAIN SCALES - GENERAL
PAINLEVEL_OUTOF10: 3
PAINLEVEL_OUTOF10: 4
PAINLEVEL_OUTOF10: 6
PAINLEVEL_OUTOF10: 3
PAINLEVEL_OUTOF10: 8
PAINLEVEL_OUTOF10: 8
PAINLEVEL_OUTOF10: 0
PAINLEVEL_OUTOF10: 5
PAINLEVEL_OUTOF10: 9
PAINLEVEL_OUTOF10: 4
PAINLEVEL_OUTOF10: 5
PAINLEVEL_OUTOF10: 6
PAINLEVEL_OUTOF10: 5

## 2023-07-03 ASSESSMENT — PAIN DESCRIPTION - ORIENTATION: ORIENTATION: LOWER

## 2023-07-03 NOTE — ANESTHESIA POSTPROCEDURE EVALUATION
Department of Anesthesiology  Postprocedure Note    Patient: Patricia Messer  MRN: [id number]  YOB: 1965  Date of evaluation: 7/3/2023      Procedure Summary     Date: 07/03/23 Room / Location: [facility] MAIN OR [address] [place] [address] MAIN OR    Anesthesia Start: 1020 Anesthesia Stop: 1207    Procedure: C4-5 REVISION ANTERIOR CERVICAL DISCECTOMY AND FUSION (Neck) Diagnosis:       Disc disorder of cervical region      Status post arthrodesis      (Disc disorder of cervical region [M50.90])      (Status post arthrodesis [Z98.1])    Providers: Toni Hartman MD Responsible Provider: Ceci Sidhu MD    Anesthesia Type: General, TIVA ASA Status: 3          Anesthesia Type: General, TIVA    Сергей Phase I: Сергей Score: 8    Сергей Phase II:        Anesthesia Post Evaluation    Patient location during evaluation: PACU  Level of consciousness: awake  Airway patency: patent  Nausea & Vomiting: no nausea  Complications: no  Cardiovascular status: blood pressure returned to baseline  Respiratory status: acceptable  Hydration status: euvolemic  Comments: --------------------            07/03/23               1328     --------------------   BP:                  Pulse:      86       Resp:       12       Temp:                SpO2:      92%      --------------------

## 2023-07-03 NOTE — BRIEF OP NOTE
Brief Postoperative Note      Patient: Maritza Guerra  YOB: 1965  MRN: [id number]    Date of Procedure: 7/3/2023    Pre-Op Diagnosis Codes:     * Disc disorder of cervical region [M50.90]     * Status post arthrodesis [Z98.1]    Post-Op Diagnosis: Same       Procedure(s):  C4-5 REVISION ANTERIOR CERVICAL DISCECTOMY AND FUSION    Surgeon(s):  Martha Joseph MD    Assistant:  Physician Assistant: RONNIE Weaver    Anesthesia: General    Estimated Blood Loss (mL): Minimal    Complications: None    Specimens:   * No specimens in log *    Implants:  Implant Name Type Inv. Item Serial No.  Lot No. LRB No. Used Action   GRAFT HUM TISS 3X4 CM WND COVERING AMNIO MEMBRN VERSASHIELD - [id number]  GRAFT HUM TISS 3X4 CM WND COVERING AMNIO [employer]BRN VERSASHIELD [id number] [employer] N/A N/A 1 Implanted   ALLOQUENT-S CERV 5 DEG 7MM   [id number]  N/A N/A 1 Implanted   IFACTOR BONE GRAFT   N/A  [id number] N/A 1 Implanted   PLATE SPNL F60DS LEV 2 ANT CERV TI ZEVO - SN/A  PLATE SPNL G15SY LEV 2 ANT CERV TI ZEVO N/A [employer]-WD N/A N/A 1 Implanted   SCREW SPNL L15MM DIA3. 5MM ANT CERV TI SELF DRL XAVI ANG - SN/A  SCREW SPNL L15MM DIA3. 5MM ANT CERV TI SELF DRL XAVI ANG N/A [employer] SOFAM[employer] DANEK-WD N/A N/A 4 Implanted   SCREW SPNL L15MM DIA4MM ANT CERV TI SELF DRL XAVI ANG - SN/A  SCREW SPNL L15MM DIA4MM ANT CERV TI SELF DRL XAVI ANG N/A [employer]K-WD N/A N/A 2 Implanted         Drains: * No LDAs found *    Findings: Stenosis       Electronically signed by RONNIE Weaver on 7/3/2023 at 11:50 AM

## 2023-07-03 NOTE — OP NOTE
[facility]  OPERATIVE REPORT    Name:  Bridger Abreu  MR#:  [id number]  :  1965  ACCOUNT #:  [de-identified]  DATE OF SERVICE:  2023    PREOPERATIVE DIAGNOSES:  Status post anterior cervical discectomy and fusion C5-6, cervical spondylosis C4-5, cervical stenosis C4-5. POSTOPERATIVE DIAGNOSES:  Status post anterior cervical discectomy and fusion C5-6, cervical spondylosis C4-5, cervical stenosis C4-5. PROCEDURE PERFORMED:  Anterior cervical diskectomy C4-5, anterior structural allograft C4-5, anterior plate fixation D0-O7. SURGEON:  Nusrat Del Rio MD    ASSISTANT:  Jenelle Rivas PA-C    ANESTHESIA:  General anesthesia. COMPLICATIONS:  No complications. SPECIMENS REMOVED:  None    IMPLANTS:  Instrumentation includes Medtronic Zevo anterior cervical plate at 37 mm and Orthofix AlloQuent allograft. ESTIMATED BLOOD LOSS:  Minimal    DRAINS:  No drains. INDICATIONS:  This is a pleasant 59-year female status post previous ACDF C5-6 several years ago, now with C4-5 spondylosis and stenosis centrally as well as bilaterally. The patient for decompression and revision fusion. The patient understood the risks and benefits, elected to proceed. PROCEDURE:  The patient was identified, brought to the operative suite, underwent general anesthesia without difficulty. Preoperative neuromonitoring was placed and baselines obtained, and these remained stable throughout the surgical procedure. Perioperative antibiotics given to the patient prior to surgical incision. The patient placed in supine position. Ten pounds of traction across the head with a chin strap. Neck was prepped and draped sterilely. Time-out was confirmed. Transverse incision was made using the previous surgical incision approximately at the cricoid cartilage. Dissection down to the platysma. Blunt dissection medial sternocleidomastoid down to deep cervical fascia.   At which time, we then identified

## 2023-07-03 NOTE — FLOWSHEET NOTE
07/03/23 1103   Family Communication    Relationship to Patient Other relative    Phone Number John Islas [phone]   Family/Significant Other Update Called   Delivery Origin Nurse   Message Disposition Family present - message delivered   Update Given Yes   Family Communication   Family Update Message Procedure started

## 2023-07-03 NOTE — ANESTHESIA PRE PROCEDURE
PM       HCG (If Applicable): No results found for: PREGTESTUR, PREGSERUM, HCG, HCGQUANT     ABGs: No results found for: PHART, PO2ART, UBC1BKQ, RBG6OFP, BEART, F9FGDYYX     Type & Screen (If Applicable):  No results found for: LABABO, LABRH    Drug/Infectious Status (If Applicable):  No results found for: HIV, HEPCAB    COVID-19 Screening (If Applicable): No results found for: COVID19        Anesthesia Evaluation  Patient summary reviewed and Nursing notes reviewed   history of anesthetic complications: PONV. Airway: Mallampati: II  TM distance: >3 FB   Neck ROM: full  Mouth opening: > = 3 FB   Dental: normal exam         Pulmonary:Negative Pulmonary ROS breath sounds clear to auscultation  (+) sleep apnea:  asthma:                           ROS comment: Sarcodosis  Tracheobronchomalacia   Cardiovascular:Negative CV ROS    (+) hypertension:,         Rhythm: regular                      Neuro/Psych:   Negative Neuro/Psych ROS  (+) neuromuscular disease:,              ROS comment: Muscular dystrophy GI/Hepatic/Renal: Neg GI/Hepatic/Renal ROS  (+) GERD:, renal disease: CRI, morbid obesity          Endo/Other: Negative Endo/Other ROS                    Abdominal:              PE comment: Deferred   Vascular: negative vascular ROS. Other Findings:           Anesthesia Plan      general and TIVA     ASA 3       Induction: intravenous. MIPS: Postoperative opioids intended and Prophylactic antiemetics administered. Anesthetic plan and risks discussed with patient. Plan discussed with CRNA.                     Layo Dave MD   7/3/2023

## 2023-07-04 ENCOUNTER — APPOINTMENT (OUTPATIENT)
Facility: HOSPITAL | Age: 58
End: 2023-07-04
Attending: ORTHOPAEDIC SURGERY
Payer: MEDICARE

## 2023-07-04 VITALS
BODY MASS INDEX: 36.86 KG/M2 | RESPIRATION RATE: 19 BRPM | HEIGHT: 63 IN | OXYGEN SATURATION: 90 % | DIASTOLIC BLOOD PRESSURE: 85 MMHG | SYSTOLIC BLOOD PRESSURE: 119 MMHG | HEART RATE: 91 BPM | WEIGHT: 208 LBS | TEMPERATURE: 97.9 F

## 2023-07-04 LAB
EKG ATRIAL RATE: 79 BPM
EKG DIAGNOSIS: NORMAL
EKG P AXIS: 51 DEGREES
EKG P-R INTERVAL: 150 MS
EKG Q-T INTERVAL: 392 MS
EKG QRS DURATION: 94 MS
EKG QTC CALCULATION (BAZETT): 449 MS
EKG R AXIS: 30 DEGREES
EKG T AXIS: 38 DEGREES
EKG VENTRICULAR RATE: 79 BPM
TROPONIN I SERPL HS-MCNC: <3 NG/L (ref 0–37)

## 2023-07-04 PROCEDURE — 97161 PT EVAL LOW COMPLEX 20 MIN: CPT

## 2023-07-04 PROCEDURE — 99024 POSTOP FOLLOW-UP VISIT: CPT | Performed by: PHYSICIAN ASSISTANT

## 2023-07-04 PROCEDURE — 6360000002 HC RX W HCPCS: Performed by: STUDENT IN AN ORGANIZED HEALTH CARE EDUCATION/TRAINING PROGRAM

## 2023-07-04 PROCEDURE — 97165 OT EVAL LOW COMPLEX 30 MIN: CPT

## 2023-07-04 PROCEDURE — 71045 X-RAY EXAM CHEST 1 VIEW: CPT

## 2023-07-04 PROCEDURE — 97116 GAIT TRAINING THERAPY: CPT

## 2023-07-04 PROCEDURE — 6370000000 HC RX 637 (ALT 250 FOR IP): Performed by: STUDENT IN AN ORGANIZED HEALTH CARE EDUCATION/TRAINING PROGRAM

## 2023-07-04 PROCEDURE — 94640 AIRWAY INHALATION TREATMENT: CPT

## 2023-07-04 PROCEDURE — 2700000000 HC OXYGEN THERAPY PER DAY

## 2023-07-04 PROCEDURE — 36415 COLL VENOUS BLD VENIPUNCTURE: CPT

## 2023-07-04 PROCEDURE — 84484 ASSAY OF TROPONIN QUANT: CPT

## 2023-07-04 PROCEDURE — 97535 SELF CARE MNGMENT TRAINING: CPT

## 2023-07-04 PROCEDURE — 6360000002 HC RX W HCPCS: Performed by: ORTHOPAEDIC SURGERY

## 2023-07-04 PROCEDURE — 2580000003 HC RX 258: Performed by: STUDENT IN AN ORGANIZED HEALTH CARE EDUCATION/TRAINING PROGRAM

## 2023-07-04 PROCEDURE — 97530 THERAPEUTIC ACTIVITIES: CPT

## 2023-07-04 RX ORDER — HYDROMORPHONE HYDROCHLORIDE 2 MG/1
2-4 TABLET ORAL EVERY 4 HOURS PRN
Qty: 30 TABLET | Refills: 0 | Status: SHIPPED | OUTPATIENT
Start: 2023-07-04 | End: 2023-07-18

## 2023-07-04 RX ADMIN — IPRATROPIUM BROMIDE AND ALBUTEROL SULFATE 1 DOSE: 2.5; .5 SOLUTION RESPIRATORY (INHALATION) at 11:45

## 2023-07-04 RX ADMIN — GABAPENTIN 900 MG: 300 CAPSULE ORAL at 09:20

## 2023-07-04 RX ADMIN — GUAIFENESIN 1200 MG: 600 TABLET, EXTENDED RELEASE ORAL at 09:19

## 2023-07-04 RX ADMIN — FERROUS SULFATE TAB 325 MG (65 MG ELEMENTAL FE) 325 MG: 325 (65 FE) TAB at 06:51

## 2023-07-04 RX ADMIN — ARFORMOTEROL TARTRATE: 15 SOLUTION RESPIRATORY (INHALATION) at 07:21

## 2023-07-04 RX ADMIN — DULOXETINE HYDROCHLORIDE 60 MG: 60 CAPSULE, DELAYED RELEASE ORAL at 09:19

## 2023-07-04 RX ADMIN — Medication 1 TABLET: at 06:51

## 2023-07-04 RX ADMIN — BUPROPION HYDROCHLORIDE 150 MG: 150 TABLET, EXTENDED RELEASE ORAL at 09:20

## 2023-07-04 RX ADMIN — OXYCODONE HYDROCHLORIDE 10 MG: 5 TABLET ORAL at 09:19

## 2023-07-04 RX ADMIN — SODIUM CHLORIDE, PRESERVATIVE FREE 10 ML: 5 INJECTION INTRAVENOUS at 09:24

## 2023-07-04 RX ADMIN — POLYETHYLENE GLYCOL 3350 17 G: 17 POWDER, FOR SOLUTION ORAL at 09:20

## 2023-07-04 RX ADMIN — CYANOCOBALAMIN TAB 500 MCG 1000 MCG: 500 TAB at 09:20

## 2023-07-04 RX ADMIN — WATER 2000 MG: 1 INJECTION INTRAMUSCULAR; INTRAVENOUS; SUBCUTANEOUS at 03:41

## 2023-07-04 RX ADMIN — SENNOSIDES AND DOCUSATE SODIUM 1 TABLET: 8.6; 5 TABLET ORAL at 09:20

## 2023-07-04 RX ADMIN — IPRATROPIUM BROMIDE AND ALBUTEROL SULFATE 1 DOSE: 2.5; .5 SOLUTION RESPIRATORY (INHALATION) at 07:21

## 2023-07-04 RX ADMIN — CLONIDINE HYDROCHLORIDE 0.1 MG: 0.1 TABLET ORAL at 13:45

## 2023-07-04 RX ADMIN — CLONIDINE HYDROCHLORIDE 0.1 MG: 0.1 TABLET ORAL at 09:19

## 2023-07-04 RX ADMIN — CYCLOBENZAPRINE 10 MG: 10 TABLET, FILM COATED ORAL at 11:37

## 2023-07-04 RX ADMIN — GABAPENTIN 900 MG: 300 CAPSULE ORAL at 13:45

## 2023-07-04 RX ADMIN — ACETAMINOPHEN 650 MG: 325 TABLET ORAL at 09:20

## 2023-07-04 RX ADMIN — OXYCODONE HYDROCHLORIDE 10 MG: 5 TABLET ORAL at 13:48

## 2023-07-04 ASSESSMENT — PAIN DESCRIPTION - LOCATION
LOCATION: NECK
LOCATION: BACK;NECK

## 2023-07-04 ASSESSMENT — PAIN SCALES - GENERAL
PAINLEVEL_OUTOF10: 9
PAINLEVEL_OUTOF10: 8

## 2023-07-04 ASSESSMENT — PAIN DESCRIPTION - ORIENTATION
ORIENTATION: ANTERIOR
ORIENTATION: ANTERIOR

## 2023-07-04 ASSESSMENT — PAIN DESCRIPTION - DESCRIPTORS
DESCRIPTORS: PRESSURE
DESCRIPTORS: THROBBING

## 2023-07-04 NOTE — PLAN OF CARE
Problem: Pain  Goal: Verbalizes/displays adequate comfort level or baseline comfort level  7/4/2023 1526 by Carmen Mace RN  Outcome: Adequate for Discharge  7/4/2023 1110 by Carmen Mace RN  Outcome: Progressing     Problem: Safety - Adult  Goal: Free from fall injury  7/4/2023 1526 by Carmen Mace RN  Outcome: Adequate for Discharge  7/4/2023 1110 by Carmen Mace RN  Outcome: Progressing     Problem: Discharge Planning  Goal: Discharge to home or other facility with appropriate resources  Outcome: Adequate for Discharge

## 2023-07-04 NOTE — CARE COORDINATION
Transition of Care: Plan for discharge home with spouse. Patient owns cane, walker and wheelchair to use if needed. No home health needs at this time. Family will transport patient home.     CAMILO GOOD

## 2023-07-04 NOTE — DISCHARGE INSTRUCTIONS
medicine without talking to your healthcare provider   -Do not break, chew, crush, dissolve, or inject your medicine. If you cannot  swallow your medicine whole, talk to your healthcare provider.  -Do not drink alcohol while taking this medicine  -Do not take anti-inflammatory medications or aspirin unless instructed by your     Physician. Diet  -resume usual diet; drink plenty of fluids; eat foods high in fiber  -It is important to have regular bowel movements. Pain medications may cause constipation. You may want to take a stool softener (such as Senokot-S or Colace) to prevent constipation. If constipation occurs, take a laxative (such as Dulcolax tablets, Milk of Magnesia, or a suppository). Laxatives should only be used if the above preventable measures have failed and you still have not had a bowel movement after three days.

## 2023-07-04 NOTE — PLAN OF CARE
Problem: Pain  Goal: Verbalizes/displays adequate comfort level or baseline comfort level  7/4/2023 1110 by Carmen Mace RN  Outcome: Progressing  7/3/2023 2244 by Monica Weeks RN  Outcome: Progressing     Problem: Safety - Adult  Goal: Free from fall injury  7/4/2023 1110 by Carmen Mace RN  Outcome: Progressing  7/3/2023 2244 by Monica Weeks RN  Outcome: Progressing

## 2023-07-04 NOTE — PROGRESS NOTES
DR. Marybeth Tolbert EXPLANTED 1 PLATE AND 4 SCREWS FROM THE PATIENT'S NECK
Discharge instructions as well as spine precautions reviewed with patient and family member. All questions were answered. Patient taken downstairs via wheelchair to discharge location.
PT Orders acknowledged, chart reviewed.    Pt noted to be POD #0 for C4-5 REVISION ANTERIOR CERVICAL DISCECTOMY AND FUSION, will continue to follow and evaluate tomorrow per protocol    Vandana Casillas DPT, PT
at home. Discussed discharge meds with Dr. Caryn Ellis. Per Dr. Caryn Ellis, will send pt home on her normal medications as well as something PRN. Pt has been on PRN dilaudid in the past and tolerated it well.     Activity: up with assist  DVT ppx: SCDs  Dispo: home today pending CP workup    Plan d/w Dr. Shiloh Alcantara and pharmacy    RONNIE Mark
dependent     Tuan Schroeder, Barthel, D.W. ([id number]). Functional evaluation: the Barthel Index. [address] [facility] (). The Barthel activities of daily living index: self-reporting versus actual performance in the old (> or = 75 years). Journal of [address] 45(7), [address], DAVID, Lanre Zabala., Marlo Jerry. (1999). Measuring the change in disability after inpatient rehabilitation; comparison of the responsiveness of the Barthel Index and Functional Gem Measure. Journal of Neurology, Neurosurgery, and Psychiatry, 66(4), 599-255. Joel Newsome, N.J.A, KIKO Silverman, & Khadijah Bolton MJose LuisA. (2004) Assessment of post-stroke quality of life in cost-effectiveness studies: The usefulness of the Barthel Index and the EuroQoL-5D. Quality of Life Research, 13, 427-43                                                                                                                                                                                                                                 Pain Ratin/10   Pain Intervention(s):   patient medicated for pain prior to session    Activity Tolerance:   Good    After treatment:   Patient left in no apparent distress sitting up in chair, Call bell within reach, Caregiver / family present, and nurse notified. COMMUNICATION/EDUCATION:   The patient's plan of care was discussed with: occupational therapist, registered nurse, and physician    Patient Education  Education Given To: Patient  Education Provided: Role of Therapy; Energy Conservation; Fall Prevention Strategies; Plan of Care;Transfer Training  Education Method: Demonstration;Verbal;Teach Back  Barriers to Learning: None  Education Outcome: Verbalized understanding;Demonstrated understanding    Thank you for this referral.  Herbie Galloway, PT  Minutes: 25      Physical Therapy Evaluation Charge Determination   History Examination
studies: The usefulness of the Barthel Index and the EuroQoL-5D. Quality of Life Research, 13, 427-49                                                                                                                                                                                                                                 Pain Ratin/10   Pain Intervention(s):   nursing notified, rest, and repositioning    Activity Tolerance:   Good    After treatment:   Patient left in no apparent distress sitting up in chair, Call bell within reach, and Side rails x3    COMMUNICATION/EDUCATION:   The patient's plan of care was discussed with: physical therapist and registered nurse    Patient Education  Education Given To: Patient  Education Provided: Role of Therapy; Energy Conservation;Plan of Care;Precautions; ADL Adaptive Strategies;Transfer Training;Home Exercise Program  Education Method: Demonstration  Barriers to Learning: None  Education Outcome: Verbalized understanding    Thank you for this referral.  Liudmila Thomas OT  Minutes: 23    Occupational Therapy Evaluation Charge Determination   History Examination Decision-Making   LOW Complexity : Brief history review  LOW Complexity: 1-3 Performance deficits relating to physical, cognitive, or psychosocial skills that result in activity limitations and/or participation restrictions LOW Complexity: No comorbidities that affect functional and  no verbal  or physical assist needed to complete eval tasks   Based on the above components, the patient evaluation is determined to be of the following complexity level: Low

## 2023-07-12 PROBLEM — Z98.1 S/P CERVICAL SPINAL FUSION: Status: ACTIVE | Noted: 2023-07-12

## 2023-11-03 ENCOUNTER — HOSPITAL ENCOUNTER (OUTPATIENT)
Facility: HOSPITAL | Age: 58
End: 2023-11-03
Payer: MEDICARE

## 2023-11-03 VITALS
HEIGHT: 63 IN | WEIGHT: 187.39 LBS | HEART RATE: 78 BPM | OXYGEN SATURATION: 100 % | DIASTOLIC BLOOD PRESSURE: 86 MMHG | TEMPERATURE: 98 F | SYSTOLIC BLOOD PRESSURE: 128 MMHG | RESPIRATION RATE: 18 BRPM | BODY MASS INDEX: 33.2 KG/M2

## 2023-11-03 LAB
ABO + RH BLD: NORMAL
ANION GAP SERPL CALC-SCNC: 6 MMOL/L (ref 5–15)
APPEARANCE UR: CLEAR
BACTERIA URNS QL MICRO: NEGATIVE /HPF
BILIRUB UR QL: NEGATIVE
BLOOD GROUP ANTIBODIES SERPL: NORMAL
BUN SERPL-MCNC: 10 MG/DL (ref 6–20)
BUN/CREAT SERPL: 11 (ref 12–20)
CALCIUM SERPL-MCNC: 9.4 MG/DL (ref 8.5–10.1)
CAOX CRY URNS QL MICRO: ABNORMAL
CHLORIDE SERPL-SCNC: 104 MMOL/L (ref 97–108)
CO2 SERPL-SCNC: 25 MMOL/L (ref 21–32)
COLOR UR: ABNORMAL
CREAT SERPL-MCNC: 0.95 MG/DL (ref 0.55–1.02)
EPITH CASTS URNS QL MICRO: ABNORMAL /LPF
ERYTHROCYTE [DISTWIDTH] IN BLOOD BY AUTOMATED COUNT: 13.6 % (ref 11.5–14.5)
EST. AVERAGE GLUCOSE BLD GHB EST-MCNC: 97 MG/DL
GLUCOSE SERPL-MCNC: 90 MG/DL (ref 65–100)
GLUCOSE UR STRIP.AUTO-MCNC: NEGATIVE MG/DL
HBA1C MFR BLD: 5 % (ref 4–5.6)
HCT VFR BLD AUTO: 46.6 % (ref 35–47)
HGB BLD-MCNC: 15.3 G/DL (ref 11.5–16)
HGB UR QL STRIP: NEGATIVE
INR PPP: 1 (ref 0.9–1.1)
KETONES UR QL STRIP.AUTO: ABNORMAL MG/DL
LEUKOCYTE ESTERASE UR QL STRIP.AUTO: NEGATIVE
MCH RBC QN AUTO: 30.9 PG (ref 26–34)
MCHC RBC AUTO-ENTMCNC: 32.8 G/DL (ref 30–36.5)
MCV RBC AUTO: 94.1 FL (ref 80–99)
NITRITE UR QL STRIP.AUTO: NEGATIVE
NRBC # BLD: 0 K/UL (ref 0–0.01)
NRBC BLD-RTO: 0 PER 100 WBC
PH UR STRIP: 5.5 (ref 5–8)
PLATELET # BLD AUTO: 410 K/UL (ref 150–400)
PMV BLD AUTO: 10.6 FL (ref 8.9–12.9)
POTASSIUM SERPL-SCNC: 4.4 MMOL/L (ref 3.5–5.1)
PROT UR STRIP-MCNC: 30 MG/DL
PROTHROMBIN TIME: 10.5 SEC (ref 9–11.1)
RBC # BLD AUTO: 4.95 M/UL (ref 3.8–5.2)
RBC #/AREA URNS HPF: ABNORMAL /HPF (ref 0–5)
SODIUM SERPL-SCNC: 135 MMOL/L (ref 136–145)
SP GR UR REFRACTOMETRY: 1.03 (ref 1–1.03)
SPECIMEN EXP DATE BLD: NORMAL
URINE CULTURE IF INDICATED: ABNORMAL
UROBILINOGEN UR QL STRIP.AUTO: 1 EU/DL (ref 0.2–1)
WBC # BLD AUTO: 6.6 K/UL (ref 3.6–11)
WBC URNS QL MICRO: ABNORMAL /HPF (ref 0–4)

## 2023-11-03 PROCEDURE — 86900 BLOOD TYPING SEROLOGIC ABO: CPT

## 2023-11-03 PROCEDURE — 36415 COLL VENOUS BLD VENIPUNCTURE: CPT

## 2023-11-03 PROCEDURE — 80048 BASIC METABOLIC PNL TOTAL CA: CPT

## 2023-11-03 PROCEDURE — 85027 COMPLETE CBC AUTOMATED: CPT

## 2023-11-03 PROCEDURE — 83036 HEMOGLOBIN GLYCOSYLATED A1C: CPT

## 2023-11-03 PROCEDURE — 86850 RBC ANTIBODY SCREEN: CPT

## 2023-11-03 PROCEDURE — 86901 BLOOD TYPING SEROLOGIC RH(D): CPT

## 2023-11-03 PROCEDURE — 85610 PROTHROMBIN TIME: CPT

## 2023-11-03 PROCEDURE — 81001 URINALYSIS AUTO W/SCOPE: CPT

## 2023-11-03 RX ORDER — TEZEPELUMAB-EKKO 210 MG/1.9ML
210 INJECTION, SOLUTION SUBCUTANEOUS
COMMUNITY

## 2023-11-03 RX ORDER — SEMAGLUTIDE 1.7 MG/.75ML
1.7 INJECTION, SOLUTION SUBCUTANEOUS
COMMUNITY

## 2023-11-03 ASSESSMENT — PAIN - FUNCTIONAL ASSESSMENT: PAIN_FUNCTIONAL_ASSESSMENT: PREVENTS OR INTERFERES SOME ACTIVE ACTIVITIES AND ADLS

## 2023-11-03 ASSESSMENT — PAIN DESCRIPTION - DESCRIPTORS: DESCRIPTORS: ACHING

## 2023-11-03 ASSESSMENT — PAIN SCALES - GENERAL: PAINLEVEL_OUTOF10: 5

## 2023-11-03 ASSESSMENT — PAIN DESCRIPTION - PAIN TYPE: TYPE: CHRONIC PAIN

## 2023-11-03 ASSESSMENT — PAIN DESCRIPTION - LOCATION: LOCATION: BACK

## 2023-11-03 NOTE — PERIOP NOTE
Ortho pre op and medication instructions printed and reviewed with patient. Two bottles of chg soap given. Surgical site infection sheet given. Opportunity for questions given.     Received pulmonary notes and placed on chart

## 2023-11-03 NOTE — PERIOP NOTE
Take 1 tablet by mouth daily    DULoxetine (CYMBALTA) 60 MG extended release capsule 1 capsule 2 times daily    fluticasone-umeclidin-vilant (TRELEGY ELLIPTA) 200-62.5-25 MCG/ACT AEPB inhaler Inhale 1 puff into the lungs daily    gabapentin (NEURONTIN) 300 MG capsule Take 3 capsules by mouth 3 times daily. 1 CAP MORNING  1 CAP EVENING  3 CAPS BEDTIME    guaiFENesin (MUCINEX) 600 MG extended release tablet Take 2 tablets by mouth 2 times daily    montelukast (SINGULAIR) 10 MG tablet Take 1 tablet by mouth nightly    albuterol sulfate HFA (PROVENTIL;VENTOLIN;PROAIR) 108 (90 Base) MCG/ACT inhaler Inhale 2 puffs into the lungs 2 times daily as needed    Buprenorphine HCl 300 MCG FILM Place 300 mcg inside cheek every 12 hours. calcium citrate-vitamin D (CITRACAL+D) 315-5 MG-MCG TABS per tablet Take 1 tablet by mouth daily (with breakfast)     No current facility-administered medications for this encounter. YOU MUST ONLY TAKE THESE MEDICATIONS THE MORNING OF SURGERY  Cymbalta, gabapentin,clonidine, belbuca  MEDICATIONS TO TAKE THE MORNING OF SURGERY ONLY IF NEEDED:   HOLD these prescription medications BEFORE Surgery: . Ask your surgeon/prescribing physician about when/if to STOP taking these medications: Susan Hair (If you are currently taking Plavix, Coumadin, or any other blood-thinning/anticoagulant medication contact your prescribing physician for instructions). Stop any non-steroidal anti-inflammatory drugs (i.e. Ibuprofen, Naproxen, Advil, Aleve,) 7 days before surgery. You may take Tylenol. STOP all vitamins, herbal supplements, BC Powder, Excedrin and Aspirin 1 week prior to  surgery. Preventing Infections Before and After - Your Surgery    IMPORTANT INSTRUCTIONS    You play an important role in your health and preparation for surgery. To reduce the germs on your skin you will need to shower with CHG soap (Chorhexidine gluconate 4%) two times before surgery.     CHG soap (Hibiclens, Hex-A-Clens or store morning dose until after the COVID test has been performed. How to use Chlorhexidine (CHG) 4% liquid soap  Purchase an 8 ounce bottle of CHG liquid soap (Chlorhexidine 4%, Hibiclens, Hex-A-Clens or store brand) at a pharmacy or grocery store. Wash your hair with your normal shampoo and your body with regular soap and rinse well to remove shampoo and soap from your skin. Wet a clean washcloth and turn off the shower. Put CHG soap on washcloth and apply to your entire body from the neck down. Do not use on your head, face or private parts(genitals). Do not use CHG soap on open sores, wounds or areas of skin irritation. Wash your body gently for 5 minutes. Do not wash your skin too hard. This soap does not create lather. Pay special attention to your underarms and from your belly button to your feet. Turn the shower back on and rinse well to get CHG soap off your body. Pat your skin dry with a clean, dry towel. Do not apply lotions or moisturizer. Put on clean clothes and sleep on fresh bed sheets the night before surgery. Do not allow pets to sleep with you. Day of Procedure    Please park in the parking deck or any designated visitor parking lot. Enter the facility through the Main Entrance of the hospital.  On the day of surgery, please provide the cell phone number of the person who will be waiting for you to the Patient Access representative at the time of registration. Masks are highly recommended in the hospital, but not required. Once your procedure and the immediate recovery period is completed, a nurse in the recovery area will contact your designated visitor to inform them of your room number or to otherwise review other pertinent information regarding your care. Social distancing practices are strongly encouraged in waiting areas and the cafeteria. The patient was contacted in person. She verbalized understanding of all instructions does not need reinforcement.

## 2023-11-04 LAB
BACTERIA SPEC CULT: NORMAL
BACTERIA SPEC CULT: NORMAL
SERVICE CMNT-IMP: NORMAL

## 2023-11-08 NOTE — H&P
signed by Kiana Banegas MD on 2023 at 11:58 AM      1. S/P cervical spinal fusion  -     XR CERVICAL SPINE (2-3 VIEWS); Future  -     XR THORACIC SPINE (3 VIEWS); Future  2. Failed spinal cord stimulator, initial encounter ([address])  3. Chronic bilateral low back pain without sciatica        No follow-ups on file. SUBJECTIVE/OBJECTIVE:  Ariel Schwarz (: 1965) is a 63 y.o. female. No flowsheet data found. Patient presents today approximately 12 weeks status post recent revision ACDF. She continues to progress well following her surgery. She has some mild stiffness in her neck but that is improving. She mainly today wants to discuss possibly changing out her DCS stimulator for a MRI compatible 1. She is also unable to charge it more than 2 days at a time. No other complaints. Imaging:     Xray Result (most recent):  XR CERVICAL SPINE (2-3 VIEWS) 10/17/2023     Narrative  AP and lateral of the cervical spine demonstrates a stable cervical fusion C4-C6. Alignment maintained. No acute changes. Xray Result (most recent):  XR THORACIC SPINE (3 VIEWS) 10/17/2023     Narrative  AP and lateral of the thoracic spine reviewed today. Stable thoracic paddle lead placement with coverage of T8 and the inferior portion of T7. MRI Result (most recent):  No results found for this or any previous visit from the past 3650 days.               Allergies   Allergen Reactions    Levofloxacin Anaphylaxis       IV ONLY    Sulfamethoxazole-Trimethoprim Itching and Rash    Morphine Other (See Comments)       PT STATED DOES NOT WORK ON HER         Current Facility-Administered Medications          Current Outpatient Medications   Medication Sig Dispense Refill    buPROPion (WELLBUTRIN XL) 150 MG extended release tablet Take 1 tablet by mouth every morning        ipratropium 0.5 mg-albuterol 2.5 mg (DUONEB) 0.5-2.5 (3) MG/3ML SOLN nebulizer solution Inhale 3 mLs into the lungs in the morning and

## 2023-11-13 ENCOUNTER — ANESTHESIA (OUTPATIENT)
Facility: HOSPITAL | Age: 58
End: 2023-11-13
Payer: MEDICARE

## 2023-11-13 ENCOUNTER — ANESTHESIA EVENT (OUTPATIENT)
Facility: HOSPITAL | Age: 58
End: 2023-11-13
Payer: MEDICARE

## 2023-11-13 ENCOUNTER — HOSPITAL ENCOUNTER (OUTPATIENT)
Facility: HOSPITAL | Age: 58
Discharge: HOME HEALTH | End: 2023-11-13
Attending: ORTHOPAEDIC SURGERY | Admitting: ORTHOPAEDIC SURGERY
Payer: MEDICARE

## 2023-11-13 VITALS
OXYGEN SATURATION: 96 % | DIASTOLIC BLOOD PRESSURE: 68 MMHG | TEMPERATURE: 98.6 F | RESPIRATION RATE: 17 BRPM | HEIGHT: 63 IN | HEART RATE: 97 BPM | BODY MASS INDEX: 33.2 KG/M2 | WEIGHT: 187.39 LBS | SYSTOLIC BLOOD PRESSURE: 121 MMHG

## 2023-11-13 PROBLEM — T85.192A: Status: ACTIVE | Noted: 2023-11-13

## 2023-11-13 LAB
GLUCOSE BLD STRIP.AUTO-MCNC: 88 MG/DL (ref 65–117)
SERVICE CMNT-IMP: NORMAL

## 2023-11-13 PROCEDURE — 6360000002 HC RX W HCPCS: Performed by: NURSE ANESTHETIST, CERTIFIED REGISTERED

## 2023-11-13 PROCEDURE — 7100000000 HC PACU RECOVERY - FIRST 15 MIN: Performed by: ORTHOPAEDIC SURGERY

## 2023-11-13 PROCEDURE — 2500000003 HC RX 250 WO HCPCS: Performed by: NURSE ANESTHETIST, CERTIFIED REGISTERED

## 2023-11-13 PROCEDURE — 3600000002 HC SURGERY LEVEL 2 BASE: Performed by: ORTHOPAEDIC SURGERY

## 2023-11-13 PROCEDURE — 2500000003 HC RX 250 WO HCPCS: Performed by: ANESTHESIOLOGY

## 2023-11-13 PROCEDURE — 2580000003 HC RX 258: Performed by: STUDENT IN AN ORGANIZED HEALTH CARE EDUCATION/TRAINING PROGRAM

## 2023-11-13 PROCEDURE — 6360000002 HC RX W HCPCS: Performed by: PHYSICIAN ASSISTANT

## 2023-11-13 PROCEDURE — 2709999900 HC NON-CHARGEABLE SUPPLY: Performed by: ORTHOPAEDIC SURGERY

## 2023-11-13 PROCEDURE — 2580000003 HC RX 258: Performed by: PHYSICIAN ASSISTANT

## 2023-11-13 PROCEDURE — 97161 PT EVAL LOW COMPLEX 20 MIN: CPT

## 2023-11-13 PROCEDURE — 3600000012 HC SURGERY LEVEL 2 ADDTL 15MIN: Performed by: ORTHOPAEDIC SURGERY

## 2023-11-13 PROCEDURE — 97116 GAIT TRAINING THERAPY: CPT

## 2023-11-13 PROCEDURE — 6370000000 HC RX 637 (ALT 250 FOR IP): Performed by: ORTHOPAEDIC SURGERY

## 2023-11-13 PROCEDURE — 3700000000 HC ANESTHESIA ATTENDED CARE: Performed by: ORTHOPAEDIC SURGERY

## 2023-11-13 PROCEDURE — 3700000001 HC ADD 15 MINUTES (ANESTHESIA): Performed by: ORTHOPAEDIC SURGERY

## 2023-11-13 PROCEDURE — 7100000001 HC PACU RECOVERY - ADDTL 15 MIN: Performed by: ORTHOPAEDIC SURGERY

## 2023-11-13 PROCEDURE — 2580000003 HC RX 258: Performed by: NURSE ANESTHETIST, CERTIFIED REGISTERED

## 2023-11-13 PROCEDURE — 6370000000 HC RX 637 (ALT 250 FOR IP): Performed by: ANESTHESIOLOGY

## 2023-11-13 PROCEDURE — 6370000000 HC RX 637 (ALT 250 FOR IP): Performed by: NURSE ANESTHETIST, CERTIFIED REGISTERED

## 2023-11-13 PROCEDURE — 82962 GLUCOSE BLOOD TEST: CPT

## 2023-11-13 PROCEDURE — 2580000003 HC RX 258: Performed by: ANESTHESIOLOGY

## 2023-11-13 PROCEDURE — C1820 GENERATOR NEURO RECHG BAT SY: HCPCS | Performed by: ORTHOPAEDIC SURGERY

## 2023-11-13 DEVICE — IMPLANTABLE PULSE GENERATOR
Type: IMPLANTABLE DEVICE | Site: SPINE LUMBAR | Status: FUNCTIONAL
Brand: ETERNA™

## 2023-11-13 RX ORDER — ACETAMINOPHEN 500 MG
1000 TABLET ORAL EVERY 6 HOURS
Status: DISCONTINUED | OUTPATIENT
Start: 2023-11-13 | End: 2023-11-13 | Stop reason: HOSPADM

## 2023-11-13 RX ORDER — SODIUM CHLORIDE 9 MG/ML
INJECTION, SOLUTION INTRAVENOUS PRN
Status: DISCONTINUED | OUTPATIENT
Start: 2023-11-13 | End: 2023-11-13 | Stop reason: HOSPADM

## 2023-11-13 RX ORDER — ONDANSETRON 2 MG/ML
4 INJECTION INTRAMUSCULAR; INTRAVENOUS EVERY 6 HOURS PRN
Status: DISCONTINUED | OUTPATIENT
Start: 2023-11-13 | End: 2023-11-13 | Stop reason: HOSPADM

## 2023-11-13 RX ORDER — ALBUTEROL SULFATE 2.5 MG/3ML
2.5 SOLUTION RESPIRATORY (INHALATION) 2 TIMES DAILY PRN
Status: DISCONTINUED | OUTPATIENT
Start: 2023-11-13 | End: 2023-11-13 | Stop reason: HOSPADM

## 2023-11-13 RX ORDER — SODIUM CHLORIDE 0.9 % (FLUSH) 0.9 %
5-40 SYRINGE (ML) INJECTION EVERY 12 HOURS SCHEDULED
Status: DISCONTINUED | OUTPATIENT
Start: 2023-11-13 | End: 2023-11-13 | Stop reason: HOSPADM

## 2023-11-13 RX ORDER — BUPROPION HYDROCHLORIDE 150 MG/1
150 TABLET ORAL EVERY MORNING
Status: DISCONTINUED | OUTPATIENT
Start: 2023-11-14 | End: 2023-11-13 | Stop reason: RX

## 2023-11-13 RX ORDER — HYDROMORPHONE HYDROCHLORIDE 1 MG/ML
0.5 INJECTION, SOLUTION INTRAMUSCULAR; INTRAVENOUS; SUBCUTANEOUS EVERY 5 MIN PRN
Status: DISCONTINUED | OUTPATIENT
Start: 2023-11-13 | End: 2023-11-13 | Stop reason: HOSPADM

## 2023-11-13 RX ORDER — SODIUM CHLORIDE 0.9 % (FLUSH) 0.9 %
5-40 SYRINGE (ML) INJECTION PRN
Status: DISCONTINUED | OUTPATIENT
Start: 2023-11-13 | End: 2023-11-13 | Stop reason: HOSPADM

## 2023-11-13 RX ORDER — UBIDECARENONE 100 MG
400 CAPSULE ORAL DAILY
Status: DISCONTINUED | OUTPATIENT
Start: 2023-11-13 | End: 2023-11-13 | Stop reason: HOSPADM

## 2023-11-13 RX ORDER — FENTANYL CITRATE 50 UG/ML
25 INJECTION, SOLUTION INTRAMUSCULAR; INTRAVENOUS EVERY 5 MIN PRN
Status: DISCONTINUED | OUTPATIENT
Start: 2023-11-13 | End: 2023-11-13 | Stop reason: HOSPADM

## 2023-11-13 RX ORDER — CALCIUM CARBONATE 500(1250)
500 TABLET ORAL DAILY
Status: DISCONTINUED | OUTPATIENT
Start: 2023-11-14 | End: 2023-11-13 | Stop reason: HOSPADM

## 2023-11-13 RX ORDER — PROCHLORPERAZINE EDISYLATE 5 MG/ML
5 INJECTION INTRAMUSCULAR; INTRAVENOUS
Status: DISCONTINUED | OUTPATIENT
Start: 2023-11-13 | End: 2023-11-13 | Stop reason: HOSPADM

## 2023-11-13 RX ORDER — CYCLOBENZAPRINE HCL 10 MG
10 TABLET ORAL EVERY 12 HOURS PRN
Status: DISCONTINUED | OUTPATIENT
Start: 2023-11-13 | End: 2023-11-13 | Stop reason: HOSPADM

## 2023-11-13 RX ORDER — MIDAZOLAM HYDROCHLORIDE 2 MG/2ML
2 INJECTION, SOLUTION INTRAMUSCULAR; INTRAVENOUS
Status: DISCONTINUED | OUTPATIENT
Start: 2023-11-13 | End: 2023-11-13 | Stop reason: HOSPADM

## 2023-11-13 RX ORDER — ACETAMINOPHEN 500 MG
1000 TABLET ORAL ONCE
Status: COMPLETED | OUTPATIENT
Start: 2023-11-13 | End: 2023-11-13

## 2023-11-13 RX ORDER — LIDOCAINE HYDROCHLORIDE 10 MG/ML
1 INJECTION, SOLUTION EPIDURAL; INFILTRATION; INTRACAUDAL; PERINEURAL
Status: COMPLETED | OUTPATIENT
Start: 2023-11-13 | End: 2023-11-13

## 2023-11-13 RX ORDER — POLYETHYLENE GLYCOL 3350 17 G/17G
17 POWDER, FOR SOLUTION ORAL DAILY
Status: DISCONTINUED | OUTPATIENT
Start: 2023-11-13 | End: 2023-11-13 | Stop reason: HOSPADM

## 2023-11-13 RX ORDER — DIPHENHYDRAMINE HYDROCHLORIDE 50 MG/ML
25 INJECTION INTRAMUSCULAR; INTRAVENOUS EVERY 6 HOURS PRN
Status: DISCONTINUED | OUTPATIENT
Start: 2023-11-13 | End: 2023-11-13 | Stop reason: HOSPADM

## 2023-11-13 RX ORDER — SODIUM CHLORIDE 9 MG/ML
INJECTION, SOLUTION INTRAVENOUS CONTINUOUS
Status: DISCONTINUED | OUTPATIENT
Start: 2023-11-13 | End: 2023-11-13 | Stop reason: HOSPADM

## 2023-11-13 RX ORDER — MONTELUKAST SODIUM 10 MG/1
10 TABLET ORAL NIGHTLY
Status: DISCONTINUED | OUTPATIENT
Start: 2023-11-13 | End: 2023-11-13 | Stop reason: HOSPADM

## 2023-11-13 RX ORDER — FENTANYL CITRATE 50 UG/ML
INJECTION, SOLUTION INTRAMUSCULAR; INTRAVENOUS PRN
Status: DISCONTINUED | OUTPATIENT
Start: 2023-11-13 | End: 2023-11-13 | Stop reason: SDUPTHER

## 2023-11-13 RX ORDER — SCOLOPAMINE TRANSDERMAL SYSTEM 1 MG/1
PATCH, EXTENDED RELEASE TRANSDERMAL PRN
Status: DISCONTINUED | OUTPATIENT
Start: 2023-11-13 | End: 2023-11-13 | Stop reason: SDUPTHER

## 2023-11-13 RX ORDER — SENNA AND DOCUSATE SODIUM 50; 8.6 MG/1; MG/1
1 TABLET, FILM COATED ORAL 2 TIMES DAILY
Status: DISCONTINUED | OUTPATIENT
Start: 2023-11-13 | End: 2023-11-13 | Stop reason: HOSPADM

## 2023-11-13 RX ORDER — FENTANYL CITRATE 50 UG/ML
100 INJECTION, SOLUTION INTRAMUSCULAR; INTRAVENOUS
Status: DISCONTINUED | OUTPATIENT
Start: 2023-11-13 | End: 2023-11-13 | Stop reason: HOSPADM

## 2023-11-13 RX ORDER — CETIRIZINE HYDROCHLORIDE 10 MG/1
10 TABLET ORAL NIGHTLY
Status: DISCONTINUED | OUTPATIENT
Start: 2023-11-13 | End: 2023-11-13 | Stop reason: HOSPADM

## 2023-11-13 RX ORDER — ALBUTEROL SULFATE 90 UG/1
2 AEROSOL, METERED RESPIRATORY (INHALATION) 2 TIMES DAILY PRN
Status: DISCONTINUED | OUTPATIENT
Start: 2023-11-13 | End: 2023-11-13 | Stop reason: HOSPADM

## 2023-11-13 RX ORDER — HYDROMORPHONE HYDROCHLORIDE 1 MG/ML
0.5 INJECTION, SOLUTION INTRAMUSCULAR; INTRAVENOUS; SUBCUTANEOUS
Status: DISCONTINUED | OUTPATIENT
Start: 2023-11-13 | End: 2023-11-13 | Stop reason: HOSPADM

## 2023-11-13 RX ORDER — BUPROPION HYDROCHLORIDE 150 MG/1
150 TABLET, EXTENDED RELEASE ORAL DAILY
Status: DISCONTINUED | OUTPATIENT
Start: 2023-11-14 | End: 2023-11-13 | Stop reason: HOSPADM

## 2023-11-13 RX ORDER — GABAPENTIN 300 MG/1
900 CAPSULE ORAL 3 TIMES DAILY
Status: DISCONTINUED | OUTPATIENT
Start: 2023-11-13 | End: 2023-11-13 | Stop reason: HOSPADM

## 2023-11-13 RX ORDER — OXYCODONE HYDROCHLORIDE 5 MG/1
5 TABLET ORAL
Status: DISCONTINUED | OUTPATIENT
Start: 2023-11-13 | End: 2023-11-13 | Stop reason: HOSPADM

## 2023-11-13 RX ORDER — GUAIFENESIN 600 MG/1
1200 TABLET, EXTENDED RELEASE ORAL 2 TIMES DAILY
Status: DISCONTINUED | OUTPATIENT
Start: 2023-11-13 | End: 2023-11-13 | Stop reason: HOSPADM

## 2023-11-13 RX ORDER — DULOXETIN HYDROCHLORIDE 60 MG/1
60 CAPSULE, DELAYED RELEASE ORAL 2 TIMES DAILY
Status: DISCONTINUED | OUTPATIENT
Start: 2023-11-13 | End: 2023-11-13 | Stop reason: HOSPADM

## 2023-11-13 RX ORDER — DIPHENHYDRAMINE HCL 25 MG
25 CAPSULE ORAL EVERY 6 HOURS PRN
Status: DISCONTINUED | OUTPATIENT
Start: 2023-11-13 | End: 2023-11-13 | Stop reason: HOSPADM

## 2023-11-13 RX ORDER — MIDAZOLAM HYDROCHLORIDE 1 MG/ML
INJECTION INTRAMUSCULAR; INTRAVENOUS PRN
Status: DISCONTINUED | OUTPATIENT
Start: 2023-11-13 | End: 2023-11-13 | Stop reason: SDUPTHER

## 2023-11-13 RX ORDER — PHENYLEPHRINE HCL IN 0.9% NACL 0.4MG/10ML
SYRINGE (ML) INTRAVENOUS PRN
Status: DISCONTINUED | OUTPATIENT
Start: 2023-11-13 | End: 2023-11-13 | Stop reason: SDUPTHER

## 2023-11-13 RX ORDER — ONDANSETRON 2 MG/ML
4 INJECTION INTRAMUSCULAR; INTRAVENOUS
Status: DISCONTINUED | OUTPATIENT
Start: 2023-11-13 | End: 2023-11-13 | Stop reason: HOSPADM

## 2023-11-13 RX ORDER — HYDRALAZINE HYDROCHLORIDE 20 MG/ML
10 INJECTION INTRAMUSCULAR; INTRAVENOUS
Status: DISCONTINUED | OUTPATIENT
Start: 2023-11-13 | End: 2023-11-13 | Stop reason: HOSPADM

## 2023-11-13 RX ORDER — OXYCODONE HYDROCHLORIDE 5 MG/1
5 TABLET ORAL EVERY 4 HOURS PRN
Status: DISCONTINUED | OUTPATIENT
Start: 2023-11-13 | End: 2023-11-13 | Stop reason: HOSPADM

## 2023-11-13 RX ORDER — CHOLECALCIFEROL (VITAMIN D3) 125 MCG
1000 CAPSULE ORAL DAILY
Status: DISCONTINUED | OUTPATIENT
Start: 2023-11-13 | End: 2023-11-13 | Stop reason: HOSPADM

## 2023-11-13 RX ORDER — ONDANSETRON 2 MG/ML
INJECTION INTRAMUSCULAR; INTRAVENOUS PRN
Status: DISCONTINUED | OUTPATIENT
Start: 2023-11-13 | End: 2023-11-13 | Stop reason: SDUPTHER

## 2023-11-13 RX ORDER — DEXAMETHASONE SODIUM PHOSPHATE 4 MG/ML
INJECTION, SOLUTION INTRA-ARTICULAR; INTRALESIONAL; INTRAMUSCULAR; INTRAVENOUS; SOFT TISSUE PRN
Status: DISCONTINUED | OUTPATIENT
Start: 2023-11-13 | End: 2023-11-13 | Stop reason: SDUPTHER

## 2023-11-13 RX ORDER — ONDANSETRON 4 MG/1
4 TABLET, ORALLY DISINTEGRATING ORAL EVERY 8 HOURS PRN
Status: DISCONTINUED | OUTPATIENT
Start: 2023-11-13 | End: 2023-11-13 | Stop reason: HOSPADM

## 2023-11-13 RX ORDER — OXYCODONE HYDROCHLORIDE 5 MG/1
10 TABLET ORAL EVERY 4 HOURS PRN
Status: DISCONTINUED | OUTPATIENT
Start: 2023-11-13 | End: 2023-11-13 | Stop reason: HOSPADM

## 2023-11-13 RX ORDER — PNV NO.95/FERROUS FUM/FOLIC AC 28MG-0.8MG
65 TABLET ORAL DAILY
Status: DISCONTINUED | OUTPATIENT
Start: 2023-11-13 | End: 2023-11-13 | Stop reason: HOSPADM

## 2023-11-13 RX ORDER — FAMOTIDINE 20 MG/1
20 TABLET, FILM COATED ORAL 2 TIMES DAILY
Status: DISCONTINUED | OUTPATIENT
Start: 2023-11-13 | End: 2023-11-13 | Stop reason: HOSPADM

## 2023-11-13 RX ORDER — SODIUM CHLORIDE, SODIUM LACTATE, POTASSIUM CHLORIDE, CALCIUM CHLORIDE 600; 310; 30; 20 MG/100ML; MG/100ML; MG/100ML; MG/100ML
INJECTION, SOLUTION INTRAVENOUS CONTINUOUS
Status: DISCONTINUED | OUTPATIENT
Start: 2023-11-13 | End: 2023-11-13 | Stop reason: HOSPADM

## 2023-11-13 RX ORDER — LANOLIN ALCOHOL/MO/W.PET/CERES
1 CREAM (GRAM) TOPICAL
Status: DISCONTINUED | OUTPATIENT
Start: 2023-11-14 | End: 2023-11-13 | Stop reason: SDUPTHER

## 2023-11-13 RX ADMIN — LIDOCAINE HYDROCHLORIDE 50 MG: 20 INJECTION, SOLUTION EPIDURAL; INFILTRATION; INTRACAUDAL; PERINEURAL at 12:01

## 2023-11-13 RX ADMIN — ACETAMINOPHEN 1000 MG: 500 TABLET ORAL at 11:48

## 2023-11-13 RX ADMIN — FENTANYL CITRATE 50 MCG: 50 INJECTION, SOLUTION INTRAMUSCULAR; INTRAVENOUS at 12:20

## 2023-11-13 RX ADMIN — ONDANSETRON HYDROCHLORIDE 4 MG: 2 INJECTION, SOLUTION INTRAMUSCULAR; INTRAVENOUS at 12:18

## 2023-11-13 RX ADMIN — SODIUM CHLORIDE: 9 INJECTION, SOLUTION INTRAVENOUS at 14:43

## 2023-11-13 RX ADMIN — PROPOFOL 50 MG: 10 INJECTION, EMULSION INTRAVENOUS at 12:03

## 2023-11-13 RX ADMIN — Medication 80 MCG: at 12:03

## 2023-11-13 RX ADMIN — WATER 2000 MG: 1 INJECTION INTRAMUSCULAR; INTRAVENOUS; SUBCUTANEOUS at 12:17

## 2023-11-13 RX ADMIN — DEXAMETHASONE SODIUM PHOSPHATE 6 MG: 4 INJECTION, SOLUTION INTRAMUSCULAR; INTRAVENOUS at 12:18

## 2023-11-13 RX ADMIN — LIDOCAINE HYDROCHLORIDE 1 ML: 10 INJECTION, SOLUTION EPIDURAL; INFILTRATION; INTRACAUDAL; PERINEURAL at 10:58

## 2023-11-13 RX ADMIN — PROPOFOL 150 MG: 10 INJECTION, EMULSION INTRAVENOUS at 12:01

## 2023-11-13 RX ADMIN — Medication 80 MCG: at 12:02

## 2023-11-13 RX ADMIN — Medication 120 MCG: at 12:07

## 2023-11-13 RX ADMIN — SODIUM CHLORIDE, POTASSIUM CHLORIDE, SODIUM LACTATE AND CALCIUM CHLORIDE: 600; 310; 30; 20 INJECTION, SOLUTION INTRAVENOUS at 10:59

## 2023-11-13 RX ADMIN — MIDAZOLAM 2 MG: 1 INJECTION INTRAMUSCULAR; INTRAVENOUS at 11:51

## 2023-11-13 RX ADMIN — ONDANSETRON HYDROCHLORIDE 4 MG: 2 INJECTION, SOLUTION INTRAMUSCULAR; INTRAVENOUS at 12:35

## 2023-11-13 RX ADMIN — Medication 120 MCG: at 12:06

## 2023-11-13 RX ADMIN — MIDAZOLAM 2 MG: 1 INJECTION INTRAMUSCULAR; INTRAVENOUS at 11:49

## 2023-11-13 RX ADMIN — SCOPALAMINE 1 PATCH: 1 PATCH, EXTENDED RELEASE TRANSDERMAL at 11:49

## 2023-11-13 RX ADMIN — PHENYLEPHRINE HYDROCHLORIDE 40 MCG/MIN: 10 INJECTION INTRAVENOUS at 12:07

## 2023-11-13 RX ADMIN — PROPOFOL 150 MCG/KG/MIN: 10 INJECTION, EMULSION INTRAVENOUS at 12:02

## 2023-11-13 ASSESSMENT — PAIN - FUNCTIONAL ASSESSMENT: PAIN_FUNCTIONAL_ASSESSMENT: 0-10

## 2023-11-13 ASSESSMENT — PAIN DESCRIPTION - DESCRIPTORS: DESCRIPTORS: ACHING;BURNING;SHOOTING

## 2023-11-13 NOTE — ANESTHESIA POSTPROCEDURE EVALUATION
Department of Anesthesiology  Postprocedure Note    Patient: Ariel Schwarz  MRN: [id number]  YOB: 1965  Date of evaluation: 11/13/2023      Procedure Summary     Date: 11/13/23 Room / Location: [facility] MAIN OR 02 / [facility] MA[facility] OR    Anesthesia Start: 1149 Anesthesia Stop: 1314    Procedure: DCS BATTERY EXCHANGE (Spine Lumbar) Diagnosis:       S/P cervical spinal fusion      Bilateral low back pain without sciatica, unspecified chronicity      (S/P cervical spinal fusion [Z98.1])      (Bilateral low back pain without sciatica, unspecified chronicity [M54.50])    Providers: Kiana Banegas MD Responsible Provider: Gabriela Sutherland MD    Anesthesia Type: General ASA Status: 3          Anesthesia Type: General    Сергей Phase I: Сергей Score: 10    Сергей Phase II:        Anesthesia Post Evaluation    Patient location during evaluation: PACU  Patient participation: complete - patient participated  Level of consciousness: awake  Airway patency: patent  Nausea & Vomiting: no nausea  Complications: no  Cardiovascular status: blood pressure returned to baseline and hemodynamically stable  Respiratory status: acceptable  Hydration status: stable  Multimodal analgesia pain management approach

## 2023-11-13 NOTE — PLAN OF CARE
Problem: Pain  Goal: Verbalizes/displays adequate comfort level or baseline comfort level  Outcome: HH/HSPC Resolved Met     Problem: Safety - Adult  Goal: Free from fall injury  Outcome: [facility] 114 Resolved Met     Problem: Discharge Planning  Goal: Discharge to home or other facility with appropriate resources  Outcome: [facility] 114 Resolved Met

## 2023-11-13 NOTE — BRIEF OP NOTE
Brief Postoperative Note      Patient: Charley Fofana  YOB: 1965  MRN: [id number]    Date of Procedure: 11/13/2023    Pre-Op Diagnosis Codes:     * S/P cervical spinal fusion [Z98.1]     * Bilateral low back pain without sciatica, unspecified chronicity [M54.50]    Post-Op Diagnosis: Same       Procedure(s):  DCS BATTERY EXCHANGE    Surgeon(s):  Ranjith Montes De Oca MD    Assistant:  Physician Assistant: RONNIE Carpenter    Anesthesia: General    Estimated Blood Loss (mL): Minimal    Complications: None    Specimens:   * No specimens in log *    Implants:  Implant Name Type Inv.  Item Serial No.  Lot No. LRB No. Used Action   [id number] - [id number]   [id number] [employer] N/A N/A 1 Implanted         Drains: * No LDAs found *    Findings: battery replacement       Electronically signed by RONNIE Carpenter on 11/13/2023 at 12:45 PM

## 2023-11-13 NOTE — ANESTHESIA PRE PROCEDURE
Department of Anesthesiology  Preprocedure Note       Name:  Pebbles Rushing   Age:  63 y.o.  :  1965                                          MRN:  [id number]         Date:  2023      Surgeon: Cameron Moran):  Abhishek Anthony MD    Procedure: Procedure(s):  DCS BATTERY EXCHANGE AND POSSIBLE PADDLE LEAD REVISION    Medications prior to admission:   Prior to Admission medications    Medication Sig Start Date End Date Taking? Authorizing Provider   Semaglutide-Weight Management (WEGOVY) 1.7 MG/0.75ML SOAJ SC injection Inject 1.7 mg into the skin every 7 days Every tuesday    Sen Cramer MD   tezepelumab-ekko (TEZSPIRE) 210 MG/1. 91ML SOSY injection Inject 1.91 mLs into the skin every 30 days    Sen Cramer MD   buPROPion (WELLBUTRIN XL) 150 MG extended release tablet Take 1 tablet by mouth every morning    Sen Cramer MD   ipratropium 0.5 mg-albuterol 2.5 mg (DUONEB) 0.5-2.5 (3) MG/3ML SOLN nebulizer solution Inhale 3 mLs into the lungs in the morning and 3 mLs at noon and 3 mLs in the evening and 3 mLs before bedtime.   Patient not taking: Reported on 2023    Sen Cramer MD   Sodium Chloride (HYPERSAL) 3.5 % NEBU Inhale into the lungs as needed    Sen Cramer MD   Multiple Vitamins-Minerals (CENTRUM SILVER PO) Take 1 tablet by mouth daily    Sen Cramer MD   Ferrous Sulfate (IRON PO) Take 65 mg by mouth daily    Sen Cramer MD   cetirizine (ZYRTEC) 10 MG tablet Take 1 tablet by mouth at bedtime    Automatic Reconciliation, Ar   cloNIDine (CATAPRES) 0.1 MG tablet 1 tablet 3 times daily  Patient not taking: Reported on 2023 6/9/15   Automatic Reconciliation, Ar   coenzyme Q10 100 MG CAPS capsule Take 4 capsules by mouth daily    Automatic Reconciliation, Ar   cyanocobalamin 1000 MCG tablet Take 1 tablet by mouth daily    Automatic Reconciliation, Ar   DULoxetine (CYMBALTA) 60 MG extended release capsule 1 capsule 2 times daily

## 2023-11-13 NOTE — OP NOTE
[facility]  OPERATIVE REPORT    Name:  Lm Ellis  MR#:  [id number]  :  1965  ACCOUNT #:  [de-identified]  DATE OF SERVICE:  2023    PREOPERATIVE DIAGNOSIS:  Failed implantable pulse generator unit of the dorsal column stimulator lead. POSTOPERATIVE DIAGNOSIS:  Failed implantable pulse generator unit of the dorsal column stimulator lead    PROCEDURE PERFORMED:  Dorsal column stimulator battery lead replacement. SURGEON:  Ally Bob MD    ASSISTANT:  Ye Saavedra PA-C    ANESTHESIA:  General    COMPLICATIONS:  None. SPECIMENS REMOVED:  None. DRAINS:  None. IMPLANTS:  Abbott Spine IPG Unit    ESTIMATED BLOOD LOSS:  Minimal    INDICATIONS:  This is a pleasant 55-year-old female with chronic lower back pain with a spinal cord stimulator which has helped control the symptoms. She is having difficulty with charging as well as placement of the IPG unit. She is for replacement of the unit as well as revision of the battery pocket. The patient understood  the risks and benefits, elected to proceed. PROCEDURE:  The patient was identified, brought to the operative suite, underwent general anesthesia without difficulty. She was placed in a prone position on the [place] frame. No complications. The left hip and buttock regions were prepped and draped sterilely. Time-out was confirmed. An incision was made directly over the previous IPG unit pocket. We bovied down carefully down through the previous unit. Unit was removed en bloc without difficulty. Disconnected the lead wires from that as well. We then used the Bovie cautery to make a pocket more distal as the previous one was somewhat superficial and overlying her belt line. We then tested with the newer battery. The new implantable pulse generator trialed with good fit noted, with less prominence of the implant. We then attached the lead wires back to the implant.   Impedance was checked and found to be

## 2023-11-13 NOTE — DISCHARGE INSTRUCTIONS
[facility] Dr. Marlen Larsen  [phone]    After [address] [facility]:  Discharge Instructions DCS Battery Exchange    Patient Name: Destin Garland    Date of procedure: [unfilled]  Date of discharge: 11/13/2023    Procedure: Procedure(s):  DCS BATTERY EXCHANGE  PCP: @PCP@    Follow up appointments  -Follow up with Dr. Marlen Larsen in 2 weeks. Call [phone] to make an appointment as soon as you get home from the hospital.    [address]: _________________________   phone: ___________________  The agency will contact you to arrange dates/times for visits. Please call them if you do not hear from them within 24 hours after you are discharged  Physical therapy 3 times a week for 3 weeks  Nursing-initial assessment and as needed    When to call your Orthopaedic Surgeon:  -Signs of infection-if your incision is red; continues to have drainage; drainage has a foul odor or if you have a persistent fever over 101 degrees for 24 hours  -Nausea or vomiting, severe headache  -Loss of bowel or bladder function, inability to urinate  -Changes in sensation in your arms or legs (numbness, tingling, loss of color)  -Increased weakness-greater than before your surgery  -Severe pain or pain not relieved by medications  -Signs of a blood clot in your leg-calf pain, tenderness, redness, swelling of lower leg    When to call your Primary Care Physician:  -Concerns about medical conditions such as diabetes, high blood pressure, asthma, congestive heart failure  -Call if blood sugars are elevated, persistent headache or dizziness, coughing or congestion, constipation or diarrhea, burning with urination, abnormal heart rate    When to call 911 and go to the nearest emergency room:  -Acute onset of chest pain, shortness of breath, difficulty breathing    Activity  - You are going home a well person, be as active as possible. Your only exercise should be walking.   Start with short frequent walks and

## 2023-12-21 ENCOUNTER — HOSPITAL ENCOUNTER (OUTPATIENT)
Facility: HOSPITAL | Age: 58
Discharge: HOME | End: 2023-12-24
Attending: ORTHOPAEDIC SURGERY
Payer: MEDICARE

## 2023-12-21 DIAGNOSIS — M54.50 CHRONIC BILATERAL LOW BACK PAIN WITHOUT SCIATICA: ICD-10-CM

## 2023-12-21 DIAGNOSIS — M54.50 CHRONIC BILATERAL LOW BACK PAIN, UNSPECIFIED WHETHER SCIATICA PRESENT: ICD-10-CM

## 2023-12-21 DIAGNOSIS — M54.42 CHRONIC BILATERAL LOW BACK PAIN WITH BILATERAL SCIATICA: ICD-10-CM

## 2023-12-21 DIAGNOSIS — G89.29 CHRONIC BILATERAL LOW BACK PAIN WITHOUT SCIATICA: ICD-10-CM

## 2023-12-21 DIAGNOSIS — M54.41 CHRONIC BILATERAL LOW BACK PAIN WITH BILATERAL SCIATICA: ICD-10-CM

## 2023-12-21 DIAGNOSIS — G89.29 CHRONIC BILATERAL LOW BACK PAIN WITH BILATERAL SCIATICA: ICD-10-CM

## 2023-12-21 DIAGNOSIS — G89.29 CHRONIC BILATERAL LOW BACK PAIN, UNSPECIFIED WHETHER SCIATICA PRESENT: ICD-10-CM

## 2023-12-21 DIAGNOSIS — Z98.1 S/P LUMBAR FUSION: ICD-10-CM

## 2023-12-21 PROCEDURE — 72148 MRI LUMBAR SPINE W/O DYE: CPT

## 2024-01-17 PROBLEM — M54.42 ACUTE BACK PAIN WITH SCIATICA, LEFT: Status: ACTIVE | Noted: 2024-01-17

## 2024-01-17 PROBLEM — Z98.1 ARTHRODESIS STATUS: Status: ACTIVE | Noted: 2024-01-17

## 2024-01-17 PROBLEM — M48.062 PSEUDOCLAUDICATION SYNDROME: Status: ACTIVE | Noted: 2024-01-17

## 2024-01-23 ENCOUNTER — HOSPITAL ENCOUNTER (OUTPATIENT)
Facility: HOSPITAL | Age: 59
Discharge: HOME | End: 2024-01-26
Payer: MEDICARE

## 2024-01-23 VITALS
HEART RATE: 80 BPM | TEMPERATURE: 97.5 F | HEIGHT: 63 IN | WEIGHT: 173 LBS | SYSTOLIC BLOOD PRESSURE: 134 MMHG | DIASTOLIC BLOOD PRESSURE: 84 MMHG | BODY MASS INDEX: 30.65 KG/M2

## 2024-01-23 LAB
ABO + RH BLD: NORMAL
ANION GAP SERPL CALC-SCNC: 4 MMOL/L (ref 5–15)
BLOOD GROUP ANTIBODIES SERPL: NORMAL
BUN SERPL-MCNC: 8 MG/DL (ref 6–20)
BUN/CREAT SERPL: 11 (ref 12–20)
CALCIUM SERPL-MCNC: 9.3 MG/DL (ref 8.5–10.1)
CHLORIDE SERPL-SCNC: 103 MMOL/L (ref 97–108)
CO2 SERPL-SCNC: 29 MMOL/L (ref 21–32)
CREAT SERPL-MCNC: 0.76 MG/DL (ref 0.55–1.02)
EST. AVERAGE GLUCOSE BLD GHB EST-MCNC: 100 MG/DL
GLUCOSE SERPL-MCNC: 90 MG/DL (ref 65–100)
HBA1C MFR BLD: 5.1 % (ref 4–5.6)
POTASSIUM SERPL-SCNC: 4.3 MMOL/L (ref 3.5–5.1)
SODIUM SERPL-SCNC: 136 MMOL/L (ref 136–145)
SPECIMEN EXP DATE BLD: NORMAL

## 2024-01-23 PROCEDURE — APPNB30 APP NON BILLABLE TIME 0-30 MINS: Performed by: NURSE PRACTITIONER

## 2024-01-23 PROCEDURE — 83036 HEMOGLOBIN GLYCOSYLATED A1C: CPT

## 2024-01-23 PROCEDURE — 81001 URINALYSIS AUTO W/SCOPE: CPT

## 2024-01-23 PROCEDURE — 36415 COLL VENOUS BLD VENIPUNCTURE: CPT

## 2024-01-23 PROCEDURE — 86850 RBC ANTIBODY SCREEN: CPT

## 2024-01-23 PROCEDURE — 80048 BASIC METABOLIC PNL TOTAL CA: CPT

## 2024-01-23 PROCEDURE — 86900 BLOOD TYPING SEROLOGIC ABO: CPT

## 2024-01-23 PROCEDURE — 86901 BLOOD TYPING SEROLOGIC RH(D): CPT

## 2024-01-23 ASSESSMENT — PAIN DESCRIPTION - DESCRIPTORS: DESCRIPTORS: ACHING;SORE

## 2024-01-23 ASSESSMENT — PAIN DESCRIPTION - ONSET: ONSET: ON-GOING

## 2024-01-23 ASSESSMENT — PAIN DESCRIPTION - FREQUENCY: FREQUENCY: CONTINUOUS

## 2024-01-23 ASSESSMENT — PAIN SCALES - GENERAL: PAINLEVEL_OUTOF10: 7

## 2024-01-23 ASSESSMENT — PAIN DESCRIPTION - PAIN TYPE: TYPE: CHRONIC PAIN

## 2024-01-23 ASSESSMENT — PAIN DESCRIPTION - ORIENTATION: ORIENTATION: LOWER

## 2024-01-23 ASSESSMENT — PAIN DESCRIPTION - LOCATION: LOCATION: BACK

## 2024-01-23 ASSESSMENT — PAIN - FUNCTIONAL ASSESSMENT: PAIN_FUNCTIONAL_ASSESSMENT: PREVENTS OR INTERFERES WITH MANY ACTIVE NOT PASSIVE ACTIVITIES

## 2024-01-23 NOTE — PERIOP NOTE
PT HAS AN APPT WITH HER PULMONOLOGIST, DR. WELLS/[phone] TODAY, 1/23/24 AT 1PM FOR PULMONARY CLEARANCE PRIOR TO SURGERY.  ML FOR NURSE TO CALL OFFICE TOMORROW TO GET CLEARANCE.    PT HAS A SPINAL CORD STIMULATOR IN PLACE THAT IS MANAGED BY DR. NAPOLES/[phone].    PT WAS INSTRUCTED TO REMOVE HER FENTANYL PATCH THE DAY OF SURGERY.  PT VERBALIZED UNDERSTANDING.    CALLED TO CARDIOLOGY/DR. CHEUNG/[phone] AND REQ ANY RECENT NOTES TO BE FAXED. NOTES FROM [facility] ON 4/15/2019 REC'D AND PLACED ON CHART.    CBC AND PT ON CHART THAT WERE DONE W/ PCP ON 1/22/24.  PACKET FROM PCP VISIT INDICATES OTHER LABS WERE DONE AS WELL, NO RESULTS REC'D BY END OF PAT APPT, SO ORTHO PROTOCOL LABS OBTAINED.    PT CAME WITH MEDICAL CLEARANCE PACKET FROM PCP/ZAID JUAN/[phone].  IN THIS PACKET WAS AN EKG DONE IN HER OFFICE ON 1/22/24, BUT IT IS NOT SIGNED BY PROVIDER.  CALLED TO PCP OFFICE AND REQ THAT A SIGNED COPY OF EKG BE FAXED TO PAT.    PT INSTRUCTED ON ERAS NUTRITIONAL PLAN AND WAS GIVEN THE BAG OF DRINKS FOR DIABETICS.  PT IS NOT DIABETIC, BUT HAS HAD GASTRIC BYPASS SURGERY WITH A REVISION AND WATCHES SUGARS CLOSELY.  PT VERBALIZED UNDERSTANDING OF INSTRUCTIONS.    [facility] Nutrition Screen    1. Has the patient had an unplanned weight loss of 10% of body weight or more in the last 6 months? No = 0   2. Has the patient been eating less than 50% of their normal diet in the preceding week? No = 0       Patient instructed on Diabetic pre-operative nutrition plan to start 5 days prior to surgery. Patient given 10 shakes and 1 pre-surgery drinks. Opportunity given for questions and all questions answered.   
Q-tip.  Apply ointment just inside of each nostril with the Q-tip. Do not push Q-tip or ointment deep inside you nose.  Press your nostrils together and massage for a few seconds.  Wash your hands with  gel or soap and water after you are finished.  Do not get ointment near your eyes. If it gets into your eyes, rinse them with cool water.  If you need to use nasal spray, clean the tip of the bottle with alcohol before use and do not use both at the same time.  If you are scheduled for COVID testing during the 5 days, do NOT apply morning dose until after the COVID test has been performed.     How to use Chlorhexidine (CHG) 4% liquid soap  Purchase an 8 ounce bottle of CHG liquid soap (Chlorhexidine 4%, Hibiclens, Hex-A-Clens or store brand) at a pharmacy or grocery store.  Wash your hair with your normal shampoo and your body with regular soap and rinse well to remove shampoo and soap from your skin.  Wet a clean washcloth and turn off the shower.  Put CHG soap on washcloth and apply to your entire body from the neck down. Do not use on your head, face or private parts(genitals). Do not use CHG soap on open sores, wounds or areas of skin irritation.  Wash your body gently for 5 minutes. Do not wash your skin too hard. This soap does not create lather. Pay special attention to your underarms and from your belly button to your feet.  Turn the shower back on and rinse well to get CHG soap off your body.  Pat your skin dry with a clean, dry towel. Do not apply lotions or moisturizer.  Put on clean clothes and sleep on fresh bed sheets the night before surgery. Do not allow pets to sleep with you.        Day of Procedure    Please park in the parking deck or any designated visitor parking lot.   Enter the facility through the Main Entrance of the hospital.  On the day of surgery, please provide the cell phone number of the person who will be waiting for you to the Patient Access representative at the time of

## 2024-01-24 LAB
APPEARANCE UR: CLEAR
BACTERIA SPEC CULT: NORMAL
BACTERIA SPEC CULT: NORMAL
BACTERIA URNS QL MICRO: ABNORMAL /HPF
BILIRUB UR QL: NEGATIVE
CAOX CRY URNS QL MICRO: ABNORMAL
COLOR UR: ABNORMAL
EPITH CASTS URNS QL MICRO: ABNORMAL /LPF
GLUCOSE UR STRIP.AUTO-MCNC: NEGATIVE MG/DL
HGB UR QL STRIP: NEGATIVE
KETONES UR QL STRIP.AUTO: NEGATIVE MG/DL
LEUKOCYTE ESTERASE UR QL STRIP.AUTO: NEGATIVE
NITRITE UR QL STRIP.AUTO: NEGATIVE
PH UR STRIP: 5.5 (ref 5–8)
PROT UR STRIP-MCNC: NEGATIVE MG/DL
RBC #/AREA URNS HPF: ABNORMAL /HPF (ref 0–5)
SERVICE CMNT-IMP: NORMAL
SP GR UR REFRACTOMETRY: 1.01 (ref 1–1.03)
URINE CULTURE IF INDICATED: ABNORMAL
UROBILINOGEN UR QL STRIP.AUTO: 1 EU/DL (ref 0.2–1)
WBC URNS QL MICRO: ABNORMAL /HPF (ref 0–4)

## 2024-01-24 NOTE — PROGRESS NOTES
tablets by mouth daily GUMMIES     No current facility-administered medications for this encounter.      ____________________________________________  ALLERGIES  Allergies   Allergen Reactions    Levofloxacin Anaphylaxis     IV ONLY    Sulfamethoxazole-Trimethoprim Itching and Rash    Morphine Other (See Comments)     PT STATED DOES NOT WORK ON HER      ____________________________________________  SOCIAL HISTORY  Social History     Tobacco Use    Smoking status: Former     Current packs/day: 0.00     Average packs/day: 0.5 packs/day for 3.0 years (1.5 ttl pk-yrs)     Types: Cigarettes     Start date: 1985     Quit date: 1988     Years since quittin.9    Smokeless tobacco: Never   Substance Use Topics    Alcohol use: No      ____________________________________________   Internal Administration   First Dose      Second Dose           Last COVID Lab POC Glucose (mg/dL)   Date Value   2023 88          ____________________________________________    Labs:     Hospital Outpatient Visit on 2024   Component Date Value Ref Range Status    Hemoglobin A1C 2024 5.1  4.0 - 5.6 % Final    Comment: (NOTE)  HbA1C Interpretive Ranges  <5.7              Normal  5.7 - 6.4         Consider Prediabetes  >6.5              Consider Diabetes      Estimated Avg Glucose 2024 100  mg/dL Final    Sodium 2024 136  136 - 145 mmol/L Final    Potassium 2024 4.3  3.5 - 5.1 mmol/L Final    Chloride 2024 103  97 - 108 mmol/L Final    CO2 2024 29  21 - 32 mmol/L Final    Anion Gap 2024 4 (L)  5 - 15 mmol/L Final    Glucose 2024 90  65 - 100 mg/dL Final    BUN 2024 8  6 - 20 MG/DL Final    Creatinine 2024 0.76  0.55 - 1.02 MG/DL Final    Bun/Cre Ratio 2024 11 (L)  12 - 20   Final    Est, Glom Filt Rate 2024 >60  >60 ml/min/1.73m2 Final    Comment:    Pediatric calculator link: https://www.kidney.org/professionals/kdoqi/gfr_calculatorped     These

## 2024-02-06 PROBLEM — M48.062 SPINAL STENOSIS, LUMBAR REGION, WITH NEUROGENIC CLAUDICATION: Status: ACTIVE | Noted: 2024-02-06

## 2024-02-13 NOTE — H&P
Right 2010     BONE GRAFT SCAFOID BONE WRIST    ORTHOPEDIC SURGERY Right 2015     MUSCLE BIOPSY -[facility] MUSCULAR DYSTROPHY     OTHER SURGICAL HISTORY        PAIN STIMULATOR    OTHER SURGICAL HISTORY   2015     MUSCLE BIOPSY     OTHER SURGICAL HISTORY   2019     BYPASS REVISION HIATAL HERNIA FIXED    RHINOPLASTY   1982    RHINOPLASTY   1991    STIMULATOR SURGERY N/A 2023     DCS BATTERY EXCHANGE performed by Bhavin Pelayo MD at [facility] MAIN OR    TONSILLECTOMY   1982     AND NOSE RECONSTRUCTION    TOTAL HIP ARTHROPLASTY Left 2016    TOTAL HIP ARTHROPLASTY Right 2019    UROLOGICAL SURGERY   2018     CYSTOSCOPY WITH BLADDER BIOPSY     UROLOGICAL SURGERY   2018     KIDNEY STONE REMOVAL                   Family History   Problem Relation Age of Onset    Heart Disease Mother      Asthma Mother      Diabetes Mother      COPD Mother      Anesth Problems Mother           SEVERE PONV    Heart Failure Mother      High Blood Pressure Mother      Pacemaker Mother      Cancer Father           BREAST    Cancer Sister           MELANOMA    Cancer Sister           BREAST    Heart Attack Brother      Heart Disease Brother      Anesth Problems Brother           VOMITTING    Alcohol Abuse Brother      Asthma Brother      Cancer Paternal Grandmother           KIDNEY         Social History                Tobacco Use    Smoking status: Former       Packs/day: 0.50       Years: 3.00       Additional pack years: 0.00       Total pack years: 1.50       Types: Cigarettes       Quit date: 1988       Years since quittin.8    Smokeless tobacco: Never   Vaping Use    Vaping Use: Never used   Substance Use Topics    Alcohol use: No    Drug use: Not Currently         Review of Systems   Constitutional:  Negative for chills and fever.   Musculoskeletal:  Positive for back pain.   Neurological:  Positive for weakness.            Vitals:  There were no vitals filed for this visit.     There is no height or weight

## 2024-02-19 ENCOUNTER — ANESTHESIA EVENT (OUTPATIENT)
Facility: HOSPITAL | Age: 59
DRG: 455 | End: 2024-02-19
Payer: COMMERCIAL

## 2024-02-19 ENCOUNTER — HOSPITAL ENCOUNTER (INPATIENT)
Facility: HOSPITAL | Age: 59
LOS: 1 days | Discharge: HOME | DRG: 455 | End: 2024-02-20
Attending: ORTHOPAEDIC SURGERY | Admitting: ORTHOPAEDIC SURGERY
Payer: COMMERCIAL

## 2024-02-19 ENCOUNTER — APPOINTMENT (OUTPATIENT)
Facility: HOSPITAL | Age: 59
DRG: 455 | End: 2024-02-19
Attending: ORTHOPAEDIC SURGERY
Payer: COMMERCIAL

## 2024-02-19 ENCOUNTER — ANESTHESIA (OUTPATIENT)
Facility: HOSPITAL | Age: 59
DRG: 455 | End: 2024-02-19
Payer: COMMERCIAL

## 2024-02-19 DIAGNOSIS — Z98.1 STATUS POST LUMBAR SPINAL FUSION: Primary | ICD-10-CM

## 2024-02-19 PROBLEM — M48.062 LUMBAR STENOSIS WITH NEUROGENIC CLAUDICATION: Status: ACTIVE | Noted: 2024-02-19

## 2024-02-19 LAB
ABO + RH BLD: NORMAL
BLOOD GROUP ANTIBODIES SERPL: NORMAL
GLUCOSE BLD STRIP.AUTO-MCNC: 78 MG/DL (ref 65–117)
SERVICE CMNT-IMP: NORMAL
SPECIMEN EXP DATE BLD: NORMAL

## 2024-02-19 PROCEDURE — 6360000002 HC RX W HCPCS: Performed by: ORTHOPAEDIC SURGERY

## 2024-02-19 PROCEDURE — 22842 INSERT SPINE FIXATION DEVICE: CPT | Performed by: STUDENT IN AN ORGANIZED HEALTH CARE EDUCATION/TRAINING PROGRAM

## 2024-02-19 PROCEDURE — 0SB20ZZ EXCISION OF LUMBAR VERTEBRAL DISC, OPEN APPROACH: ICD-10-PCS | Performed by: ORTHOPAEDIC SURGERY

## 2024-02-19 PROCEDURE — 2500000003 HC RX 250 WO HCPCS: Performed by: ORTHOPAEDIC SURGERY

## 2024-02-19 PROCEDURE — 63052 LAM FACETC/FRMT ARTHRD LUM 1: CPT | Performed by: ORTHOPAEDIC SURGERY

## 2024-02-19 PROCEDURE — 6360000002 HC RX W HCPCS: Performed by: STUDENT IN AN ORGANIZED HEALTH CARE EDUCATION/TRAINING PROGRAM

## 2024-02-19 PROCEDURE — 01NB0ZZ RELEASE LUMBAR NERVE, OPEN APPROACH: ICD-10-PCS | Performed by: ORTHOPAEDIC SURGERY

## 2024-02-19 PROCEDURE — 36415 COLL VENOUS BLD VENIPUNCTURE: CPT

## 2024-02-19 PROCEDURE — 6360000002 HC RX W HCPCS: Performed by: NURSE ANESTHETIST, CERTIFIED REGISTERED

## 2024-02-19 PROCEDURE — 2780000010 HC IMPLANT OTHER: Performed by: ORTHOPAEDIC SURGERY

## 2024-02-19 PROCEDURE — 6370000000 HC RX 637 (ALT 250 FOR IP): Performed by: STUDENT IN AN ORGANIZED HEALTH CARE EDUCATION/TRAINING PROGRAM

## 2024-02-19 PROCEDURE — 2580000003 HC RX 258: Performed by: PHYSICIAN ASSISTANT

## 2024-02-19 PROCEDURE — 7100000000 HC PACU RECOVERY - FIRST 15 MIN: Performed by: ORTHOPAEDIC SURGERY

## 2024-02-19 PROCEDURE — 22633 ARTHRD CMBN 1NTRSPC LUMBAR: CPT | Performed by: STUDENT IN AN ORGANIZED HEALTH CARE EDUCATION/TRAINING PROGRAM

## 2024-02-19 PROCEDURE — 22842 INSERT SPINE FIXATION DEVICE: CPT | Performed by: ORTHOPAEDIC SURGERY

## 2024-02-19 PROCEDURE — 63053 LAM FACTC/FRMT ARTHRD LUM EA: CPT | Performed by: STUDENT IN AN ORGANIZED HEALTH CARE EDUCATION/TRAINING PROGRAM

## 2024-02-19 PROCEDURE — 3700000000 HC ANESTHESIA ATTENDED CARE: Performed by: ORTHOPAEDIC SURGERY

## 2024-02-19 PROCEDURE — 2500000003 HC RX 250 WO HCPCS: Performed by: NURSE ANESTHETIST, CERTIFIED REGISTERED

## 2024-02-19 PROCEDURE — 2580000003 HC RX 258: Performed by: STUDENT IN AN ORGANIZED HEALTH CARE EDUCATION/TRAINING PROGRAM

## 2024-02-19 PROCEDURE — 3E0U0GB INTRODUCTION OF RECOMBINANT BONE MORPHOGENETIC PROTEIN INTO JOINTS, OPEN APPROACH: ICD-10-PCS | Performed by: ORTHOPAEDIC SURGERY

## 2024-02-19 PROCEDURE — 6370000000 HC RX 637 (ALT 250 FOR IP): Performed by: PHYSICIAN ASSISTANT

## 2024-02-19 PROCEDURE — 2709999900 HC NON-CHARGEABLE SUPPLY: Performed by: ORTHOPAEDIC SURGERY

## 2024-02-19 PROCEDURE — 3600000014 HC SURGERY LEVEL 4 ADDTL 15MIN: Performed by: ORTHOPAEDIC SURGERY

## 2024-02-19 PROCEDURE — 82962 GLUCOSE BLOOD TEST: CPT

## 2024-02-19 PROCEDURE — 4A11X4G MONITORING OF PERIPHERAL NERVOUS ELECTRICAL ACTIVITY, INTRAOPERATIVE, EXTERNAL APPROACH: ICD-10-PCS | Performed by: ORTHOPAEDIC SURGERY

## 2024-02-19 PROCEDURE — 3700000001 HC ADD 15 MINUTES (ANESTHESIA): Performed by: ORTHOPAEDIC SURGERY

## 2024-02-19 PROCEDURE — 86900 BLOOD TYPING SEROLOGIC ABO: CPT

## 2024-02-19 PROCEDURE — C1889 IMPLANT/INSERT DEVICE, NOC: HCPCS | Performed by: ORTHOPAEDIC SURGERY

## 2024-02-19 PROCEDURE — 0SG30K1 FUSION OF LUMBOSACRAL JOINT WITH NONAUTOLOGOUS TISSUE SUBSTITUTE, POSTERIOR APPROACH, POSTERIOR COLUMN, OPEN APPROACH: ICD-10-PCS | Performed by: ORTHOPAEDIC SURGERY

## 2024-02-19 PROCEDURE — 86850 RBC ANTIBODY SCREEN: CPT

## 2024-02-19 PROCEDURE — 86901 BLOOD TYPING SEROLOGIC RH(D): CPT

## 2024-02-19 PROCEDURE — 0QP004Z REMOVAL OF INTERNAL FIXATION DEVICE FROM LUMBAR VERTEBRA, OPEN APPROACH: ICD-10-PCS | Performed by: ORTHOPAEDIC SURGERY

## 2024-02-19 PROCEDURE — 3600000004 HC SURGERY LEVEL 4 BASE: Performed by: ORTHOPAEDIC SURGERY

## 2024-02-19 PROCEDURE — 2580000003 HC RX 258: Performed by: NURSE ANESTHETIST, CERTIFIED REGISTERED

## 2024-02-19 PROCEDURE — 22853 INSJ BIOMECHANICAL DEVICE: CPT | Performed by: STUDENT IN AN ORGANIZED HEALTH CARE EDUCATION/TRAINING PROGRAM

## 2024-02-19 PROCEDURE — 6370000000 HC RX 637 (ALT 250 FOR IP): Performed by: ORTHOPAEDIC SURGERY

## 2024-02-19 PROCEDURE — C1713 ANCHOR/SCREW BN/BN,TIS/BN: HCPCS | Performed by: ORTHOPAEDIC SURGERY

## 2024-02-19 PROCEDURE — 63052 LAM FACETC/FRMT ARTHRD LUM 1: CPT | Performed by: STUDENT IN AN ORGANIZED HEALTH CARE EDUCATION/TRAINING PROGRAM

## 2024-02-19 PROCEDURE — 0SG00AJ FUSION OF LUMBAR VERTEBRAL JOINT WITH INTERBODY FUSION DEVICE, POSTERIOR APPROACH, ANTERIOR COLUMN, OPEN APPROACH: ICD-10-PCS | Performed by: ORTHOPAEDIC SURGERY

## 2024-02-19 PROCEDURE — 6360000002 HC RX W HCPCS: Performed by: PHYSICIAN ASSISTANT

## 2024-02-19 PROCEDURE — 1100000000 HC RM PRIVATE

## 2024-02-19 PROCEDURE — 22853 INSJ BIOMECHANICAL DEVICE: CPT | Performed by: ORTHOPAEDIC SURGERY

## 2024-02-19 PROCEDURE — 22633 ARTHRD CMBN 1NTRSPC LUMBAR: CPT | Performed by: ORTHOPAEDIC SURGERY

## 2024-02-19 PROCEDURE — 0SG00K1 FUSION OF LUMBAR VERTEBRAL JOINT WITH NONAUTOLOGOUS TISSUE SUBSTITUTE, POSTERIOR APPROACH, POSTERIOR COLUMN, OPEN APPROACH: ICD-10-PCS | Performed by: ORTHOPAEDIC SURGERY

## 2024-02-19 PROCEDURE — 2720000010 HC SURG SUPPLY STERILE: Performed by: ORTHOPAEDIC SURGERY

## 2024-02-19 PROCEDURE — 7100000001 HC PACU RECOVERY - ADDTL 15 MIN: Performed by: ORTHOPAEDIC SURGERY

## 2024-02-19 PROCEDURE — 63053 LAM FACTC/FRMT ARTHRD LUM EA: CPT | Performed by: ORTHOPAEDIC SURGERY

## 2024-02-19 DEVICE — GRAFT BNE SUB M W20XH7XL30MM COMPRESSIBLE SPNG STRP PROVIDE: Type: IMPLANTABLE DEVICE | Site: SPINE LUMBAR | Status: FUNCTIONAL

## 2024-02-19 DEVICE — SCREW 55840006545 5.5/6 MAS 6.5X45 CC .
Type: IMPLANTABLE DEVICE | Site: SPINE LUMBAR | Status: FUNCTIONAL
Brand: CD HORIZON® SPINAL SYSTEM

## 2024-02-19 DEVICE — BONE GRAFT KIT 7510100 INFUSE X SMALL
Type: IMPLANTABLE DEVICE | Site: SPINE LUMBAR | Status: FUNCTIONAL
Brand: INFUSE® BONE GRAFT

## 2024-02-19 DEVICE — GRAFT BNE FIBER 15 CC OSTEOAMP SEL: Type: IMPLANTABLE DEVICE | Site: SPINE LUMBAR | Status: FUNCTIONAL

## 2024-02-19 DEVICE — SPACER 6068073 CATALYFT PL SHORT 7MM
Type: IMPLANTABLE DEVICE | Site: SPINE LUMBAR | Status: FUNCTIONAL
Brand: CATALYFT PL EXPANDABLE INTERBODY SYSTEM

## 2024-02-19 RX ORDER — SODIUM CHLORIDE 9 MG/ML
INJECTION, SOLUTION INTRAVENOUS PRN
Status: DISCONTINUED | OUTPATIENT
Start: 2024-02-19 | End: 2024-02-19 | Stop reason: HOSPADM

## 2024-02-19 RX ORDER — MIDAZOLAM HYDROCHLORIDE 2 MG/2ML
2 INJECTION, SOLUTION INTRAMUSCULAR; INTRAVENOUS
Status: DISCONTINUED | OUTPATIENT
Start: 2024-02-19 | End: 2024-02-19 | Stop reason: HOSPADM

## 2024-02-19 RX ORDER — HYDROMORPHONE HYDROCHLORIDE 4 MG/1
2 TABLET ORAL EVERY 4 HOURS PRN
Status: DISCONTINUED | OUTPATIENT
Start: 2024-02-19 | End: 2024-02-20 | Stop reason: HOSPADM

## 2024-02-19 RX ORDER — LANOLIN ALCOHOL/MO/W.PET/CERES
325 CREAM (GRAM) TOPICAL DAILY
Status: DISCONTINUED | OUTPATIENT
Start: 2024-02-19 | End: 2024-02-19 | Stop reason: RX

## 2024-02-19 RX ORDER — ALBUTEROL SULFATE 2.5 MG/3ML
2.5 SOLUTION RESPIRATORY (INHALATION) EVERY 4 HOURS PRN
Status: DISCONTINUED | OUTPATIENT
Start: 2024-02-19 | End: 2024-02-19 | Stop reason: SDUPTHER

## 2024-02-19 RX ORDER — GABAPENTIN 300 MG/1
300 CAPSULE ORAL DAILY
Status: DISCONTINUED | OUTPATIENT
Start: 2024-02-20 | End: 2024-02-20

## 2024-02-19 RX ORDER — SODIUM CHLORIDE 0.9 % (FLUSH) 0.9 %
5-40 SYRINGE (ML) INJECTION EVERY 12 HOURS SCHEDULED
Status: DISCONTINUED | OUTPATIENT
Start: 2024-02-19 | End: 2024-02-19 | Stop reason: HOSPADM

## 2024-02-19 RX ORDER — SODIUM CHLORIDE 0.9 % (FLUSH) 0.9 %
5-40 SYRINGE (ML) INJECTION PRN
Status: DISCONTINUED | OUTPATIENT
Start: 2024-02-19 | End: 2024-02-20 | Stop reason: HOSPADM

## 2024-02-19 RX ORDER — ONDANSETRON 2 MG/ML
INJECTION INTRAMUSCULAR; INTRAVENOUS PRN
Status: DISCONTINUED | OUTPATIENT
Start: 2024-02-19 | End: 2024-02-19 | Stop reason: SDUPTHER

## 2024-02-19 RX ORDER — SODIUM CHLORIDE 9 MG/ML
INJECTION, SOLUTION INTRAVENOUS PRN
Status: DISCONTINUED | OUTPATIENT
Start: 2024-02-19 | End: 2024-02-20 | Stop reason: HOSPADM

## 2024-02-19 RX ORDER — ONDANSETRON 2 MG/ML
4 INJECTION INTRAMUSCULAR; INTRAVENOUS
Status: DISCONTINUED | OUTPATIENT
Start: 2024-02-19 | End: 2024-02-19 | Stop reason: HOSPADM

## 2024-02-19 RX ORDER — SCOLOPAMINE TRANSDERMAL SYSTEM 1 MG/1
1 PATCH, EXTENDED RELEASE TRANSDERMAL ONCE
Status: DISCONTINUED | OUTPATIENT
Start: 2024-02-19 | End: 2024-02-20 | Stop reason: HOSPADM

## 2024-02-19 RX ORDER — HYDROMORPHONE HYDROCHLORIDE 4 MG/1
4 TABLET ORAL EVERY 4 HOURS PRN
Status: DISCONTINUED | OUTPATIENT
Start: 2024-02-19 | End: 2024-02-20 | Stop reason: HOSPADM

## 2024-02-19 RX ORDER — KETOROLAC TROMETHAMINE 30 MG/ML
30 INJECTION, SOLUTION INTRAMUSCULAR; INTRAVENOUS EVERY 6 HOURS
Status: DISCONTINUED | OUTPATIENT
Start: 2024-02-20 | End: 2024-02-20 | Stop reason: HOSPADM

## 2024-02-19 RX ORDER — MULTIVITAMIN WITH IRON
1 TABLET ORAL DAILY
Status: DISCONTINUED | OUTPATIENT
Start: 2024-02-19 | End: 2024-02-20 | Stop reason: HOSPADM

## 2024-02-19 RX ORDER — MIDAZOLAM HYDROCHLORIDE 1 MG/ML
INJECTION INTRAMUSCULAR; INTRAVENOUS PRN
Status: DISCONTINUED | OUTPATIENT
Start: 2024-02-19 | End: 2024-02-19 | Stop reason: SDUPTHER

## 2024-02-19 RX ORDER — DIPHENHYDRAMINE HYDROCHLORIDE 50 MG/ML
25 INJECTION INTRAMUSCULAR; INTRAVENOUS EVERY 6 HOURS PRN
Status: DISCONTINUED | OUTPATIENT
Start: 2024-02-19 | End: 2024-02-20 | Stop reason: HOSPADM

## 2024-02-19 RX ORDER — DULOXETIN HYDROCHLORIDE 60 MG/1
120 CAPSULE, DELAYED RELEASE ORAL DAILY
Status: DISCONTINUED | OUTPATIENT
Start: 2024-02-20 | End: 2024-02-20 | Stop reason: HOSPADM

## 2024-02-19 RX ORDER — IPRATROPIUM BROMIDE AND ALBUTEROL SULFATE 2.5; .5 MG/3ML; MG/3ML
1 SOLUTION RESPIRATORY (INHALATION) EVERY 6 HOURS PRN
Status: DISCONTINUED | OUTPATIENT
Start: 2024-02-19 | End: 2024-02-20 | Stop reason: HOSPADM

## 2024-02-19 RX ORDER — GUAIFENESIN 600 MG/1
1200 TABLET, EXTENDED RELEASE ORAL 2 TIMES DAILY
Status: DISCONTINUED | OUTPATIENT
Start: 2024-02-19 | End: 2024-02-20 | Stop reason: HOSPADM

## 2024-02-19 RX ORDER — OXYCODONE HYDROCHLORIDE 5 MG/1
5 TABLET ORAL
Status: DISCONTINUED | OUTPATIENT
Start: 2024-02-19 | End: 2024-02-19 | Stop reason: HOSPADM

## 2024-02-19 RX ORDER — FENTANYL CITRATE 50 UG/ML
100 INJECTION, SOLUTION INTRAMUSCULAR; INTRAVENOUS
Status: DISCONTINUED | OUTPATIENT
Start: 2024-02-19 | End: 2024-02-19 | Stop reason: HOSPADM

## 2024-02-19 RX ORDER — NALOXONE HYDROCHLORIDE 0.4 MG/ML
INJECTION, SOLUTION INTRAMUSCULAR; INTRAVENOUS; SUBCUTANEOUS PRN
Status: DISCONTINUED | OUTPATIENT
Start: 2024-02-19 | End: 2024-02-20 | Stop reason: HOSPADM

## 2024-02-19 RX ORDER — PROCHLORPERAZINE MALEATE 10 MG
10 TABLET ORAL EVERY 6 HOURS PRN
Status: DISCONTINUED | OUTPATIENT
Start: 2024-02-19 | End: 2024-02-20 | Stop reason: HOSPADM

## 2024-02-19 RX ORDER — SODIUM CHLORIDE, SODIUM LACTATE, POTASSIUM CHLORIDE, CALCIUM CHLORIDE 600; 310; 30; 20 MG/100ML; MG/100ML; MG/100ML; MG/100ML
INJECTION, SOLUTION INTRAVENOUS CONTINUOUS
Status: DISCONTINUED | OUTPATIENT
Start: 2024-02-19 | End: 2024-02-19 | Stop reason: HOSPADM

## 2024-02-19 RX ORDER — PROCHLORPERAZINE EDISYLATE 5 MG/ML
10 INJECTION INTRAMUSCULAR; INTRAVENOUS EVERY 6 HOURS PRN
Status: DISCONTINUED | OUTPATIENT
Start: 2024-02-19 | End: 2024-02-20 | Stop reason: HOSPADM

## 2024-02-19 RX ORDER — MONTELUKAST SODIUM 10 MG/1
10 TABLET ORAL NIGHTLY
Status: DISCONTINUED | OUTPATIENT
Start: 2024-02-19 | End: 2024-02-20 | Stop reason: HOSPADM

## 2024-02-19 RX ORDER — ACETAMINOPHEN 500 MG
1000 TABLET ORAL EVERY 6 HOURS
Status: DISCONTINUED | OUTPATIENT
Start: 2024-02-19 | End: 2024-02-20 | Stop reason: HOSPADM

## 2024-02-19 RX ORDER — FAMOTIDINE 20 MG/1
20 TABLET, FILM COATED ORAL 2 TIMES DAILY
Status: DISCONTINUED | OUTPATIENT
Start: 2024-02-19 | End: 2024-02-20 | Stop reason: HOSPADM

## 2024-02-19 RX ORDER — ACETAMINOPHEN 500 MG
1000 TABLET ORAL ONCE
Status: DISCONTINUED | OUTPATIENT
Start: 2024-02-19 | End: 2024-02-19 | Stop reason: SDUPTHER

## 2024-02-19 RX ORDER — CETIRIZINE HYDROCHLORIDE 10 MG/1
10 TABLET ORAL NIGHTLY
Status: DISCONTINUED | OUTPATIENT
Start: 2024-02-19 | End: 2024-02-20 | Stop reason: HOSPADM

## 2024-02-19 RX ORDER — ROCURONIUM BROMIDE 10 MG/ML
INJECTION, SOLUTION INTRAVENOUS PRN
Status: DISCONTINUED | OUTPATIENT
Start: 2024-02-19 | End: 2024-02-19 | Stop reason: SDUPTHER

## 2024-02-19 RX ORDER — CHOLECALCIFEROL (VITAMIN D3) 125 MCG
1000 CAPSULE ORAL DAILY
Status: DISCONTINUED | OUTPATIENT
Start: 2024-02-19 | End: 2024-02-20 | Stop reason: HOSPADM

## 2024-02-19 RX ORDER — HYDROMORPHONE HYDROCHLORIDE 1 MG/ML
0.5 INJECTION, SOLUTION INTRAMUSCULAR; INTRAVENOUS; SUBCUTANEOUS EVERY 5 MIN PRN
Status: DISCONTINUED | OUTPATIENT
Start: 2024-02-19 | End: 2024-02-19 | Stop reason: HOSPADM

## 2024-02-19 RX ORDER — PREGABALIN 75 MG/1
75 CAPSULE ORAL ONCE
Status: COMPLETED | OUTPATIENT
Start: 2024-02-19 | End: 2024-02-19

## 2024-02-19 RX ORDER — LIDOCAINE HYDROCHLORIDE 20 MG/ML
INJECTION, SOLUTION EPIDURAL; INFILTRATION; INTRACAUDAL; PERINEURAL PRN
Status: DISCONTINUED | OUTPATIENT
Start: 2024-02-19 | End: 2024-02-19 | Stop reason: SDUPTHER

## 2024-02-19 RX ORDER — ACETAMINOPHEN 500 MG
1000 TABLET ORAL ONCE
Status: COMPLETED | OUTPATIENT
Start: 2024-02-19 | End: 2024-02-19

## 2024-02-19 RX ORDER — DIPHENHYDRAMINE HCL 25 MG
25 CAPSULE ORAL EVERY 6 HOURS PRN
Status: DISCONTINUED | OUTPATIENT
Start: 2024-02-19 | End: 2024-02-20 | Stop reason: HOSPADM

## 2024-02-19 RX ORDER — SODIUM CHLORIDE 0.9 % (FLUSH) 0.9 %
5-40 SYRINGE (ML) INJECTION EVERY 12 HOURS SCHEDULED
Status: DISCONTINUED | OUTPATIENT
Start: 2024-02-19 | End: 2024-02-20 | Stop reason: HOSPADM

## 2024-02-19 RX ORDER — SODIUM CHLORIDE 0.9 % (FLUSH) 0.9 %
5-40 SYRINGE (ML) INJECTION PRN
Status: DISCONTINUED | OUTPATIENT
Start: 2024-02-19 | End: 2024-02-19 | Stop reason: HOSPADM

## 2024-02-19 RX ORDER — VANCOMYCIN HYDROCHLORIDE 1 G/20ML
INJECTION, POWDER, LYOPHILIZED, FOR SOLUTION INTRAVENOUS PRN
Status: DISCONTINUED | OUTPATIENT
Start: 2024-02-19 | End: 2024-02-19 | Stop reason: HOSPADM

## 2024-02-19 RX ORDER — FERROUS SULFATE 325(65) MG
325 TABLET ORAL DAILY
Status: DISCONTINUED | OUTPATIENT
Start: 2024-02-19 | End: 2024-02-20 | Stop reason: HOSPADM

## 2024-02-19 RX ORDER — LIDOCAINE HYDROCHLORIDE 10 MG/ML
1 INJECTION, SOLUTION EPIDURAL; INFILTRATION; INTRACAUDAL; PERINEURAL
Status: DISCONTINUED | OUTPATIENT
Start: 2024-02-19 | End: 2024-02-19 | Stop reason: HOSPADM

## 2024-02-19 RX ORDER — KETAMINE HCL IN NACL, ISO-OSM 100MG/10ML
SYRINGE (ML) INJECTION PRN
Status: DISCONTINUED | OUTPATIENT
Start: 2024-02-19 | End: 2024-02-19 | Stop reason: SDUPTHER

## 2024-02-19 RX ORDER — CYCLOBENZAPRINE HCL 10 MG
10 TABLET ORAL EVERY 12 HOURS PRN
Status: DISCONTINUED | OUTPATIENT
Start: 2024-02-19 | End: 2024-02-20 | Stop reason: HOSPADM

## 2024-02-19 RX ORDER — SENNA AND DOCUSATE SODIUM 50; 8.6 MG/1; MG/1
1 TABLET, FILM COATED ORAL 2 TIMES DAILY
Status: DISCONTINUED | OUTPATIENT
Start: 2024-02-19 | End: 2024-02-20 | Stop reason: HOSPADM

## 2024-02-19 RX ORDER — SODIUM CHLORIDE 9 MG/ML
INJECTION, SOLUTION INTRAVENOUS CONTINUOUS
Status: DISCONTINUED | OUTPATIENT
Start: 2024-02-19 | End: 2024-02-20

## 2024-02-19 RX ORDER — DEXAMETHASONE SODIUM PHOSPHATE 4 MG/ML
INJECTION, SOLUTION INTRA-ARTICULAR; INTRALESIONAL; INTRAMUSCULAR; INTRAVENOUS; SOFT TISSUE PRN
Status: DISCONTINUED | OUTPATIENT
Start: 2024-02-19 | End: 2024-02-19 | Stop reason: SDUPTHER

## 2024-02-19 RX ORDER — CLONIDINE HYDROCHLORIDE 0.1 MG/1
0.1 TABLET ORAL 3 TIMES DAILY
Status: DISCONTINUED | OUTPATIENT
Start: 2024-02-19 | End: 2024-02-20 | Stop reason: HOSPADM

## 2024-02-19 RX ORDER — PROCHLORPERAZINE EDISYLATE 5 MG/ML
5 INJECTION INTRAMUSCULAR; INTRAVENOUS
Status: DISCONTINUED | OUTPATIENT
Start: 2024-02-19 | End: 2024-02-19 | Stop reason: HOSPADM

## 2024-02-19 RX ORDER — FENTANYL CITRATE 50 UG/ML
25 INJECTION, SOLUTION INTRAMUSCULAR; INTRAVENOUS EVERY 5 MIN PRN
Status: COMPLETED | OUTPATIENT
Start: 2024-02-19 | End: 2024-02-19

## 2024-02-19 RX ORDER — POLYETHYLENE GLYCOL 3350 17 G/17G
17 POWDER, FOR SOLUTION ORAL DAILY
Status: DISCONTINUED | OUTPATIENT
Start: 2024-02-19 | End: 2024-02-20 | Stop reason: HOSPADM

## 2024-02-19 RX ORDER — FENTANYL CITRATE 50 UG/ML
INJECTION, SOLUTION INTRAMUSCULAR; INTRAVENOUS PRN
Status: DISCONTINUED | OUTPATIENT
Start: 2024-02-19 | End: 2024-02-19 | Stop reason: SDUPTHER

## 2024-02-19 RX ORDER — HYDROMORPHONE HYDROCHLORIDE 1 MG/ML
0.5 INJECTION, SOLUTION INTRAMUSCULAR; INTRAVENOUS; SUBCUTANEOUS
Status: DISCONTINUED | OUTPATIENT
Start: 2024-02-19 | End: 2024-02-20 | Stop reason: HOSPADM

## 2024-02-19 RX ORDER — UBIDECARENONE 100 MG
400 CAPSULE ORAL DAILY
Status: DISCONTINUED | OUTPATIENT
Start: 2024-02-19 | End: 2024-02-19

## 2024-02-19 RX ORDER — HYDRALAZINE HYDROCHLORIDE 20 MG/ML
10 INJECTION INTRAMUSCULAR; INTRAVENOUS
Status: DISCONTINUED | OUTPATIENT
Start: 2024-02-19 | End: 2024-02-19 | Stop reason: HOSPADM

## 2024-02-19 RX ORDER — CELECOXIB 200 MG/1
200 CAPSULE ORAL ONCE
Status: COMPLETED | OUTPATIENT
Start: 2024-02-19 | End: 2024-02-19

## 2024-02-19 RX ORDER — BUPROPION HYDROCHLORIDE 150 MG/1
150 TABLET, EXTENDED RELEASE ORAL EVERY MORNING
Status: DISCONTINUED | OUTPATIENT
Start: 2024-02-20 | End: 2024-02-20 | Stop reason: HOSPADM

## 2024-02-19 RX ORDER — DIAZEPAM 5 MG/ML
5 INJECTION, SOLUTION INTRAMUSCULAR; INTRAVENOUS ONCE
Status: COMPLETED | OUTPATIENT
Start: 2024-02-19 | End: 2024-02-19

## 2024-02-19 RX ADMIN — PROPOFOL 100 MCG/KG/MIN: 10 INJECTION, EMULSION INTRAVENOUS at 13:05

## 2024-02-19 RX ADMIN — FENTANYL CITRATE 25 MCG: 50 INJECTION INTRAMUSCULAR; INTRAVENOUS at 16:10

## 2024-02-19 RX ADMIN — SODIUM CHLORIDE: 9 INJECTION, SOLUTION INTRAVENOUS at 15:02

## 2024-02-19 RX ADMIN — DEXAMETHASONE SODIUM PHOSPHATE 8 MG: 4 INJECTION, SOLUTION INTRAMUSCULAR; INTRAVENOUS at 13:20

## 2024-02-19 RX ADMIN — DIAZEPAM 5 MG: 5 INJECTION, SOLUTION INTRAMUSCULAR; INTRAVENOUS at 16:20

## 2024-02-19 RX ADMIN — PHENYLEPHRINE HYDROCHLORIDE 80 MCG: 10 INJECTION INTRAVENOUS at 13:18

## 2024-02-19 RX ADMIN — PHENYLEPHRINE HYDROCHLORIDE 80 MCG: 10 INJECTION INTRAVENOUS at 14:19

## 2024-02-19 RX ADMIN — SODIUM CHLORIDE, POTASSIUM CHLORIDE, SODIUM LACTATE AND CALCIUM CHLORIDE: 600; 310; 30; 20 INJECTION, SOLUTION INTRAVENOUS at 11:21

## 2024-02-19 RX ADMIN — Medication 30 MG: at 13:20

## 2024-02-19 RX ADMIN — FENTANYL CITRATE 25 MCG: 50 INJECTION INTRAMUSCULAR; INTRAVENOUS at 16:05

## 2024-02-19 RX ADMIN — SODIUM CHLORIDE 125 ML/HR: 9 INJECTION, SOLUTION INTRAVENOUS at 16:11

## 2024-02-19 RX ADMIN — SENNOSIDES AND DOCUSATE SODIUM 1 TABLET: 8.6; 5 TABLET ORAL at 20:37

## 2024-02-19 RX ADMIN — ONDANSETRON 4 MG: 2 INJECTION INTRAMUSCULAR; INTRAVENOUS at 15:00

## 2024-02-19 RX ADMIN — FENTANYL CITRATE 50 MCG: 50 INJECTION, SOLUTION INTRAMUSCULAR; INTRAVENOUS at 13:38

## 2024-02-19 RX ADMIN — Medication: at 15:57

## 2024-02-19 RX ADMIN — FENTANYL CITRATE 25 MCG: 50 INJECTION INTRAMUSCULAR; INTRAVENOUS at 15:57

## 2024-02-19 RX ADMIN — PREGABALIN 75 MG: 75 CAPSULE ORAL at 11:22

## 2024-02-19 RX ADMIN — WATER 2000 MG: 1 INJECTION INTRAMUSCULAR; INTRAVENOUS; SUBCUTANEOUS at 13:20

## 2024-02-19 RX ADMIN — FENTANYL CITRATE 50 MCG: 50 INJECTION, SOLUTION INTRAMUSCULAR; INTRAVENOUS at 13:03

## 2024-02-19 RX ADMIN — ACETAMINOPHEN 1000 MG: 500 TABLET ORAL at 11:22

## 2024-02-19 RX ADMIN — WATER 2000 MG: 1 INJECTION INTRAMUSCULAR; INTRAVENOUS; SUBCUTANEOUS at 20:37

## 2024-02-19 RX ADMIN — HYDROMORPHONE HYDROCHLORIDE 4 MG: 4 TABLET ORAL at 18:14

## 2024-02-19 RX ADMIN — CELECOXIB 200 MG: 200 CAPSULE ORAL at 11:22

## 2024-02-19 RX ADMIN — PHENYLEPHRINE HYDROCHLORIDE 40 MCG/MIN: 10 INJECTION INTRAVENOUS at 13:17

## 2024-02-19 RX ADMIN — ROCURONIUM BROMIDE 25 MG: 10 SOLUTION INTRAVENOUS at 13:03

## 2024-02-19 RX ADMIN — FERROUS SULFATE TAB 325 MG (65 MG ELEMENTAL FE) 325 MG: 325 (65 FE) TAB at 19:13

## 2024-02-19 RX ADMIN — HYDROMORPHONE HYDROCHLORIDE 1 MG: 1 INJECTION, SOLUTION INTRAMUSCULAR; INTRAVENOUS; SUBCUTANEOUS at 15:00

## 2024-02-19 RX ADMIN — Medication 20 MG: at 14:25

## 2024-02-19 RX ADMIN — MONTELUKAST 10 MG: 10 TABLET, FILM COATED ORAL at 20:37

## 2024-02-19 RX ADMIN — CYANOCOBALAMIN TAB 500 MCG 1000 MCG: 500 TAB at 18:26

## 2024-02-19 RX ADMIN — THERA TABS 1 TABLET: TAB at 18:26

## 2024-02-19 RX ADMIN — HYDROMORPHONE HYDROCHLORIDE 4 MG: 4 TABLET ORAL at 23:35

## 2024-02-19 RX ADMIN — MIDAZOLAM 2 MG: 1 INJECTION INTRAMUSCULAR; INTRAVENOUS at 12:55

## 2024-02-19 RX ADMIN — CETIRIZINE HYDROCHLORIDE 10 MG: 10 TABLET, FILM COATED ORAL at 20:36

## 2024-02-19 RX ADMIN — ACETAMINOPHEN 1000 MG: 500 TABLET ORAL at 18:14

## 2024-02-19 RX ADMIN — GUAIFENESIN 1200 MG: 600 TABLET, EXTENDED RELEASE ORAL at 20:36

## 2024-02-19 RX ADMIN — LIDOCAINE HYDROCHLORIDE 80 MG: 20 INJECTION, SOLUTION EPIDURAL; INFILTRATION; INTRACAUDAL; PERINEURAL at 13:03

## 2024-02-19 RX ADMIN — CLONIDINE HYDROCHLORIDE 0.1 MG: 0.1 TABLET ORAL at 20:37

## 2024-02-19 RX ADMIN — Medication 1.5 TABLET: at 18:26

## 2024-02-19 RX ADMIN — PROPOFOL 200 MG: 10 INJECTION, EMULSION INTRAVENOUS at 13:03

## 2024-02-19 RX ADMIN — PROPOFOL 50 MG: 10 INJECTION, EMULSION INTRAVENOUS at 13:26

## 2024-02-19 RX ADMIN — FAMOTIDINE 20 MG: 20 TABLET ORAL at 20:37

## 2024-02-19 RX ADMIN — ACETAMINOPHEN 1000 MG: 500 TABLET ORAL at 23:35

## 2024-02-19 RX ADMIN — FENTANYL CITRATE 25 MCG: 50 INJECTION INTRAMUSCULAR; INTRAVENOUS at 15:52

## 2024-02-19 ASSESSMENT — PAIN DESCRIPTION - DESCRIPTORS
DESCRIPTORS: THROBBING
DESCRIPTORS: ACHING
DESCRIPTORS: THROBBING
DESCRIPTORS: ACHING

## 2024-02-19 ASSESSMENT — PAIN DESCRIPTION - LOCATION
LOCATION: BACK

## 2024-02-19 ASSESSMENT — PAIN SCALES - GENERAL
PAINLEVEL_OUTOF10: 10
PAINLEVEL_OUTOF10: 8
PAINLEVEL_OUTOF10: 5
PAINLEVEL_OUTOF10: 7
PAINLEVEL_OUTOF10: 8
PAINLEVEL_OUTOF10: 8
PAINLEVEL_OUTOF10: 7
PAINLEVEL_OUTOF10: 10
PAINLEVEL_OUTOF10: 7
PAINLEVEL_OUTOF10: 8
PAINLEVEL_OUTOF10: 8

## 2024-02-19 ASSESSMENT — PAIN - FUNCTIONAL ASSESSMENT
PAIN_FUNCTIONAL_ASSESSMENT: PREVENTS OR INTERFERES SOME ACTIVE ACTIVITIES AND ADLS
PAIN_FUNCTIONAL_ASSESSMENT: 0-10

## 2024-02-19 ASSESSMENT — PAIN DESCRIPTION - PAIN TYPE
TYPE: SURGICAL PAIN

## 2024-02-19 ASSESSMENT — PAIN DESCRIPTION - ORIENTATION: ORIENTATION: LOWER

## 2024-02-19 NOTE — PERIOP NOTE
TRANSFER - OUT REPORT:    Verbal report given to Amparo(name) on Yolande Dowell  being transferred to [facility] (unit) for routine post-op       Report consisted of patient’s Situation, Background, Assessment and   Recommendations(SBAR).     Time Pre op antibiotic given:1320  Anesthesia Stop time: 1530    Information from the following report(s) SBAR, OR Summary, Intake/Output, MAR, Accordion, and Recent Results was reviewed with the receiving nurse.    Opportunity for questions and clarification was provided.     Is the patient on 02? Yes       L/Min 2       Other     Is the patient on a monitor? No    Is the nurse transporting with the patient? No    Surgical Waiting Area notified of patient's transfer from PACU? Yes    Glasses and clothes to floor with pt

## 2024-02-19 NOTE — PROGRESS NOTES
Herbal and Nutritional Product Restrictions      The following herbal, alternative, and/or nutritional/dietary supplement product(s) has been discontinued per [employer] approved policy:    coenzyme Q10 capsule 400 mg daily    Please reorder upon discharge if appropriate.    Thank you,  Katie Cortez [facility]  2/19/2024 5:59 PM

## 2024-02-19 NOTE — ANESTHESIA PRE PROCEDURE
Date of last liquid consumption: 02/18/24                        Date of last solid food consumption: 02/18/24    BMI:   Wt Readings from Last 3 Encounters:   02/19/24 76.7 kg (169 lb)   01/23/24 78.5 kg (173 lb)   01/02/24 79.4 kg (175 lb)     Body mass index is 29.94 kg/m².    CBC:   Lab Results   Component Value Date/Time    WBC 6.6 11/03/2023 03:17 PM    RBC 4.95 11/03/2023 03:17 PM    HGB 15.3 11/03/2023 03:17 PM    HCT 46.6 11/03/2023 03:17 PM    MCV 94.1 11/03/2023 03:17 PM    RDW 13.6 11/03/2023 03:17 PM     11/03/2023 03:17 PM       CMP:   Lab Results   Component Value Date/Time     01/23/2024 11:49 AM    K 4.3 01/23/2024 11:49 AM     01/23/2024 11:49 AM    CO2 29 01/23/2024 11:49 AM    BUN 8 01/23/2024 11:49 AM    CREATININE 0.76 01/23/2024 11:49 AM    GFRAA >60 11/27/2019 04:04 AM    LABGLOM >60 01/23/2024 11:49 AM    GLUCOSE 90 01/23/2024 11:49 AM    CALCIUM 9.3 01/23/2024 11:49 AM       POC Tests:   No results for input(s): \"POCGLU\", \"POCNA\", \"POCK\", \"POCCL\", \"POCBUN\", \"POCHEMO\", \"POCHCT\" in the last 72 hours.      Coags:   Lab Results   Component Value Date/Time    PROTIME 10.5 11/03/2023 03:17 PM    INR 1.0 11/03/2023 03:17 PM       HCG (If Applicable): No results found for: \"PREGTESTUR\", \"PREGSERUM\", \"HCG\", \"HCGQUANT\"     ABGs: No results found for: \"PHART\", \"PO2ART\", \"WQR3UZC\", \"QLL1TEP\", \"BEART\", \"R1DZTQLW\"     Type & Screen (If Applicable):  No results found for: \"LABABO\", \"LABRH\"    Drug/Infectious Status (If Applicable):  No results found for: \"HIV\", \"HEPCAB\"    COVID-19 Screening (If Applicable): No results found for: \"COVID19\"        Anesthesia Evaluation  Patient summary reviewed and Nursing notes reviewed   history of anesthetic complications: PONV.  Airway: Mallampati: II  TM distance: >3 FB   Neck ROM: full  Mouth opening: > = 3 FB   Dental: normal exam         Pulmonary: breath sounds clear to auscultation  (+)     sleep apnea: on CPAP,       asthma:

## 2024-02-19 NOTE — ANESTHESIA POSTPROCEDURE EVALUATION
Department of Anesthesiology  Postprocedure Note    Patient: Yolande Dowell  MRN: [id number]  YOB: 1965  Date of evaluation: 2/19/2024    Procedure Summary       Date: 02/19/24 Room / Location: [facility] MAIN OR 02 / [facility] MAIN OR    Anesthesia Start: 1255 Anesthesia Stop: 1530    Procedure: L4 - L5 REVISION LAMINECTOMY WITH L4 - S1 REVISION FUSION AND L4-5 INTERBODY FUSION (ERAS) (Back) Diagnosis:       Arthrodesis status      Spinal stenosis, lumbar region, with neurogenic claudication      Acute back pain with sciatica, left      (Arthrodesis status [Z98.1])      (Spinal stenosis, lumbar region, with neurogenic claudication [M48.062])      (Acute back pain with sciatica, left [M54.42])    Providers: Bhavin Pelayo MD Responsible Provider: Mayte Barnes MD    Anesthesia Type: General ASA Status: 3            Anesthesia Type: General    Сергей Phase I: Сергей Score: 9    Сергей Phase II:      Anesthesia Post Evaluation    Patient location during evaluation: PACU  Level of consciousness: awake  Airway patency: patent  Nausea & Vomiting: no nausea  Cardiovascular status: hemodynamically stable  Respiratory status: acceptable  Hydration status: stable  Comments: --------------------            02/19/24               1645     --------------------   BP:       117/61     Pulse:      76       Resp:       11       Temp:                SpO2:      99%      --------------------   Multimodal analgesia pain management approach    No notable events documented.

## 2024-02-19 NOTE — FLOWSHEET NOTE
02/19/24 1524   Incision 02/19/24 Back Lower;Medial   Date First Assessed/Time First Assessed: 02/19/24 1514   Present on Original Admission: No  Location: Back  Incision Location Orientation: Lower;Medial   Dressing Status Clean;Dry;Intact   Incision Cleansed Cleansed with saline   Dressing/Treatment Steri-strips;ABD pad;Tape/Soft cloth adhesive tape   Closure Sutures;Steri-Strips   Margins Approximated

## 2024-02-19 NOTE — BRIEF OP NOTE
Brief Postoperative Note      Patient: Yolande Dowell  YOB: 1965  MRN: [id number]    Date of Procedure: 2/19/2024    Pre-Op Diagnosis Codes:     * Arthrodesis status [Z98.1]     * Spinal stenosis, lumbar region, with neurogenic claudication [M48.062]     * Acute back pain with sciatica, left [M54.42]    Post-Op Diagnosis: Same       Procedure(s):  L4 - L5 REVISION LAMINECTOMY WITH L4 - S1 REVISION FUSION AND L4-5 INTERBODY FUSION (ERAS)    Surgeon(s):  Bhavin Pelayo MD    Assistant:  Physician Assistant: Radha Benitez PA    Anesthesia: General    Estimated Blood Loss (mL): 150    Complications: None    Specimens:   * No specimens in log *    Implants:  Implant Name Type Inv. Item Serial No.  Lot No. LRB No. Used Action   GRAFT BNE FIBER 15 CC OSTEOAMP HUEY - [id number]  GRAFT BNE FIBER 15 CC OSTEOAMP HUEY [id number] [employer] "[employer], [place]" NA N/A 1 Implanted   GRAFT BNE SUB M [id number] COMPRESSIBLE SPNG STRP PROVIDE - [id number]  GRAFT BNE SUB M [id number] COMPRESSIBLE SPNG STRP PROVIDE [id number] [employer]-WD NA N/A 1 Implanted   KIT BNE GRFT XSM 1.4CC RHBMP-2 ABSRB CLLGN SPNG INFUSE - SNA  KIT BNE GRFT XSM 1.4CC RHBMP-2 ABSRB CLLGN SPNG INFUSE NA MEDTRONIC SPINALGRAFT TECH-WD [id number] N/A 1 Implanted   SPACER SPNL SHT 7 MM CATALYFT PL - SNA  SPACER SPNL SHT 7 MM CATALYFT PL NA MEDTRONIC [employer] DANEK-WD [id number] N/A 1 Implanted   SPACER SPNL SHT 7 MM CATALYFT PL - SNA  SPACER SPNL SHT 7 MM CATALYFT PL NA MEDTRONIC SOFAMOR DANEK-WD [id number] N/A 1 Implanted   SCREW SPNL L45MM DIA6.5MM POST THORACOLUMBOSACRAL CO CHROM - SNA  SCREW SPNL L45MM DIA6.5MM POST THORACOLUMBOSACRAL CO CHROM NA MEDTRONIC SOFAMOR DANEK-WD NA N/A 6 Implanted   BEN SPNL L50MM DIA5.5MM ANT POST THORACOLUMBOSACRAL TI - SNA  BEN SPNL L50MM DIA5.5MM ANT POST THORACOLUMBOSACRAL TI NA Brand.netEK-WD NA N/A 2 Implanted   SET SCR SPNL L6MM DIA5.5MM TI BRK OFF ZAHRA W/ DETACH CDH - SNA  SET SCR SPNL L6MM

## 2024-02-19 NOTE — FLOWSHEET NOTE
02/19/24 1418   Family Communication    Relationship to Patient Spouse   Family/Significant Other Update Called   Delivery Origin Nurse   Message Disposition Family present - message delivered   Update Given Yes   Family Communication   Family Update Message Procedure started;Patient stable

## 2024-02-20 VITALS
DIASTOLIC BLOOD PRESSURE: 61 MMHG | SYSTOLIC BLOOD PRESSURE: 116 MMHG | WEIGHT: 169 LBS | HEART RATE: 94 BPM | BODY MASS INDEX: 29.95 KG/M2 | OXYGEN SATURATION: 99 % | RESPIRATION RATE: 12 BRPM | TEMPERATURE: 97.9 F | HEIGHT: 63 IN

## 2024-02-20 LAB
HCT VFR BLD AUTO: 36.9 % (ref 35–47)
HGB BLD-MCNC: 11.6 G/DL (ref 11.5–16)

## 2024-02-20 PROCEDURE — 6370000000 HC RX 637 (ALT 250 FOR IP): Performed by: ORTHOPAEDIC SURGERY

## 2024-02-20 PROCEDURE — 6360000002 HC RX W HCPCS: Performed by: STUDENT IN AN ORGANIZED HEALTH CARE EDUCATION/TRAINING PROGRAM

## 2024-02-20 PROCEDURE — 6370000000 HC RX 637 (ALT 250 FOR IP): Performed by: STUDENT IN AN ORGANIZED HEALTH CARE EDUCATION/TRAINING PROGRAM

## 2024-02-20 PROCEDURE — 85018 HEMOGLOBIN: CPT

## 2024-02-20 PROCEDURE — 97116 GAIT TRAINING THERAPY: CPT

## 2024-02-20 PROCEDURE — 97165 OT EVAL LOW COMPLEX 30 MIN: CPT

## 2024-02-20 PROCEDURE — 2580000003 HC RX 258: Performed by: STUDENT IN AN ORGANIZED HEALTH CARE EDUCATION/TRAINING PROGRAM

## 2024-02-20 PROCEDURE — 97535 SELF CARE MNGMENT TRAINING: CPT

## 2024-02-20 PROCEDURE — 36415 COLL VENOUS BLD VENIPUNCTURE: CPT

## 2024-02-20 PROCEDURE — 6370000000 HC RX 637 (ALT 250 FOR IP): Performed by: PHYSICIAN ASSISTANT

## 2024-02-20 PROCEDURE — 97161 PT EVAL LOW COMPLEX 20 MIN: CPT

## 2024-02-20 PROCEDURE — 97530 THERAPEUTIC ACTIVITIES: CPT

## 2024-02-20 PROCEDURE — 94640 AIRWAY INHALATION TREATMENT: CPT

## 2024-02-20 PROCEDURE — 85014 HEMATOCRIT: CPT

## 2024-02-20 RX ORDER — GABAPENTIN 300 MG/1
300 CAPSULE ORAL 3 TIMES DAILY
Status: DISCONTINUED | OUTPATIENT
Start: 2024-02-20 | End: 2024-02-20 | Stop reason: HOSPADM

## 2024-02-20 RX ORDER — SENNA AND DOCUSATE SODIUM 50; 8.6 MG/1; MG/1
1 TABLET, FILM COATED ORAL 2 TIMES DAILY PRN
Qty: 60 TABLET | Refills: 0 | Status: SHIPPED | OUTPATIENT
Start: 2024-02-20

## 2024-02-20 RX ORDER — NALOXONE HYDROCHLORIDE 4 MG/.1ML
1 SPRAY NASAL PRN
Qty: 1 EACH | Refills: 0 | Status: SHIPPED | OUTPATIENT
Start: 2024-02-20

## 2024-02-20 RX ORDER — HYDROMORPHONE HYDROCHLORIDE 4 MG/1
2-4 TABLET ORAL EVERY 4 HOURS PRN
Qty: 50 TABLET | Refills: 0 | Status: SHIPPED | OUTPATIENT
Start: 2024-02-20 | End: 2024-02-28

## 2024-02-20 RX ADMIN — CYANOCOBALAMIN TAB 500 MCG 1000 MCG: 500 TAB at 09:16

## 2024-02-20 RX ADMIN — DULOXETINE HYDROCHLORIDE 120 MG: 60 CAPSULE, DELAYED RELEASE ORAL at 09:17

## 2024-02-20 RX ADMIN — FERROUS SULFATE TAB 325 MG (65 MG ELEMENTAL FE) 325 MG: 325 (65 FE) TAB at 09:18

## 2024-02-20 RX ADMIN — ACETAMINOPHEN 1000 MG: 500 TABLET ORAL at 12:29

## 2024-02-20 RX ADMIN — IPRATROPIUM BROMIDE 0.5 MG: 0.5 SOLUTION RESPIRATORY (INHALATION) at 08:27

## 2024-02-20 RX ADMIN — WATER 2000 MG: 1 INJECTION INTRAMUSCULAR; INTRAVENOUS; SUBCUTANEOUS at 05:38

## 2024-02-20 RX ADMIN — HYDROMORPHONE HYDROCHLORIDE 4 MG: 4 TABLET ORAL at 14:01

## 2024-02-20 RX ADMIN — BUPROPION HYDROCHLORIDE 150 MG: 150 TABLET, FILM COATED, EXTENDED RELEASE ORAL at 09:16

## 2024-02-20 RX ADMIN — SODIUM CHLORIDE, PRESERVATIVE FREE 10 ML: 5 INJECTION INTRAVENOUS at 09:20

## 2024-02-20 RX ADMIN — POLYETHYLENE GLYCOL 3350 17 G: 17 POWDER, FOR SOLUTION ORAL at 09:15

## 2024-02-20 RX ADMIN — CLONIDINE HYDROCHLORIDE 0.1 MG: 0.1 TABLET ORAL at 09:16

## 2024-02-20 RX ADMIN — HYDROMORPHONE HYDROCHLORIDE 0.5 MG: 1 INJECTION, SOLUTION INTRAMUSCULAR; INTRAVENOUS; SUBCUTANEOUS at 02:52

## 2024-02-20 RX ADMIN — Medication 1.5 TABLET: at 09:17

## 2024-02-20 RX ADMIN — HYDROMORPHONE HYDROCHLORIDE 4 MG: 4 TABLET ORAL at 05:39

## 2024-02-20 RX ADMIN — GABAPENTIN 300 MG: 300 CAPSULE ORAL at 12:29

## 2024-02-20 RX ADMIN — SENNOSIDES AND DOCUSATE SODIUM 1 TABLET: 8.6; 5 TABLET ORAL at 09:18

## 2024-02-20 RX ADMIN — HYDROMORPHONE HYDROCHLORIDE 4 MG: 4 TABLET ORAL at 09:17

## 2024-02-20 RX ADMIN — ACETAMINOPHEN 1000 MG: 500 TABLET ORAL at 05:39

## 2024-02-20 RX ADMIN — FAMOTIDINE 20 MG: 20 TABLET ORAL at 09:16

## 2024-02-20 RX ADMIN — ARFORMOTEROL TARTRATE: 15 SOLUTION RESPIRATORY (INHALATION) at 08:27

## 2024-02-20 RX ADMIN — GABAPENTIN 300 MG: 300 CAPSULE ORAL at 09:18

## 2024-02-20 RX ADMIN — THERA TABS 1 TABLET: TAB at 09:21

## 2024-02-20 RX ADMIN — IPRATROPIUM BROMIDE 0.5 MG: 0.5 SOLUTION RESPIRATORY (INHALATION) at 11:41

## 2024-02-20 RX ADMIN — CLONIDINE HYDROCHLORIDE 0.1 MG: 0.1 TABLET ORAL at 12:29

## 2024-02-20 RX ADMIN — GUAIFENESIN 1200 MG: 600 TABLET, EXTENDED RELEASE ORAL at 09:17

## 2024-02-20 RX ADMIN — KETOROLAC TROMETHAMINE 30 MG: 30 INJECTION, SOLUTION INTRAMUSCULAR; INTRAVENOUS at 09:15

## 2024-02-20 ASSESSMENT — PAIN DESCRIPTION - DESCRIPTORS
DESCRIPTORS: ACHING
DESCRIPTORS: ACHING

## 2024-02-20 ASSESSMENT — PAIN DESCRIPTION - ORIENTATION
ORIENTATION: LOWER;UPPER
ORIENTATION: LOWER

## 2024-02-20 ASSESSMENT — PAIN DESCRIPTION - LOCATION
LOCATION: BACK
LOCATION: BACK

## 2024-02-20 ASSESSMENT — PAIN SCALES - GENERAL
PAINLEVEL_OUTOF10: 3
PAINLEVEL_OUTOF10: 7

## 2024-02-20 NOTE — DISCHARGE INSTRUCTIONS
After Hospital Care Plan:  Discharge Instructions Lumbar Fusion Surgery   Dr. Lock   Patient Name: Yolande Dowell    Date of procedure: 2/19/2024    Date of discharge: 2/20/2024    Follow up appointments  -follow up with Dr. Dr. Lock in 2 weeks.  Call [phone] to make an appointment as soon as you get home from the hospital.    Home Health Agency: ____________________   phone: _______________________  The agency will contact you to arrange dates/times for visits.  Please call them if you do not hear from them within 24 hours after you are discharged  Physical therapy 3 times a week for 3 weeks  Nursing-initial assessment and as needed    When to call your Orthopaedic Surgeon:  -Signs of infection-if your incision is red; continues to have drainage; drainage has a foul odor or if you have a persistent fever over 101 degrees for 24 hours  -Nausea or vomiting, severe headache  -Loss of bowel or bladder function, inability to urinate  -Changes in sensation in your arms or legs (numbness, tingling, loss of color)  -Increased weakness-greater than before your surgery  -Severe pain or pain not relieved by medications  -Signs of a blood clot in your leg-calf pain, tenderness, redness, swelling of lower leg    When to call your Primary Care Physician:  -Concerns about medical conditions such as diabetes, high blood pressure, asthma, congestive heart failure  -Call if blood sugars are elevated, persistent headache or dizziness, coughing or congestion, constipation or diarrhea, burning with urination, abnormal heart rate    When to call 911 and go to the nearest emergency room:  -Acute onset of chest pain, shortness of breath, difficulty breathing    Activity  -You are going home a well person, be as active as possible.  Your only exercise should be walking.  Start with short frequent walks and increase your walking distance each day.  -Limit the amount of time you sit to 20-30 minute intervals.  Sitting for

## 2024-02-20 NOTE — PLAN OF CARE
Problem: Pain  Goal: Verbalizes/displays adequate comfort level or baseline comfort level  2/20/2024 1116 by Taylor Pace RN  Outcome: Progressing  2/19/2024 2254 by Judy Tolbert RN  Outcome: Progressing     Problem: Discharge Planning  Goal: Discharge to home or other facility with appropriate resources  Outcome: Progressing     Problem: Safety - Adult  Goal: Free from fall injury  2/20/2024 1116 by Taylor Pace RN  Outcome: Progressing  Flowsheets (Taken 2/20/2024 0803)  Free From Fall Injury: Instruct family/caregiver on patient safety  2/19/2024 2254 by Judy Tolbert RN  Outcome: Progressing     Problem: Skin/Tissue Integrity - Adult  Goal: Incisions, wounds, or drain sites healing without S/S of infection  2/20/2024 1116 by Taylor Pace RN  Outcome: Progressing  Flowsheets (Taken 2/20/2024 0803)  Incisions, Wounds, or Drain Sites Healing Without Sign and Symptoms of Infection:   Implement wound care per orders   TWICE DAILY: Assess and document dressing/incision, wound bed, drain sites and surrounding tissue   TWICE DAILY: Assess and document skin integrity  2/19/2024 2254 by Judy Tolbert RN  Outcome: Progressing     Problem: Musculoskeletal - Adult  Goal: Return mobility to safest level of function  2/20/2024 1116 by Taylor Pace RN  Outcome: Progressing  2/19/2024 2254 by Judy Tolbert RN  Outcome: Progressing  Goal: Maintain proper alignment of affected body part  2/20/2024 1116 by Taylor Pace RN  Outcome: Progressing  2/19/2024 2254 by Judy Tolbert RN  Outcome: Progressing  Goal: Return ADL status to a safe level of function  2/20/2024 1116 by Taylor Pace RN  Outcome: Progressing  2/19/2024 2254 by Judy Tolbert RN  Outcome: Progressing     Problem: Physical Therapy - Adult  Goal: By Discharge: Performs mobility at highest level of function for planned discharge setting.  See evaluation for individualized goals.  Description: FUNCTIONAL STATUS 
  Problem: Pain  Goal: Verbalizes/displays adequate comfort level or baseline comfort level  Outcome: Progressing     Problem: Safety - Adult  Goal: Free from fall injury  Outcome: Progressing     Problem: Skin/Tissue Integrity - Adult  Goal: Incisions, wounds, or drain sites healing without S/S of infection  Outcome: Progressing     Problem: Musculoskeletal - Adult  Goal: Return mobility to safest level of function  Outcome: Progressing  Goal: Maintain proper alignment of affected body part  Outcome: Progressing  Goal: Return ADL status to a safe level of function  Outcome: Progressing     
                                Pain Rating:  Pt reports pain is well controlled  Pain Intervention(s):   pain is at a level acceptable to the patient    Activity Tolerance:   Good and Fair     After treatment:   Patient left in no apparent distress sitting up in chair and Call bell within reach    COMMUNICATION/EDUCATION:   The patient's plan of care was discussed with: occupational therapist and registered nurse    Patient Education  Education Given To: Patient;Family  Education Provided: Role of Therapy;Plan of Care;Precautions;Equipment;Transfer Training;Fall Prevention Strategies  Education Provided Comments: Pt educated on preventing side bending with SPC use; pt also educated on 30-45\" sitting increments  Education Method: Demonstration;Verbal  Barriers to Learning: None  Education Outcome: Verbalized understanding    Thank you for this referral.  CROW BUCK, PT  Minutes: 39      Physical Therapy Evaluation Charge Determination   History Examination Presentation Decision-Making   LOW Complexity : Zero comorbidities / personal factors that will impact the outcome / POC LOW Complexity : 1-2 Standardized tests and measures addressing body structure, function, activity limitation and / or participation in recreation  LOW Complexity : Stable, uncomplicated  AM-PAC  LOW    Based on the above components, the patient evaluation is determined to be of the following complexity level: Low

## 2024-02-20 NOTE — CONSULTS
13 TOTAL SURGERIES    CERVICAL FUSION  2012    C5-C6 hardware    CERVICAL FUSION N/A 07/03/2023    C4-5 REVISION ANTERIOR CERVICAL DISCECTOMY AND FUSION performed by Bhavin Pelayo MD at [facility] MAIN OR    CHEST SURGERY  2000    BIOPSY OUTER LUNG    CHOLECYSTECTOMY  1998    COLONOSCOPY N/A 10/27/2022    COLONOSCOPY performed by Juaquin Barger MD at [facility] ENDOSCOPY    COLPOSCOPY  2001    ENDOMETRIAL ABLATION  2008    ENDOSCOPY, COLON, DIAGNOSTIC      GASTRIC BYPASS SURGERY  2003    GASTRIC BYPASS SURGERY  2019    revision and hiatal hernia repair    GYN  2001    CERVIX CONE PROCEDURE    GYN  2008    EUTERO ABLATION    HEENT      HEENT  05/29/2017    DOG BITE TO FACE     HEENT  09/15/2017    DOG BITE TO FACE     HEENT Bilateral 08/20/2018    EYE /NOSE FROM DOG BITE    HYSTERECTOMY (CERVIX STATUS UNKNOWN)  2014    IR BRONCHOSCOPY  2015    X2    KIDNEY STONE SURGERY Bilateral 01/18/2023    KNEE ARTHROSCOPY Right 2016    KNEE CARTILAGE SURGERY Right 2016    LAPAROSCOPY  05/23/2019    LAPAROSCOPIC PARTIAL GASTRECTOMY  WITH RESECTION OF SMALL BOWEL , LYSIS OF ADHSION , HIATAL HEANIA REPAIR    LUMBAR FUSION  2011    L5-S1 hardware    LUMBAR SPINE SURGERY N/A 2/19/2024    L4 - L5 REVISION LAMINECTOMY WITH L4 - S1 REVISION FUSION AND L4-5 INTERBODY FUSION (ERAS) performed by Bhavin Pelayo MD at [facility] MAIN OR    LUNG BIOPSY  2000    MASTECTOMY Bilateral 2012    NEUROLOGICAL SURGERY  11/16/2017    REPLACE RELOCATE BATTERY FOR STIMULATOR     NEUROLOGICAL SURGERY  2013    PAIN STIMULATOR IMPLANTED T8-T9    ORTHOPEDIC SURGERY Right 2010    BONE GRAFT SCAFOID BONE WRIST    ORTHOPEDIC SURGERY Right 09/28/2015    MUSCLE BIOPSY -[facility]ADD MUSCULAR DYSTROPHY     OTHER SURGICAL HISTORY  2013    PAIN STIMULATOR    OTHER SURGICAL HISTORY  2015    MUSCLE BIOPSY     OTHER SURGICAL HISTORY  05/23/2019    BYPASS REVISION HIATAL HERNIA FIXED    RHINOPLASTY  1982    RHINOPLASTY  1991    STIMULATOR SURGERY N/A 11/13/2023    DCS BATTERY EXCHANGE

## 2024-02-20 NOTE — PROGRESS NOTES
OCCUPATIONAL THERAPY EVALUATION/DISCHARGE  Patient: Yolande Dowell (58 y.o. female)  Date: 2/20/2024  Primary Diagnosis: Arthrodesis status [Z98.1]  Spinal stenosis, lumbar region, with neurogenic claudication [M48.062]  Acute back pain with sciatica, left [M54.42]  Lumbar stenosis with neurogenic claudication [M48.062]  Procedure(s) (LRB):  L4 - L5 REVISION LAMINECTOMY WITH L4 - S1 REVISION FUSION AND L4-5 INTERBODY FUSION (ERAS) (N/A) 1 Day Post-Op     Precautions:           Spinal Precautions: No Bending, No Lifting, No Twisting        ASSESSMENT :  Based on the objective data below, the patient is s/p L4 - L5 REVISION LAMINECTOMY WITH L4 - S1 REVISION FUSION AND L4-5 INTERBODY FUSION POD #1. Patient reports hx of multiple spinal surgeries, is very familiar with back precautions. Patient endorsing increased urgency to use bathroom, patient able to complete log roll and bedroom/bathroom mobility without AD while holding onto IV pole with CGA-SPV. Patient able to demo toileting hygiene while maintaining precautions with minimal cueing, following tolerated standing grooming at sink without fatigue or LOB. Patient reports good family support at home, has excellent carryover of back precautions during ADL tasks, reports no desire to have HHOT at DC. Patient returned to supine in bed at conclusion of session all needs met, discussed with PA and PT. Further skilled acute occupational therapy is not indicated at this time.    Functional Outcome Measure:  The patient scored 22/24 on the [facility] outcome measure.        PLAN :  Recommend with staff: up with [facility] for line mgmt    Recommendation for discharge: (in order for the patient to meet his/her long term goals): No skilled occupational therapy    Other factors to consider for discharge:  spinal prec    IF patient discharges home will need the following DME: patient owns DME required for discharge     SUBJECTIVE:   Patient stated, “I know how this goes, I have it

## 2024-02-20 NOTE — PROGRESS NOTES
Orthopedic Spine Progress Note  Post Op day: 1 Day Post-Op    2024 7:48 AM   Admit Date: 2024  Procedure: Procedure(s):  L4 - L5 REVISION LAMINECTOMY WITH L4 - S1 REVISION FUSION AND L4-5 INTERBODY FUSION (ERAS)    Subjective:     Yolande Dowell has no complaints. Pain control adequate.  Tolerating diet. Denies N/V, CP, SOB.    Objective:          Physical Exam:  General:  Alert and oriented. No acute distress.   Heart:  Respirations unlabored.   Abdomen:   Extremities: Soft, non-tender.  No evidence of cyanosis. Pulses palpable in both upper and lower extremities.       Neurologic:  Musculoskeletal:  No new motor deficits. Neurovascular exam within normal limits.  Sensation stable.  Motor: unchanged C5-T1 and L2-S1.   Liz's sign negative in bilateral lower extremities.  Calves soft, nontender upon palpation.  Moves both upper and lower extremities.   Incision: clean, dry, and intact.  No significant erythema or swelling.  No active drainage noted.        Vital Signs:    Blood pressure 114/68, pulse 70, temperature 98.4 °F (36.9 °C), temperature source Oral, resp. rate 14, height 1.6 m (5' 3\"), weight 76.7 kg (169 lb), SpO2 99 %.  Temp (24hrs), Av.8 °F (36.6 °C), Min:96.9 °F (36.1 °C), Max:98.4 °F (36.9 °C)      LAB:    Recent Labs     24  0313   HCT 36.9   HGB 11.6     Lab Results   Component Value Date/Time     2024 11:49 AM    K 4.3 2024 11:49 AM     2024 11:49 AM    CO2 29 2024 11:49 AM    BUN 8 2024 11:49 AM       Intake/Output:  No intake/output data recorded.   1901 -  0700  In: 1403 [I.V.:1403]  Out: 620 [Urine:500; Drains:100]    PT/OT:   Gait:                    Assessment:   Patient is 1 Day Post-Op s/p Procedure(s):  L4 - L5 REVISION LAMINECTOMY WITH L4 - S1 REVISION FUSION AND L4-5 INTERBODY FUSION (ERAS)    Plan:     1.  Continue PT/OT - Mobilize as tolerated, spine precautions.   2.  Continue established methods of pain

## 2024-02-20 NOTE — DISCHARGE SUMMARY
operative analgesia.     Discharge Medications:  Discharge Medication List as of 2/20/2024  2:37 PM        START taking these medications    Details   sennosides-docusate sodium (SENOKOT-S) 8.6-50 MG tablet Take 1 tablet by mouth 2 times daily as needed for Constipation Take as needed for opioid induced constipation, Disp-60 tablet, R-0Normal      naloxone 4 MG/0.1ML LIQD nasal spray 1 spray by Nasal route as needed for Opioid Reversal, Disp-1 each, R-0Normal           CONTINUE these medications which have CHANGED    Details   HYDROmorphone (DILAUDID) 4 MG tablet Take 0.5-1 tablets by mouth every 4 hours as needed for Pain for up to 8 days. Max Daily Amount: 24 mg, Disp-50 tablet, R-0Normal           CONTINUE these medications which have NOT CHANGED    Details   NONFORMULARY Place 1 patch onto the skin APPLY NEW PATCH Q72HRS, 12.5MG PATCHHistorical Med      Semaglutide-Weight Management (WEGOVY) 1.7 MG/0.75ML SOAJ SC injection Inject 1.7 mg into the skin every 7 days FRIDAYSHistorical Med      tezepelumab-ekko (TEZSPIRE) 210 MG/1.91ML SOSY injection Inject 1.91 mLs into the skin every 30 days DONE IN PULMONOLOGIST'S OFFICE, DUE FIRST FRIDAY OF FEB.Historical Med      buPROPion (WELLBUTRIN XL) 150 MG extended release tablet Take 1 tablet by mouth every morningHistorical Med      ipratropium 0.5 mg-albuterol 2.5 mg (DUONEB) 0.5-2.5 (3) MG/3ML SOLN nebulizer solution Take 3 mLs by nebulization every 6 hours as needed for Shortness of Breath or WheezingHistorical Med      Sodium Chloride (HYPERSAL) 3.5 % NEBU Inhale into the lungs as needed (FOR SHORTNESS OF BREATH, TRACHEAL BRONCHIAL MALASIA)Historical Med      Multiple Vitamins-Minerals (CENTRUM SILVER PO) Take 1 tablet by mouth dailyHistorical Med      Ferrous Sulfate (IRON PO) Take 65 mg by mouth dailyHistorical Med      cetirizine (ZYRTEC) 10 MG tablet Take 1 tablet by mouth at bedtimeHistorical Med      cloNIDine (CATAPRES) 0.1 MG tablet Take 1 tablet by mouth 3

## 2024-02-20 NOTE — CARE COORDINATION
Transition of Care Plan:   RUR:  5%  Prior Level of Functioning: ADLs mostly indpt, assistance with stairs and carrying meals by spouse   Disposition: home with spouse  PT recommends PT/OT HH at home if patient is comfortable   Following conversation with patient and patient stated she doesn't need HH   DME needed:  Patient currently uses cane   Transportation at discharge:  Spouse or mother in law   IM/IMM Medicare/ letter given:  Received 2/20/24  Caregiver/Emergency Contact:  Costa Dowell - [phone]  Preferred pharmacy [facility]     Note - CM at the bedside with patient and patient spouse to verify demographics and information. Patient currently lives in a 2-story house with her sleeping arrangements on the 2nd-story. She states her spouse aids her in going up the stairs and also brings meals to her as needed; ADLs such as bathing and dressing she is indept. Current support is spouse, her two daughters, and her mother in law. Current and baseline DME is cane. Patient has history of HH in 2016, but currently has no preference. Patient will have spouse or mother in law provide transportation to home. CM inquired if patient has any questions, patient verified has not further questions or needs at this time.    02/20/24 1040   Service Assessment   Patient Orientation Alert and Oriented   Cognition Alert   History Provided By Patient   Primary Caregiver Spouse   Accompanied By/Relationship Costa Dowell / Spouse   Support Systems Spouse/Significant Other;Children;Family Members   PCP Verified by CM Yes   Last Visit to PCP Within last 3 months   Prior Functional Level Assistance with the following:;Mobility;Cooking   Current Functional Level Assistance with the following:;Cooking;Mobility   Can patient return to prior living arrangement Yes   Family able to assist with home care needs: Yes   Financial Resources Medicare   Social/Functional History   Lives With Spouse   Type of Home House

## 2024-02-20 NOTE — PROGRESS NOTES
Kumar villatoro, attempted lab draw twice. Only able to obtain H&H.  Will notify incoming nurse that BMP is still needed.

## 2024-02-20 NOTE — OP NOTE
[facility]  OPERATIVE REPORT    Name:  DENNISE ANAYA  MR#:  [id number]  :  1965  ACCOUNT #:  [id number]  DATE OF SERVICE:  2024    PREOPERATIVE DIAGNOSES:  Status post L5-S1 fusion, chronic low back pain, lumbar stenosis at L4-5, lumbar spondylolisthesis at L4-5.    POSTOPERATIVE DIAGNOSES:  Status post L5-S1 fusion, chronic low back pain, lumbar stenosis at L4-5, lumbar spondylolisthesis at L4-5.    PROCEDURE PERFORMED:    SURGEON:  Bhavin Pelayo MD    ASSISTANT:  Radha Benitez PA-C    ANESTHESIA:  General anesthesia.    COMPLICATIONS:  No complications.    SPECIMENS REMOVED:  No specimens.    IMPLANTS:  Instrumentation includes Medtronic Solera pedicle screws and Medtronic Catalyft interbody graft.    ESTIMATED BLOOD LOSS:  Minimal    DRAINS:  One mini Hemovac.    INDICATIONS:  This is a pleasant 58-year-old female with a history of chronic back pain and bilateral leg pain.  Previous L5-S1 fusion, spondylosis of L4-5.  Now for decompression and extension of fusion.    PROCEDURE:  The patient was brought to the operative suite, underwent general anesthesia without difficulty.  Preoperative neuromonitoring was placed, baselines obtained, and these remained stable throughout the surgical procedure.  Perioperative antibiotics were given to the patient.  The patient was placed prone on the open Nico table with all bony prominences well padded.  Back was prepped and draped sterilely.  Time-out confirmed.  We then did a midline incision using her previous surgical incision, carrying this out just slightly proximally.  Dissection was carried down to the thoracodorsal fascia, with exposure of the lamina of the inferior portion of L3, the residual of L4, the hypertrophic facet of L4-5, and the transverse process of L4 identified.  We then started the wide revision laminectomy with removal of the residual L4 lamina, carefully dissected down the previous scar tissue.  Wide

## 2024-10-30 ENCOUNTER — HOSPITAL ENCOUNTER (OUTPATIENT)
Facility: HOSPITAL | Age: 59
Discharge: HOME | End: 2024-11-02
Payer: MEDICARE

## 2024-10-30 ENCOUNTER — TRANSCRIBE ORDERS (OUTPATIENT)
Facility: HOSPITAL | Age: 59
End: 2024-10-30

## 2024-10-30 DIAGNOSIS — M54.12 RADICULOPATHY, CERVICAL: Primary | ICD-10-CM

## 2024-10-30 DIAGNOSIS — M54.12 RADICULOPATHY, CERVICAL: ICD-10-CM

## 2024-10-30 DIAGNOSIS — D48.5 NEOPLASM OF UNCERTAIN BEHAVIOR OF SKIN: ICD-10-CM

## 2024-10-30 DIAGNOSIS — M54.6 PAIN IN THORACIC SPINE: ICD-10-CM

## 2024-10-30 DIAGNOSIS — M54.6 PAIN IN THORACIC SPINE: Primary | ICD-10-CM

## 2024-10-30 PROCEDURE — 72072 X-RAY EXAM THORAC SPINE 3VWS: CPT

## 2024-10-30 PROCEDURE — 76536 US EXAM OF HEAD AND NECK: CPT

## 2024-10-30 PROCEDURE — 72040 X-RAY EXAM NECK SPINE 2-3 VW: CPT

## 2024-11-12 ENCOUNTER — HOSPITAL ENCOUNTER (EMERGENCY)
Facility: HOSPITAL | Age: 59
Discharge: LEFT BEFORE DISCHARGE | End: 2024-11-12

## 2024-11-12 VITALS
DIASTOLIC BLOOD PRESSURE: 74 MMHG | TEMPERATURE: 98.3 F | BODY MASS INDEX: 31.89 KG/M2 | SYSTOLIC BLOOD PRESSURE: 144 MMHG | OXYGEN SATURATION: 100 % | RESPIRATION RATE: 18 BRPM | HEIGHT: 63 IN | WEIGHT: 180 LBS | HEART RATE: 82 BPM

## 2024-11-12 ASSESSMENT — PAIN - FUNCTIONAL ASSESSMENT
PAIN_FUNCTIONAL_ASSESSMENT: ACTIVITIES ARE NOT PREVENTED
PAIN_FUNCTIONAL_ASSESSMENT: 0-10

## 2024-11-12 ASSESSMENT — PAIN DESCRIPTION - ORIENTATION: ORIENTATION: LEFT

## 2024-11-12 ASSESSMENT — PAIN DESCRIPTION - FREQUENCY: FREQUENCY: CONTINUOUS

## 2024-11-12 ASSESSMENT — PAIN DESCRIPTION - DESCRIPTORS: DESCRIPTORS: ACHING

## 2024-11-12 ASSESSMENT — PAIN SCALES - GENERAL: PAINLEVEL_OUTOF10: 7

## 2024-11-12 ASSESSMENT — PAIN DESCRIPTION - PAIN TYPE: TYPE: ACUTE PAIN

## 2024-11-12 NOTE — ED TRIAGE NOTES
Pt states that her dog had acl surgery and that he wouldn't let her look at his leg and he went to bite her she lunged backwards and hit her left ribs on the floor. She states \"I dont think I broke anything I think its muscular\"

## 2024-11-18 ENCOUNTER — HOSPITAL ENCOUNTER (OUTPATIENT)
Facility: HOSPITAL | Age: 59
Discharge: HOME | End: 2024-11-21
Payer: MEDICARE

## 2024-11-18 ENCOUNTER — TRANSCRIBE ORDERS (OUTPATIENT)
Facility: HOSPITAL | Age: 59
End: 2024-11-18

## 2024-11-18 DIAGNOSIS — R07.81 RIB PAIN: ICD-10-CM

## 2024-11-18 DIAGNOSIS — Z91.81 PERSONAL HISTORY OF FALL: ICD-10-CM

## 2024-11-18 DIAGNOSIS — R07.81 RIB PAIN ON LEFT SIDE: ICD-10-CM

## 2024-11-18 DIAGNOSIS — Z91.81 PERSONAL HISTORY OF FALL: Primary | ICD-10-CM

## 2024-11-18 PROCEDURE — 71101 X-RAY EXAM UNILAT RIBS/CHEST: CPT

## 2024-12-02 ENCOUNTER — HOSPITAL ENCOUNTER (OUTPATIENT)
Facility: HOSPITAL | Age: 59
Discharge: HOME | End: 2024-12-05
Payer: MEDICARE

## 2024-12-02 DIAGNOSIS — R22.1 NECK MASS: ICD-10-CM

## 2024-12-02 DIAGNOSIS — M54.12 BRACHIAL NEURITIS: ICD-10-CM

## 2024-12-02 PROCEDURE — 70491 CT SOFT TISSUE NECK W/DYE: CPT

## 2024-12-02 PROCEDURE — 72125 CT NECK SPINE W/O DYE: CPT

## 2024-12-02 PROCEDURE — 6360000004 HC RX CONTRAST MEDICATION: Performed by: STUDENT IN AN ORGANIZED HEALTH CARE EDUCATION/TRAINING PROGRAM

## 2024-12-02 RX ORDER — IOPAMIDOL 755 MG/ML
100 INJECTION, SOLUTION INTRAVASCULAR
Status: COMPLETED | OUTPATIENT
Start: 2024-12-02 | End: 2024-12-02

## 2024-12-02 RX ADMIN — IOPAMIDOL 100 ML: 755 INJECTION, SOLUTION INTRAVENOUS at 17:22

## (undated) DEVICE — BW-412T DISP COMBO CLEANING BRUSH: Brand: SINGLE USE COMBINATION CLEANING BRUSH

## (undated) DEVICE — SUTURE VCRL SZ 2-0 L36IN ABSRB UD L36MM CT-1 1/2 CIR J945H

## (undated) DEVICE — SET ADMIN 16ML TBNG L100IN 2 Y INJ SITE IV PIGGY BK DISP

## (undated) DEVICE — APPLICATOR FBR TIP L6IN COT TIP WOOD SHFT SWAB 2000 PER CA

## (undated) DEVICE — KENDALL SCD EXPRESS SLEEVES, KNEE LENGTH, MEDIUM: Brand: KENDALL SCD

## (undated) DEVICE — 1200 GUARD II KIT W/5MM TUBE W/O VAC TUBE: Brand: GUARDIAN

## (undated) DEVICE — BIPOLAR IRRIGATOR INTEGRATED TUBING AND BIPOLAR CORD SET, DISPOSABLE

## (undated) DEVICE — BLADELESS OPTICAL TROCAR WITH FIXATION CANNULA: Brand: VERSAONE

## (undated) DEVICE — Device

## (undated) DEVICE — ENDO CARRY-ON PROCEDURE KIT INCLUDES ENZYMATIC SPONGE, GAUZE, BIOHAZARD LABEL, TRAY, LUBRICANT, DIRTY SCOPE LABEL, WATER LABEL, TRAY, DRAWSTRING PAD, AND DEFENDO 4-PIECE KIT.: Brand: ENDO CARRY-ON PROCEDURE KIT

## (undated) DEVICE — FILTER SMK EVAC FLO CLR MEGADYNE

## (undated) DEVICE — ARTICULATING RELOAD WITH TRI-STAPLE TECHNOLOGY: Brand: ENDO GIA

## (undated) DEVICE — SET ADMIN 16ML TBNG L100IN 2 Y INJ SITE IV PIGGY BK DISP (ORDER IN MULIPLES OF 48)

## (undated) DEVICE — CONTAINER SPEC 20 ML LID NEUT BUFF FORMALIN 10 % POLYPR STS

## (undated) DEVICE — GLOVE SURG SZ 8 L12IN FNGR THK79MIL GRN LTX FREE

## (undated) DEVICE — SUTURE ETHBND EXCEL SZ 2 L30IN NONABSORBABLE GRN L40MM V-37 MX69G

## (undated) DEVICE — 3000CC GUARDIAN II: Brand: GUARDIAN

## (undated) DEVICE — LAMINECTOMY-SMH: Brand: MEDLINE INDUSTRIES, INC.

## (undated) DEVICE — CATH IV AUTOGRD BC PNK 20GA 25 -- INSYTE

## (undated) DEVICE — ELECTRODE,RADIOTRANSLUCENT,FOAM,3PK: Brand: MEDLINE

## (undated) DEVICE — COVER,MAYO STAND,STERILE: Brand: MEDLINE

## (undated) DEVICE — INFECTION CONTROL KIT SYS

## (undated) DEVICE — ELECTRODE ES 36CM LAP FLAT L HK COAT DISP CLEANCOAT

## (undated) DEVICE — ROYAL SILK SURGICAL GOWN, XXL: Brand: CONVERTORS

## (undated) DEVICE — CONTAINER,SPECIMEN,3OZ,OR STRL: Brand: MEDLINE

## (undated) DEVICE — DEVICE SUT 0 L48IN GRN POLY BRAID LD UNIT DISP SURGDAC

## (undated) DEVICE — SOLUTION IV 250ML 0.9% SOD CHL CLR INJ FLX BG CONT PRT CLSR

## (undated) DEVICE — BIT DRL L5IN DIA2MM STD ST S STL TWST BUSA

## (undated) DEVICE — APPLICATOR BNDG 1MM ADH PREMIERPRO EXOFIN

## (undated) DEVICE — SET EXTN TBNG L BOR 4 W STPCOCK ST 32IN PRIMING VOL 6ML

## (undated) DEVICE — SURGICAL PROCEDURE KIT GEN LAPAROSCOPY LF

## (undated) DEVICE — VISUALIZATION SYSTEM: Brand: CLEARIFY

## (undated) DEVICE — CATH IV AUTOGRD BC BLU 22GA 25 -- INSYTE

## (undated) DEVICE — MEDI-VAC NON-CONDUCTIVE SUCTION TUBING: Brand: CARDINAL HEALTH

## (undated) DEVICE — STERILE POLYISOPRENE POWDER-FREE SURGICAL GLOVES WITH EMOLLIENT COATING: Brand: PROTEXIS

## (undated) DEVICE — SUTURE ABSRB L30CM 2-0 VLT SPRL PDS + STRATAFIX SXPP1B410

## (undated) DEVICE — STRAP,POSITIONING,KNEE/BODY,FOAM,4X60": Brand: MEDLINE

## (undated) DEVICE — STRIP,CLOSURE,WOUND,MEDI-STRIP,1/2X4: Brand: MEDLINE

## (undated) DEVICE — DRILL BIT 7080510 11 MM DRILL BIT S

## (undated) DEVICE — BAG BELONG PT PERS CLEAR HANDL

## (undated) DEVICE — SYR 10ML CTRL LR LCK NSAF LF --

## (undated) DEVICE — LAP-BAND SYSTEM CALIBRATION TUBE: Brand: LAP-BAND

## (undated) DEVICE — SOLUTION IRRIG 3000ML 0.9% SOD CHL USP UROMATIC PLAS CONT

## (undated) DEVICE — Z DISCONTINUEDSOLUTION PREP 2OZ 10% POVIDONE IOD SCR CAP BTL

## (undated) DEVICE — NEEDLE HYPO 18GA L1.5IN PNK S STL HUB POLYPR SHLD REG BVL

## (undated) DEVICE — TELFA NON-ADHERENT ABSORBENT DRESSING: Brand: TELFA

## (undated) DEVICE — SNARE ENDOSCP M L240CM W27MM SHTH DIA2.4MM CHN 2.8MM OVL

## (undated) DEVICE — NEONATAL-ADULT SPO2 SENSOR: Brand: NELLCOR

## (undated) DEVICE — BITE BLK ENDOSCP AD 54FR GRN POLYETH ENDOSCP W STRP SLD

## (undated) DEVICE — GLOVE SURG SZ 75 L12IN FNGR THK94MIL STD WHT LTX FREE

## (undated) DEVICE — DRAPE,EENT,SPLIT,STERILE: Brand: MEDLINE

## (undated) DEVICE — REM POLYHESIVE ADULT PATIENT RETURN ELECTRODE: Brand: VALLEYLAB

## (undated) DEVICE — SUTURE STRATAFIX SPRL MCRYL + SZ 3-0 L24IN ABSRB UD PS-2 SXMP1B108

## (undated) DEVICE — T4 HOOD

## (undated) DEVICE — BW-400L DISP SNGL-END CLEANINGBRUSH: Brand: OLYMPUS

## (undated) DEVICE — SUTURE PROL SZ 0 L30IN NONABSORBABLE BLU L26MM CT-2 1/2 CIR 8412H

## (undated) DEVICE — SUTURE VCRL SZ 1 L36IN ABSRB UD L36MM CT-1 1/2 CIR J947H

## (undated) DEVICE — SUTURE STRATAFIX SPRL MCRYL + SZ 3 0 L8IN ABSRB UD L26MM SH SXMP1B427

## (undated) DEVICE — 3M™ CUROS™ DISINFECTING CAP FOR NEEDLELESS CONNECTORS 270/CARTON 20 CARTONS/CASE CFF1-270: Brand: CUROS™

## (undated) DEVICE — SUTURE PERMAHAND SZ 2-0 L18IN NONABSORBABLE BLK L26MM PS 1588H

## (undated) DEVICE — HEWSON SUTURE RETRIEVER: Brand: HEWSON SUTURE RETRIEVER

## (undated) DEVICE — Device: Brand: SINGLE USE SOFT BRUSH

## (undated) DEVICE — DRAPE,U/ SHT,SPLIT,PLAS,STERIL: Brand: MEDLINE

## (undated) DEVICE — DRAIN CHN 19FR L0.25MM DIA6.3MM SIL RND HUBLESS FULL FLUT

## (undated) DEVICE — NEEDLE HYPO 22GA L1.5IN BLK S STL HUB POLYPR SHLD REG BVL

## (undated) DEVICE — MASTISOL ADHESIVE LIQ 2/3ML

## (undated) DEVICE — SOLIDIFIER FLUID 3000 CC ABSORB

## (undated) DEVICE — (D)PREP SKN CHLRAPRP APPL 26ML -- CONVERT TO ITEM 371833

## (undated) DEVICE — BASIN EMSIS 16OZ GRAPHITE PLAS KID SHP MOLD GRAD FOR ORAL

## (undated) DEVICE — STAPLER INT 60 UNIV PUR W/ TRI-STAPLE TECHNOLOGY ULT FOR

## (undated) DEVICE — KENDALL RADIOLUCENT FOAM MONITORING ELECTRODE -RECTANGULAR SHAPE: Brand: KENDALL

## (undated) DEVICE — KIT COMPLIANCE W ENDOGLDE + 11 NO BRSH ENDOKT

## (undated) DEVICE — SYRINGE MED 20ML STD CLR PLAS LUERLOCK TIP N CTRL DISP

## (undated) DEVICE — SUTURE SZ 0 27IN 5/8 CIR UR-6  TAPER PT VIOLET ABSRB VICRYL J603H

## (undated) DEVICE — SOLUTION IV 1000ML 0.9% SOD CHL

## (undated) DEVICE — POSITIONER HD REST FOAM CMFRT TCH

## (undated) DEVICE — SYRINGE 50ML E/T

## (undated) DEVICE — MARKER,SKIN,WI/RULER AND LABELS: Brand: MEDLINE

## (undated) DEVICE — PAD,ABDOMINAL,5"X9",ST,LF,25/BX: Brand: MEDLINE INDUSTRIES, INC.

## (undated) DEVICE — SOLUTION IV 50ML 0.9% SOD CHL

## (undated) DEVICE — SUTURE VCRL SZ 2-0 L27IN ABSRB UD L36MM CP-1 1/2 CIR REV J266H

## (undated) DEVICE — SIMPLICITY FLUFF UNDERPAD 23X36, MODERATE: Brand: SIMPLICITY

## (undated) DEVICE — ANTERIOR CERVICAL-SMH: Brand: MEDLINE INDUSTRIES, INC.

## (undated) DEVICE — YANKAUER,OPEN TIP,W/O VENT,STERILE: Brand: MEDLINE INDUSTRIES, INC.

## (undated) DEVICE — BLADE SAW W098XL354IN THK0047IN CUT THK0047IN SAG

## (undated) DEVICE — SUTURE STRATAFIX SPRL MCRYL + SZ 3 0 L27IN ABSRB UD SH L26MM SXMP1B428

## (undated) DEVICE — Z INACTIVE USE 2240337 DRAPE SURG PT TRANSFER TRAWAY SHT

## (undated) DEVICE — SOLUTION IRRIG 1000ML H2O STRL BLT

## (undated) DEVICE — STERILE POLYISOPRENE POWDER-FREE SURGICAL GLOVES: Brand: PROTEXIS

## (undated) DEVICE — SUTURE VCRL SZ 0 L36IN ABSRB VLT L40MM CT 1/2 CIR J358H

## (undated) DEVICE — 3M™ STERI-DRAPE™ 2 INCISE DRAPE 2050: Brand: STERI-DRAPE™

## (undated) DEVICE — SUTURE STRATAFIX SYMMETRIC SZ 1 L18IN ABSRB VLT CT1 L36CM SXPP1A404

## (undated) DEVICE — COVER,TABLE,HEAVY DUTY,60"X90",STRL: Brand: MEDLINE

## (undated) DEVICE — AGENT HEMSTAT 10ML MTRX W/ MALL TRIM TIP FLOSEAL

## (undated) DEVICE — TIP SUCT CRV REG REDI

## (undated) DEVICE — TOOL 14BA50 LEGEND 14CM 5MM BA: Brand: MIDAS REX ™

## (undated) DEVICE — SPECIMEN RETRIEVAL POUCH: Brand: ENDO CATCH GOLD

## (undated) DEVICE — KIT IV STRT W CHLORAPREP PD 1ML

## (undated) DEVICE — HANDLE LT SNAP ON ULT DURABLE LENS FOR TRUMPF ALC DISPOSABLE

## (undated) DEVICE — WRISTBAND ID AD W1XL11.5IN RED POLY ALRG PREPRINTED PERM

## (undated) DEVICE — BLADELESS OPTICAL TROCAR WITH FIXATION CANNULA: Brand: VERSAPORT

## (undated) DEVICE — PATIENT PROTECTIVE PAD FOR IMP UNIVERSAL LATERAL HIP POSITIONER (ULP) (6/CASE): Brand: PATIENT PROTECTIVE PAD

## (undated) DEVICE — C-ARM: Brand: UNBRANDED

## (undated) DEVICE — GOWN,PREVENTION PLUS,XLN/2XL,ST,22/CS: Brand: MEDLINE

## (undated) DEVICE — UNIVERSAL FIXATION CANNULA: Brand: VERSAONE

## (undated) DEVICE — INSULATED BLADE ELECTRODE: Brand: EDGE

## (undated) DEVICE — 4-PORT MANIFOLD: Brand: NEPTUNE 2

## (undated) DEVICE — SYR 20ML LL STRL LF --

## (undated) DEVICE — BITEBLOCK ENDOSCP 60FR MAXI WHT POLYETH STURDY W/ VELC WVN

## (undated) DEVICE — BONE WAX WHITE: Brand: BONE WAX WHITE

## (undated) DEVICE — SCRUB DRY SURG EZ SCRUB BRUSH PREOPERATIVE GRN

## (undated) DEVICE — SUTURE MCRYL SZ 3-0 L27IN ABSRB UD L24MM PS-1 3/8 CIR PRIM Y936H

## (undated) DEVICE — CANN NASAL O2 CAPNOGRAPHY AD -- FILTERLINE

## (undated) DEVICE — TUBING INSUFLTN 10FT LUER -- CONVERT TO ITEM 368568

## (undated) DEVICE — HANDPIECE SET WITH BONE CLEANING TIP AND SUCTION TUBE: Brand: INTERPULSE

## (undated) DEVICE — PAD BD MATTRESS 73X32 IN STD CONVOLUTED FOAM LTX FREE

## (undated) DEVICE — KIT COLON W/ 1.1OZ LUB AND 2 END

## (undated) DEVICE — BIPOLAR FORCEPS CORD: Brand: VALLEYLAB

## (undated) DEVICE — BAG SPEC BIOHZRD 10 X 10 IN --

## (undated) DEVICE — PREP SKN PREVAIL 40ML APPL --

## (undated) DEVICE — SUTURE STRATAFIX SPRL SZ 1 L14IN ABSRB VLT L48CM CTX 1/2 SXPD2B405

## (undated) DEVICE — COTTON TIPPED APPLICATORS: Brand: DEROYAL

## (undated) DEVICE — TOOL 14MH30 LEGEND 14CM 3MM: Brand: MIDAS REX ™

## (undated) DEVICE — (D)SENSOR RMFG 02 PULS OXMTR -- DISC BY MFR USE ITEM 133445

## (undated) DEVICE — SHEAR HARMONIC 5MMX45CM -- ACE 7+

## (undated) DEVICE — 39" SINGLE PATIENT USE HOVERMATT BREATHABLE: Brand: SINGLE PATIENT USE HOVERMATT

## (undated) DEVICE — DRSG AQUACEL SURG 3.5 X 10IN -- CONVERT TO ITEM 370183

## (undated) DEVICE — SUTURING DEVICE: Brand: ENDO STITCH

## (undated) DEVICE — TROCAR SITE CLOSURE DEVICE: Brand: ENDO CLOSE

## (undated) DEVICE — STERILE PVP: Brand: MEDLINE INDUSTRIES, INC.

## (undated) DEVICE — HALTER TRACTION HD W/ TRI COTTON LINING FOAM LTX

## (undated) DEVICE — WRENCH TORQUE

## (undated) DEVICE — GAUZE SPONGES,12 PLY: Brand: CURITY

## (undated) DEVICE — WOUND RETRACTOR AND PROTECTOR: Brand: ALEXIS WOUND PROTECTOR-RETRACTOR

## (undated) DEVICE — POWER SHELL: Brand: SIGNIA

## (undated) DEVICE — TAPE,CLOTH/SILK,CURAD,3"X10YD,LF,40/CS: Brand: CURAD

## (undated) DEVICE — NEEDLE HYPO 21GA L1.5IN INTRAMUSCULAR S STL LATCH BVL UP

## (undated) DEVICE — STRAP POS KNEE BODY VELC

## (undated) DEVICE — SOLUTION IRRIG 3000ML 0.9% SOD CHL FLX CONT 0797208] ICU MEDICAL INC]

## (undated) DEVICE — DEVON™ KNEE AND BODY STRAP 60" X 3" (1.5 M X 7.6 CM): Brand: DEVON

## (undated) DEVICE — Z DISCONTINUED NO SUB IDED SET EXTN W/ 4 W STPCOCK M SPIN LOK 36IN

## (undated) DEVICE — DRAIN SURG FLAT W7MMXL20CM FULL PERF

## (undated) DEVICE — SUT ETHLN 2-0 18IN FS BLK --

## (undated) DEVICE — COTTON BALLS: Brand: DEROYAL

## (undated) DEVICE — PAD,NON-ADHERENT,3X8,STERILE,LF,1/PK: Brand: MEDLINE

## (undated) DEVICE — ALCOHOL RUBBING ISO 16OZ 70%

## (undated) DEVICE — SUTURE MCRYL SZ 4-0 L27IN ABSRB UD L19MM PS-2 1/2 CIR PRIM Y426H

## (undated) DEVICE — APPLICATOR PREP 26ML 0.7% IOD POVACRYLEX 74% ISO ALC ST

## (undated) DEVICE — CANNULA CUSH AD W/ 14FT TBG

## (undated) DEVICE — DEVICE SUT VLT PRELD W/ POLYSRB 2-0 L48IN SUT DISP FOR LAP

## (undated) DEVICE — CUFF RMFG BP INF SZ 11 DISP -- LAWSON OEM ITEM 238915

## (undated) DEVICE — GAUZE,SPONGE,4"X4",16PLY,STRL,LF,10/TRAY: Brand: MEDLINE

## (undated) DEVICE — TEMPLATE SURG STR PEDIATRIC CLMP FLIMB RECON SYS

## (undated) DEVICE — POLYP TRAP: Brand: TRAPEASE®

## (undated) DEVICE — SOLIDIFIER FLD 2OZ 1500CC N DISINF IN BTL DISP SAFESORB

## (undated) DEVICE — ELECTRO LUBE IS A SINGLE PATIENT USE DEVICE THAT IS INTENDED TO BE USED ON ELECTROSURGICAL ELECTRODES TO REDUCE STICKING.: Brand: KEY SURGICAL ELECTRO LUBE

## (undated) DEVICE — NEEDLE HYPO 25GA L1.5IN BLU POLYPR HUB S STL REG BVL STR

## (undated) DEVICE — SUTURE ABS ANTIBACT 1-0 CTX 24IN STRATAFIX PDS+ SXPP1A445

## (undated) DEVICE — CLICKLINE SCISSORS INSERT: Brand: CLICKLINE

## (undated) DEVICE — 3M™ IOBAN™ 2 ANTIMICROBIAL INCISE DRAPE 6651EZ: Brand: IOBAN™ 2

## (undated) DEVICE — GOWN,SIRUS,FABRNF,XL,20/CS: Brand: MEDLINE

## (undated) DEVICE — SUTURE ABS MF 2-0 CT1 27IN STRATAFIX PDS+ SXPP1B412

## (undated) DEVICE — ELECTRODE BLDE L4IN NONINSULATED EDGE

## (undated) DEVICE — SOLIDIFIER MEDC 1200ML -- CONVERT TO 356117